# Patient Record
Sex: FEMALE
[De-identification: names, ages, dates, MRNs, and addresses within clinical notes are randomized per-mention and may not be internally consistent; named-entity substitution may affect disease eponyms.]

---

## 2017-02-22 ENCOUNTER — HOSPITAL (OUTPATIENT)
Dept: OTHER | Age: 82
End: 2017-02-22
Attending: INTERNAL MEDICINE

## 2017-02-22 LAB
ANION GAP SERPL CALC-SCNC: 16 MMOL/L (ref 10–20)
BUN SERPL-MCNC: 37 MG/DL (ref 6–20)
BUN/CREAT SERPL: 29 (ref 7–25)
CALCIUM SERPL-MCNC: 9 MG/DL (ref 8.4–10.2)
CHLORIDE: 104 MMOL/L (ref 98–107)
CO2 SERPL-SCNC: 26 MMOL/L (ref 21–32)
CREAT SERPL-MCNC: 1.27 MG/DL (ref 0.51–0.95)
GLUCOSE SERPL-MCNC: 86 MG/DL (ref 65–99)
POTASSIUM SERPL-SCNC: 4.6 MMOL/L (ref 3.4–5.1)
SODIUM SERPL-SCNC: 141 MMOL/L (ref 135–145)
VIT B12 SERPL-MCNC: 758 PG/ML (ref 211–911)

## 2017-03-16 ENCOUNTER — HOSPITAL (OUTPATIENT)
Dept: OTHER | Age: 82
End: 2017-03-16
Attending: INTERNAL MEDICINE

## 2017-03-16 LAB
25(OH)D3+25(OH)D2 SERPL-MCNC: 51.6 NG/ML (ref 30–100)
ANALYZER ANC (IANC): ABNORMAL
ANION GAP SERPL CALC-SCNC: 16 MMOL/L (ref 10–20)
BASOPHILS # BLD: 0 THOUSAND/MCL (ref 0–0.3)
BASOPHILS NFR BLD: 1 %
BUN SERPL-MCNC: 27 MG/DL (ref 6–20)
BUN/CREAT SERPL: 22 (ref 7–25)
CALCIUM SERPL-MCNC: 8.7 MG/DL (ref 8.4–10.2)
CHLORIDE: 107 MMOL/L (ref 98–107)
CO2 SERPL-SCNC: 26 MMOL/L (ref 21–32)
CREAT SERPL-MCNC: 1.25 MG/DL (ref 0.51–0.95)
DIFFERENTIAL METHOD BLD: ABNORMAL
EOSINOPHIL # BLD: 0.1 THOUSAND/MCL (ref 0.1–0.5)
EOSINOPHIL NFR BLD: 3 %
ERYTHROCYTE [DISTWIDTH] IN BLOOD: 13.3 % (ref 11–15)
FREE T3: 1.8 PG/ML (ref 2.2–4)
FREE T4: 1.2 NG/DL (ref 0.8–1.5)
GLUCOSE SERPL-MCNC: 93 MG/DL (ref 65–99)
HEMATOCRIT: 35.4 % (ref 36–46.5)
HGB BLD-MCNC: 11.9 GM/DL (ref 12–15.5)
LYMPHOCYTES # BLD: 1.5 THOUSAND/MCL (ref 1–4)
LYMPHOCYTES NFR BLD: 37 %
MCH RBC QN AUTO: 31.2 PG (ref 26–34)
MCHC RBC AUTO-ENTMCNC: 33.6 GM/DL (ref 32–36.5)
MCV RBC AUTO: 92.7 FL (ref 78–100)
MONOCYTES # BLD: 0.3 THOUSAND/MCL (ref 0.3–0.9)
MONOCYTES NFR BLD: 9 %
NEUTROPHILS # BLD: 2 THOUSAND/MCL (ref 1.8–7.7)
NEUTROPHILS NFR BLD: 50 %
NEUTS SEG NFR BLD: ABNORMAL %
PERCENT NRBC: ABNORMAL
PLATELET # BLD: 221 THOUSAND/MCL (ref 140–450)
POTASSIUM SERPL-SCNC: 4.6 MMOL/L (ref 3.4–5.1)
RBC # BLD: 3.82 MILLION/MCL (ref 4–5.2)
SODIUM SERPL-SCNC: 144 MMOL/L (ref 135–145)
TSH SERPL-ACNC: 1.79 MCUNIT/ML (ref 0.35–5)
WBC # BLD: 4 THOUSAND/MCL (ref 4.2–11)

## 2017-06-16 ENCOUNTER — HOSPITAL (OUTPATIENT)
Dept: OTHER | Age: 82
End: 2017-06-16
Attending: INTERNAL MEDICINE

## 2017-06-17 ENCOUNTER — DIAGNOSTIC TRANS (OUTPATIENT)
Dept: OTHER | Age: 82
End: 2017-06-17

## 2018-09-10 PROBLEM — I10 ESSENTIAL HYPERTENSION, BENIGN: Status: ACTIVE | Noted: 2018-09-10

## 2018-09-10 PROBLEM — E78.5 HYPERLIPIDEMIA, UNSPECIFIED HYPERLIPIDEMIA TYPE: Status: ACTIVE | Noted: 2018-09-10

## 2018-09-10 PROBLEM — H25.89 OTHER AGE-RELATED CATARACT: Status: ACTIVE | Noted: 2018-09-10

## 2018-09-10 PROBLEM — H90.2: Status: ACTIVE | Noted: 2018-09-10

## 2018-09-10 PROBLEM — E03.9 HYPOTHYROIDISM, UNSPECIFIED TYPE: Status: ACTIVE | Noted: 2018-09-10

## 2019-06-20 PROBLEM — H90.2: Status: RESOLVED | Noted: 2018-09-10 | Resolved: 2019-06-20

## 2019-06-20 PROBLEM — H25.89 OTHER AGE-RELATED CATARACT: Status: RESOLVED | Noted: 2018-09-10 | Resolved: 2019-06-20

## 2020-11-05 PROBLEM — N18.32 STAGE 3B CHRONIC KIDNEY DISEASE (HCC): Status: ACTIVE | Noted: 2020-11-05

## 2020-11-05 PROBLEM — E78.5 HYPERLIPIDEMIA, UNSPECIFIED HYPERLIPIDEMIA TYPE: Status: RESOLVED | Noted: 2018-09-10 | Resolved: 2020-11-05

## 2020-11-05 PROBLEM — E78.2 MIXED HYPERLIPIDEMIA: Status: ACTIVE | Noted: 2018-09-10

## 2021-05-20 ENCOUNTER — APPOINTMENT (OUTPATIENT)
Dept: GENERAL RADIOLOGY | Facility: HOSPITAL | Age: 86
End: 2021-05-20
Attending: EMERGENCY MEDICINE
Payer: MEDICARE

## 2021-05-20 ENCOUNTER — HOSPITAL ENCOUNTER (OUTPATIENT)
Facility: HOSPITAL | Age: 86
Setting detail: OBSERVATION
Discharge: HOME | End: 2021-05-21
Attending: EMERGENCY MEDICINE | Admitting: HOSPITALIST
Payer: MEDICARE

## 2021-05-20 DIAGNOSIS — R53.81 PHYSICAL DECONDITIONING: ICD-10-CM

## 2021-05-20 DIAGNOSIS — I48.91 ATRIAL FIBRILLATION WITH RAPID VENTRICULAR RESPONSE (HCC): Primary | ICD-10-CM

## 2021-05-20 DIAGNOSIS — R04.0 EPISTAXIS: ICD-10-CM

## 2021-05-20 PROCEDURE — 93010 ELECTROCARDIOGRAM REPORT: CPT | Performed by: EMERGENCY MEDICINE

## 2021-05-20 PROCEDURE — 84484 ASSAY OF TROPONIN QUANT: CPT | Performed by: EMERGENCY MEDICINE

## 2021-05-20 PROCEDURE — 85025 COMPLETE CBC W/AUTO DIFF WBC: CPT | Performed by: EMERGENCY MEDICINE

## 2021-05-20 PROCEDURE — 80048 BASIC METABOLIC PNL TOTAL CA: CPT | Performed by: EMERGENCY MEDICINE

## 2021-05-20 PROCEDURE — 93005 ELECTROCARDIOGRAM TRACING: CPT

## 2021-05-20 PROCEDURE — 71045 X-RAY EXAM CHEST 1 VIEW: CPT | Performed by: EMERGENCY MEDICINE

## 2021-05-20 PROCEDURE — 96365 THER/PROPH/DIAG IV INF INIT: CPT

## 2021-05-20 PROCEDURE — 96372 THER/PROPH/DIAG INJ SC/IM: CPT

## 2021-05-20 PROCEDURE — 99285 EMERGENCY DEPT VISIT HI MDM: CPT

## 2021-05-20 PROCEDURE — 96366 THER/PROPH/DIAG IV INF ADDON: CPT

## 2021-05-20 RX ORDER — POLYETHYLENE GLYCOL 3350 17 G/17G
17 POWDER, FOR SOLUTION ORAL DAILY PRN
Status: DISCONTINUED | OUTPATIENT
Start: 2021-05-20 | End: 2021-05-21

## 2021-05-20 RX ORDER — DILTIAZEM HYDROCHLORIDE 5 MG/ML
10 INJECTION INTRAVENOUS ONCE
Status: COMPLETED | OUTPATIENT
Start: 2021-05-20 | End: 2021-05-20

## 2021-05-20 RX ORDER — METOCLOPRAMIDE HYDROCHLORIDE 5 MG/ML
5 INJECTION INTRAMUSCULAR; INTRAVENOUS EVERY 8 HOURS PRN
Status: DISCONTINUED | OUTPATIENT
Start: 2021-05-20 | End: 2021-05-21

## 2021-05-20 RX ORDER — ATORVASTATIN CALCIUM 10 MG/1
10 TABLET, FILM COATED ORAL DAILY
Status: DISCONTINUED | OUTPATIENT
Start: 2021-05-21 | End: 2021-05-21

## 2021-05-20 RX ORDER — LEVOTHYROXINE SODIUM 0.07 MG/1
75 TABLET ORAL
Status: DISCONTINUED | OUTPATIENT
Start: 2021-05-21 | End: 2021-05-21

## 2021-05-20 RX ORDER — HEPARIN SODIUM 5000 [USP'U]/ML
5000 INJECTION, SOLUTION INTRAVENOUS; SUBCUTANEOUS EVERY 8 HOURS SCHEDULED
Status: DISCONTINUED | OUTPATIENT
Start: 2021-05-20 | End: 2021-05-21

## 2021-05-20 RX ORDER — ONDANSETRON 2 MG/ML
4 INJECTION INTRAMUSCULAR; INTRAVENOUS EVERY 6 HOURS PRN
Status: DISCONTINUED | OUTPATIENT
Start: 2021-05-20 | End: 2021-05-21

## 2021-05-20 RX ORDER — ACETAMINOPHEN 325 MG/1
650 TABLET ORAL EVERY 6 HOURS PRN
Status: DISCONTINUED | OUTPATIENT
Start: 2021-05-20 | End: 2021-05-21

## 2021-05-20 RX ORDER — SODIUM BICARBONATE 650 MG/1
325 TABLET ORAL 3 TIMES DAILY
Status: DISCONTINUED | OUTPATIENT
Start: 2021-05-20 | End: 2021-05-21

## 2021-05-20 RX ORDER — BISACODYL 10 MG
10 SUPPOSITORY, RECTAL RECTAL
Status: DISCONTINUED | OUTPATIENT
Start: 2021-05-20 | End: 2021-05-21

## 2021-05-20 NOTE — H&P
[facility] Hospitalist H&P     CC: Patient presents with:  Patricia Drummond       PCP: Kali Maradiaga MD      Assessment and Plan     Hayley Dacosta is a 80year old female with PMH sig for CKD stage 3 with baseline around 1.5 and atrophic right kidney, HTN, hy Laterality Date   • PARTIAL EXCISION THYROID,UNILAT          ALL:    Red Dye                 SWELLING     Home Medications:  No outpatient medications have been marked as taking for the 5/20/21 encounter [facility] Encounter).       Soc Hx  Social History

## 2021-05-20 NOTE — ED QUICK NOTES
Orders for admission, patient is aware of plan and ready to go upstairs. Any questions, please call ED LUZMARIA Venegas  at extension [phone].    Type of COVID test sent:Rapid  COVID Suspicion level: Low    Titratable drug(s) infusing: Cardizem  Rate: 5mg/hr    LOC a

## 2021-05-20 NOTE — ED INITIAL ASSESSMENT (HPI)
Pt BIBEMS from [facility] with c/o nosebleed x1.5 hours. Pt denies blood thinner use, LOC/dizziness.

## 2021-05-20 NOTE — ED QUICK NOTES
Pt states she was washing her face and cleaning her nose, when the left nare suddenly began to bleed. States started approx 1430.  had a nasal clamp at home, that she placed on her nose.  does have history of hypertension.  Denies history of AF

## 2021-05-21 ENCOUNTER — APPOINTMENT (OUTPATIENT)
Dept: CV DIAGNOSTICS | Facility: HOSPITAL | Age: 86
End: 2021-05-21
Attending: INTERNAL MEDICINE
Payer: MEDICARE

## 2021-05-21 VITALS
BODY MASS INDEX: 23.54 KG/M2 | WEIGHT: 137.88 LBS | DIASTOLIC BLOOD PRESSURE: 91 MMHG | SYSTOLIC BLOOD PRESSURE: 158 MMHG | HEIGHT: 64 IN | OXYGEN SATURATION: 98 % | HEART RATE: 84 BPM | RESPIRATION RATE: 18 BRPM | TEMPERATURE: 98 F

## 2021-05-21 PROCEDURE — 84443 ASSAY THYROID STIM HORMONE: CPT | Performed by: HOSPITALIST

## 2021-05-21 PROCEDURE — 80061 LIPID PANEL: CPT | Performed by: HOSPITALIST

## 2021-05-21 PROCEDURE — 85025 COMPLETE CBC W/AUTO DIFF WBC: CPT | Performed by: HOSPITALIST

## 2021-05-21 PROCEDURE — 93306 TTE W/DOPPLER COMPLETE: CPT | Performed by: INTERNAL MEDICINE

## 2021-05-21 PROCEDURE — 96372 THER/PROPH/DIAG INJ SC/IM: CPT

## 2021-05-21 PROCEDURE — 97165 OT EVAL LOW COMPLEX 30 MIN: CPT

## 2021-05-21 PROCEDURE — B246ZZZ ULTRASONOGRAPHY OF RIGHT AND LEFT HEART: ICD-10-PCS | Performed by: INTERNAL MEDICINE

## 2021-05-21 PROCEDURE — 80053 COMPREHEN METABOLIC PANEL: CPT | Performed by: HOSPITALIST

## 2021-05-21 PROCEDURE — 97162 PT EVAL MOD COMPLEX 30 MIN: CPT

## 2021-05-21 PROCEDURE — 97530 THERAPEUTIC ACTIVITIES: CPT

## 2021-05-21 RX ORDER — LISINOPRIL 10 MG/1
10 TABLET ORAL DAILY
Status: DISCONTINUED | OUTPATIENT
Start: 2021-05-22 | End: 2021-05-21

## 2021-05-21 RX ORDER — LISINOPRIL 10 MG/1
10 TABLET ORAL DAILY
Qty: 30 TABLET | Refills: 0 | Status: SHIPPED | OUTPATIENT
Start: 2021-05-22 | End: 2021-06-03

## 2021-05-21 NOTE — PHYSICAL THERAPY NOTE
PHYSICAL THERAPY EVALUATION - INPATIENT     Room Number: 302/302-A  Evaluation Date: 5/21/2021  Type of Evaluation: Initial   Physician Order: PT Eval and Treat    Presenting Problem: AF with epistaxis--cardiology following this admission .    [facility] sign for mild instability related to being in bed this admission.        Patient's current functional deficits include chronic back and right LE pain ,  decrease balance , increased fall risk , need  Initially for  CGA with ambulation progressing to SBA level by end based on documentation in the [facility] '6 clicks' Inpatient Basic Mobility Short Form. Research supports that patients with this level of impairment may benefit from home with no services.    Therapy discussed recommendation for initial recommendation for 24h meds  -check TSH     FEN  -IVF prn, lytes, cardiac diet     PPX; SCD, HSQ     Dispo pending course, full code     Outpatient records reviewed confirming patient's medical history and medications.      Further recommendations pending patient's clinical cour limits  · Safety Judgement:  decreased awareness of need for safety  · Awareness of Deficits:  decreased awareness of deficits    RANGE OF MOTION AND STRENGTH ASSESSMENT    ROM and strength not formally assessed , fxn mobility assessment of ROM and strengt Score: 20   Approx Degree of Impairment: 35.83%   Standardized Score (AM-PAC Scale): 47.67   CMS Modifier (G-Code): CJ    FUNCTIONAL ABILITY STATUS  Gait Assessment   Gait Assistance:  (initially CGA progressed to SBA /supervision )  Distance (ft): 240 ft

## 2021-05-21 NOTE — CONSULTS
[facility]    Report of Consultation    Hazel aEston Patient Status:  Observation    1931 MRN [id number]   Location [facility] 3W/SW Attending Bang Harris MD   Hosp Day # 0 PCP Diane Chavez MD     Date of Adm suppository 10 mg, 10 mg, Rectal, Daily PRN  atorvastatin (LIPITOR) tab 10 mg, 10 mg, Oral, Daily  Levothyroxine Sodium tab 75 mcg, 75 mcg, Oral, Before breakfast  sodium bicarbonate tab 325 mg, 325 mg, Oral, TID  dilTIAZem BOLUS FROM BAG 10 mg infusion, 1 ALT 15 05/21/2021    T4F 1.52 09/10/2018    TSH 1.650 05/21/2021    PHOS 4.10 03/03/2021    TROP <0.045 05/20/2021    B12 893 11/01/2019         Imaging:  XR CHEST AP PORTABLE  (CPT=71045)    Result Date: 5/20/2021  CONCLUSION:  Negative for radiographi

## 2021-05-21 NOTE — PLAN OF CARE
Crystal Jarquin is alert and orientated. Admission complete. Orientated to unit. Cardizem drip stopped per protocol. Dr. Neto Atkins notified -- per Dr. Neto Atkins, do not administer oral Cardizem at this time. Call light within reach.     Problem: Patient Centered Care  Goal: CARDIOVASCULAR - ADULT  Goal: Maintains optimal cardiac output and hemodynamic stability  Description: INTERVENTIONS:  - Monitor vital signs, rhythm, and trends  - Monitor for bleeding, hypotension and signs of decreased cardiac output  - Evaluate effectiv

## 2021-05-21 NOTE — DIETARY NOTE
Brief Nutrition Note:     RN asked RD to see patient for diet education prior to discharge. Pt with questions regarding high K+ rich foods to avoid and general low sodium diet questions. RD answered questions and provided handout.   This very pleasant pat

## 2021-05-21 NOTE — DISCHARGE SUMMARY
[facility] Internal Medicine Discharge Summary   Patient ID:  Chad Sharma  [id number]  36 year old  11/29/1931    Admit date: 5/20/2021    Discharge date and time: 5/21/21    Attending Physician: Micheline Cunningham MD     Primary Care Physician: Julia Ovalle found to be in afib with RVR.     Nose bleed cauterized in ED and now resolved. HR improved on dilt gtt     She denies palpitations or CP. No N/V/D. No fevers. Couhg. She states she is still distressed by her sisters death 1 year ago.     Hospital Cours ventricle: The cavity size was normal. Wall thickness was    increased in a pattern of mild LVH. Systolic function was    normal. The estimated ejection fraction was 60-65%, by biplane    method of disks.  Wall motion was normal; there were no regional    w size. Mitral valve:   Normal thickened leaflets. Mobility was not restricted. Doppler: There was no evidence for stenosis. Mild regurgitation. Left atrium:  The atrium was moderately dilated. Right ventricle: The cavity size was dilated.  Systolic fu LV E/e', lateral                            10.9         ---------  LV e', medial                               4.79  cm/sec ---------  LV E/e', medial                             9.4          ---------  LV e', average                              4.5   cm Value        Reference  Estimated CVP                               3     mm Hg  ---------   Right ventricle                             Value        Reference  RV ID, ED, PLAX                             3.9   cm     ---------  RV pressure, S, DP questions and state understand and agree with therapeutic plan as outlined

## 2021-05-21 NOTE — DISCHARGE PLANNING
I called the pharmacy below to inquire about the cost of Eliquis before patient discharges.      Jacki Pulido 10    The patient's copay is $481.40 for 30 day supply; pharmacist estimates that the patient's copay nakita

## 2021-05-21 NOTE — CONSULTS
Emily Pérez is a 80year old female who I assessed as follows:  Patient presents with:  Nose Bleed         REASON FOR CONSULTATION:    Atrial fib            ASSESSMENT/PLAN:     IMPRESSIONS:  1. PAF, new diagnosis, as Intravenous, Q6H PRN  Metoclopramide HCl (REGLAN) injection 5 mg, 5 mg, Intravenous, Q8H PRN  PEG 3350 (MIRALAX) powder packet 17 g, 17 g, Oral, Daily PRN  bisacodyl (DULCOLAX) rectal suppository 10 mg, 10 mg, Rectal, Daily PRN  atorvastatin (LIPITOR) tab edema  NEUROLOGY: alert  PSYCH: cooperative          LABORATORY DATA:               ECG: AF        ECHO:          Labs:  Recent Labs   Lab 05/20/21  1622 05/21/21  0526   * 95   BUN 40* 42*   CREATSERUM 1.60* 1.56*   GFRAA 33* 34*   GFRNAA 28* 29*

## 2021-05-21 NOTE — CM/SW NOTE
KRISTOPHER met with the pt. At bedside. The pt. Lives alone in an independent apartment at [facility]. The pt. Reports being independent prior to admission with adls, ambulation and driving. The pt. Does not have any children or family in the area. The pt.  Is

## 2021-05-21 NOTE — PLAN OF CARE
Patient converted from atrial fibrillation to sinus rhythm at 1927; heart rate range is currently 70's-80's.

## 2021-05-21 NOTE — ED PROVIDER NOTES
Patient Seen in: [facility] CL[facility] 3w/sw      History   Patient presents with:  Nose Bleed    Stated Complaint: nosebleed    HPI/Subjective:   HPI    The patient is an 80-year-old female with history of hypertension who presents to the emergency departme Pharynx: Oropharynx is clear. Eyes:      Conjunctiva/sclera: Conjunctivae normal.      Pupils: Pupils are equal, round, and reactive to light. Neck:      Vascular: No JVD. Cardiovascular:      Rate and Rhythm: Tachycardia present. Rhythm irregular. WITH DIFFERENTIAL WITH PLATELET.   Procedure                               Abnormality         Status                     ---------                               -----------         ------                     CBC W/ DIFFERENTIAL[[id number]]          Abnormal response ([facility].)  (primary encounter diagnosis)  Epistaxis     Disposition:  Admit  5/20/2021  5:35 pm    Follow-up:  No follow-up provider specified.   We recommend that you schedule follow up care with a primary care provider within the next three months to

## 2021-05-21 NOTE — OCCUPATIONAL THERAPY NOTE
OCCUPATIONAL THERAPY EVALUATION - INPATIENT     Room Number: 302/302-A  Evaluation Date: 5/21/2021  Type of Evaluation: Quick Eval  Presenting Problem: Afib with RVR, epistaxis    Physician Order: IP Consult to Occupational Therapy  Reason for Therapy: A Impairment: 32.79% has been calculated based on documentation in the [facility] '6 clicks' Inpatient Daily Activity Short Form. Research supports that patients with this level of impairment may benefit from returning home without services.  Pt would benefit fro OBJECTIVE  Precautions: Cardiac;Hard of hearing  Fall Risk: Standard fall risk    PAIN ASSESSMENT  Ratin    ACTIVITY TOLERANCE  BP: 156/86  BP Location: Right arm  BP Method: Automatic  Patient Position: Sitting    O2 SATURATIONS  Oxygen Therapy  S achieve the following . ..    Patient able to toilet transfer  CGA (adequate for dc)    Patient able to dress lower extremities  CGA (adequate for dc)    Patient/Caregiver able to demonstrate safety with ADLS  CGA (adequate for dc)   DERIAN Lawson

## 2021-06-07 PROBLEM — Z79.01 LONG TERM CURRENT USE OF ANTICOAGULANT THERAPY: Status: ACTIVE | Noted: 2021-06-07

## 2021-06-22 ENCOUNTER — OFFICE VISIT (OUTPATIENT)
Dept: OTOLARYNGOLOGY | Facility: CLINIC | Age: 86
End: 2021-06-22
Payer: MEDICARE

## 2021-06-22 VITALS
WEIGHT: 143 LBS | HEIGHT: 60 IN | DIASTOLIC BLOOD PRESSURE: 91 MMHG | SYSTOLIC BLOOD PRESSURE: 149 MMHG | HEART RATE: 85 BPM | BODY MASS INDEX: 28.07 KG/M2

## 2021-06-22 DIAGNOSIS — R04.0 EPISTAXIS: Primary | ICD-10-CM

## 2021-06-22 PROCEDURE — 99203 OFFICE O/P NEW LOW 30 MIN: CPT | Performed by: OTOLARYNGOLOGY

## 2021-06-22 PROCEDURE — 30901 CONTROL OF NOSEBLEED: CPT | Performed by: OTOLARYNGOLOGY

## 2021-06-22 NOTE — PROGRESS NOTES
Hayley Dacosta is a 80year old female.   Patient presents with:  Nose Bleed: recurrent nose bleeds, pt seen at [address] [place] [address] ER 5/20 , pt addmitted for irregular HR,, pt had nose cauterized by anaother ENT on 06/04 right side , pt currently on Eliquis       HISTOR     Days of Exercise per Week:       Minutes of Exercise per Session:   Stress:       Feeling of Stress :   Social Connections:       Frequency of Communication with Friends and Family:       Frequency of Social Gatherings with Friends and Family:       Normal.  . EOMI   Neurological Normal Memory - Normal. Cranial nerves - Cranial nerves II through XII grossly intact.  Gait is normal   Head/Face Normal Facial features - Normal. Eyebrows - Normal. Skull - Normal.             Ears Normal Inspection - Right: - CTRL NOSEBLEED,ANTER,SIMPLE    I discussed the pathophysiology of epistaxis at length with the patient. Potential causes include :high blood pressure, inflammatory medications, blood thinners, nose picking, and dry conditions.  I emphasized the need for

## 2021-07-01 ENCOUNTER — TELEPHONE (OUTPATIENT)
Dept: AUDIOLOGY | Facility: CLINIC | Age: 86
End: 2021-07-01

## 2021-07-01 NOTE — TELEPHONE ENCOUNTER
Per pt requesting to speak to audiologist prior to making HT, states she has hearing aid questions. Please call thank you.

## 2021-07-01 NOTE — TELEPHONE ENCOUNTER
Spoke with patient and answered all of her questions regarding hearing aids. She will call back and make an appt if she wishes to proceed.     Alejandro Palencia AUD

## 2021-07-12 ENCOUNTER — OFFICE VISIT (OUTPATIENT)
Dept: OTOLARYNGOLOGY | Facility: CLINIC | Age: 86
End: 2021-07-12
Payer: MEDICARE

## 2021-07-12 VITALS
HEIGHT: 60 IN | BODY MASS INDEX: 27.48 KG/M2 | TEMPERATURE: 98 F | SYSTOLIC BLOOD PRESSURE: 152 MMHG | WEIGHT: 140 LBS | DIASTOLIC BLOOD PRESSURE: 88 MMHG

## 2021-07-12 DIAGNOSIS — R04.0 EPISTAXIS: Primary | ICD-10-CM

## 2021-07-12 PROCEDURE — 30901 CONTROL OF NOSEBLEED: CPT | Performed by: OTOLARYNGOLOGY

## 2021-07-12 PROCEDURE — 99213 OFFICE O/P EST LOW 20 MIN: CPT | Performed by: OTOLARYNGOLOGY

## 2021-07-12 NOTE — PROGRESS NOTES
Kylee Whiting is a 80year old female.   Patient presents with:  Nose Bleed: Patient has left nostril nosebleed last episode was Saturday taking blood thinner      HISTORY OF PRESENT ILLNESS  6/22/2021  Patient prevents for evaluation of nosebleeds  Rece Out of Food in the Last Year:   Transportation Needs:       Lack of Transportation (Medical):       Lack of Transportation (Non-Medical):   Physical Activity:       Days of Exercise per Week:       Minutes of Exercise per Session:   Stress:       Feeling o - Normal. Palpation - Normal without lymphadenopathy. Parotid gland - Normal. Thyroid gland - Normal.   Eyes Normal Conjunctiva - Right: Normal, Left: Normal. Pupil - Right: Normal, Left: Normal.  .  EOMI   Neurological Normal Memory - Normal. Cranial nerve taking: Reported on 6/22/2021 ), Disp: , Rfl:   •  Neomycin-Polymyxin-HC 3.5-45799-0 Otic Suspension, 2 drops either nostril BID PRN (Patient not taking: Reported on 7/12/2021 ), Disp: 1 each, Rfl: 2  ASSESSMENT AND PLAN    1. Epistaxis     - CTRL Twila Gallegos

## 2021-07-13 ENCOUNTER — TELEPHONE (OUTPATIENT)
Dept: OTOLARYNGOLOGY | Facility: CLINIC | Age: 86
End: 2021-07-13

## 2021-07-14 ENCOUNTER — OFFICE VISIT (OUTPATIENT)
Dept: OTOLARYNGOLOGY | Facility: CLINIC | Age: 86
End: 2021-07-14
Payer: MEDICARE

## 2021-07-14 VITALS
DIASTOLIC BLOOD PRESSURE: 87 MMHG | BODY MASS INDEX: 27.48 KG/M2 | HEIGHT: 60 IN | WEIGHT: 140 LBS | TEMPERATURE: 97 F | SYSTOLIC BLOOD PRESSURE: 164 MMHG

## 2021-07-14 DIAGNOSIS — R04.0 EPISTAXIS: Primary | ICD-10-CM

## 2021-07-14 PROCEDURE — 30901 CONTROL OF NOSEBLEED: CPT | Performed by: OTOLARYNGOLOGY

## 2021-07-14 PROCEDURE — 99213 OFFICE O/P EST LOW 20 MIN: CPT | Performed by: OTOLARYNGOLOGY

## 2021-07-14 NOTE — PROGRESS NOTES
Regan Mueller is a 80year old female. No chief complaint on file.       HISTORY OF PRESENT ILLNESS  6/22/2021  Patient prevents for evaluation of nosebleeds  Recent bleeding last  Started  2 days ago Patient is currenly treated with pinching her nostri Year:       Ran Out of Food in the Last Year:   Transportation Needs:       Lack of Transportation (Medical):       Lack of Transportation (Non-Medical):   Physical Activity:       Days of Exercise per Week:       Minutes of Exercise per Session:   Stress: without lymphadenopathy. Parotid gland - Normal. Thyroid gland - Normal.   Eyes Normal Conjunctiva - Right: Normal, Left: Normal. Pupil - Right: Normal, Left: Normal.  .  EOMI   Neurological Normal Memory - Normal. Cranial nerves - Cranial nerves II through mouth nightly.  ), Disp: 90 tablet, Rfl: 3  •  Cholecalciferol (VITAMIN D) 50 MCG (2000 UT) Oral Cap, Take by mouth., Disp: , Rfl:   ASSESSMENT AND PLAN    1. Epistaxis     - CTRL NOSEBLEED,ANTER,SIMPLE    I discussed the pathophysiology of epistaxis at le

## 2021-07-19 ENCOUNTER — OFFICE VISIT (OUTPATIENT)
Dept: OTOLARYNGOLOGY | Facility: CLINIC | Age: 86
End: 2021-07-19
Payer: MEDICARE

## 2021-07-19 VITALS
HEIGHT: 60 IN | SYSTOLIC BLOOD PRESSURE: 147 MMHG | TEMPERATURE: 97 F | WEIGHT: 140 LBS | BODY MASS INDEX: 27.48 KG/M2 | DIASTOLIC BLOOD PRESSURE: 80 MMHG

## 2021-07-19 DIAGNOSIS — R04.0 EPISTAXIS: Primary | ICD-10-CM

## 2021-07-19 PROCEDURE — 99213 OFFICE O/P EST LOW 20 MIN: CPT | Performed by: OTOLARYNGOLOGY

## 2021-07-19 NOTE — PROGRESS NOTES
Radha Maloney is a 80year old female. Patient presents with: Follow - Up: pt presents today for a f/u on nosebleeds from 07/14. removal of packing today.       HISTORY OF PRESENT ILLNESS  6/22/2021  Patient prevents for evaluation of nosebleeds  Recen of Paying Living Expenses:   Food Insecurity:       Worried About [facility] in the Last Year:       Ran Out of Food in the Last Year:   Transportation Needs:       Lack of Transportation (Medical):       Lack of Transportation (Non-Medical):    Ph Orientation - Oriented to time, place, person & situation. Appropriate mood and affect. Neck Exam Normal Inspection - Normal. Palpation - Normal without lymphadenopathy.  Parotid gland - Normal. Thyroid gland - Normal.   Eyes Normal Conjunctiva - Right: N Rfl: 2  ASSESSMENT AND PLAN    1. Epistaxis  Pack was removed and I think she is can be fine mucosa is very well-healed with no evidence of any crusting dryness or prominent blood vessels she is on Eliquis which puts her at a higher risk I did discuss this

## 2021-08-27 ENCOUNTER — APPOINTMENT (OUTPATIENT)
Dept: CT IMAGING | Facility: HOSPITAL | Age: 86
DRG: 375 | End: 2021-08-27
Attending: EMERGENCY MEDICINE
Payer: MEDICARE

## 2021-08-27 ENCOUNTER — HOSPITAL ENCOUNTER (INPATIENT)
Facility: HOSPITAL | Age: 86
LOS: 7 days | Discharge: HOME | DRG: 375 | End: 2021-09-03
Attending: EMERGENCY MEDICINE | Admitting: HOSPITALIST
Payer: MEDICARE

## 2021-08-27 ENCOUNTER — APPOINTMENT (OUTPATIENT)
Dept: GENERAL RADIOLOGY | Facility: HOSPITAL | Age: 86
DRG: 375 | End: 2021-08-27
Attending: EMERGENCY MEDICINE
Payer: MEDICARE

## 2021-08-27 DIAGNOSIS — C78.6 CARCINOMATOSIS PERITONEI (HCC): ICD-10-CM

## 2021-08-27 DIAGNOSIS — N17.9 ACUTE KIDNEY INJURY (HCC): Primary | ICD-10-CM

## 2021-08-27 LAB
ALBUMIN SERPL-MCNC: 2.9 G/DL (ref 3.4–5)
ALP LIVER SERPL-CCNC: 60 U/L
ALT SERPL-CCNC: 14 U/L
ANION GAP SERPL CALC-SCNC: 9 MMOL/L (ref 0–18)
AST SERPL-CCNC: 29 U/L (ref 15–37)
BASOPHILS # BLD AUTO: 0.03 X10(3) UL (ref 0–0.2)
BASOPHILS NFR BLD AUTO: 0.6 %
BILIRUB DIRECT SERPL-MCNC: 0.1 MG/DL (ref 0–0.2)
BILIRUB SERPL-MCNC: 0.3 MG/DL (ref 0.1–2)
BUN BLD-MCNC: 63 MG/DL (ref 7–18)
BUN/CREAT SERPL: 23.9 (ref 10–20)
CALCIUM BLD-MCNC: 8.8 MG/DL (ref 8.5–10.1)
CEA SERPL-MCNC: 1 NG/ML (ref ?–5)
CHLORIDE SERPL-SCNC: 103 MMOL/L (ref 98–112)
CO2 SERPL-SCNC: 20 MMOL/L (ref 21–32)
CREAT BLD-MCNC: 2.64 MG/DL
DEPRECATED RDW RBC AUTO: 43.3 FL (ref 35.1–46.3)
EOSINOPHIL # BLD AUTO: 0.08 X10(3) UL (ref 0–0.7)
EOSINOPHIL NFR BLD AUTO: 1.6 %
ERYTHROCYTE [DISTWIDTH] IN BLOOD BY AUTOMATED COUNT: 13.2 % (ref 11–15)
GLUCOSE BLD-MCNC: 99 MG/DL (ref 70–99)
HCT VFR BLD AUTO: 31.6 %
HGB BLD-MCNC: 10.6 G/DL
IMM GRANULOCYTES # BLD AUTO: 0.03 X10(3) UL (ref 0–1)
IMM GRANULOCYTES NFR BLD: 0.6 %
LYMPHOCYTES # BLD AUTO: 0.58 X10(3) UL (ref 1–4)
LYMPHOCYTES NFR BLD AUTO: 11.8 %
M PROTEIN MFR SERPL ELPH: 8 G/DL (ref 6.4–8.2)
MCH RBC QN AUTO: 29.9 PG (ref 26–34)
MCHC RBC AUTO-ENTMCNC: 33.5 G/DL (ref 31–37)
MCV RBC AUTO: 89.3 FL
MONOCYTES # BLD AUTO: 0.58 X10(3) UL (ref 0.1–1)
MONOCYTES NFR BLD AUTO: 11.8 %
NEUTROPHILS # BLD AUTO: 3.62 X10 (3) UL (ref 1.5–7.7)
NEUTROPHILS # BLD AUTO: 3.62 X10(3) UL (ref 1.5–7.7)
NEUTROPHILS NFR BLD AUTO: 73.6 %
NT-PROBNP SERPL-MCNC: 677 PG/ML (ref ?–450)
OSMOLALITY SERPL CALC.SUM OF ELEC: 292 MOSM/KG (ref 275–295)
PLATELET # BLD AUTO: 414 10(3)UL (ref 150–450)
POTASSIUM SERPL-SCNC: 3.9 MMOL/L (ref 3.5–5.1)
RBC # BLD AUTO: 3.54 X10(6)UL
SARS-COV-2 RNA RESP QL NAA+PROBE: NOT DETECTED
SODIUM SERPL-SCNC: 132 MMOL/L (ref 136–145)
WBC # BLD AUTO: 4.9 X10(3) UL (ref 4–11)

## 2021-08-27 PROCEDURE — 99285 EMERGENCY DEPT VISIT HI MDM: CPT

## 2021-08-27 PROCEDURE — 80048 BASIC METABOLIC PNL TOTAL CA: CPT | Performed by: EMERGENCY MEDICINE

## 2021-08-27 PROCEDURE — 80076 HEPATIC FUNCTION PANEL: CPT | Performed by: EMERGENCY MEDICINE

## 2021-08-27 PROCEDURE — 71045 X-RAY EXAM CHEST 1 VIEW: CPT | Performed by: EMERGENCY MEDICINE

## 2021-08-27 PROCEDURE — 93010 ELECTROCARDIOGRAM REPORT: CPT | Performed by: EMERGENCY MEDICINE

## 2021-08-27 PROCEDURE — 74176 CT ABD & PELVIS W/O CONTRAST: CPT | Performed by: EMERGENCY MEDICINE

## 2021-08-27 PROCEDURE — 82378 CARCINOEMBRYONIC ANTIGEN: CPT | Performed by: INTERNAL MEDICINE

## 2021-08-27 PROCEDURE — 85025 COMPLETE CBC W/AUTO DIFF WBC: CPT | Performed by: EMERGENCY MEDICINE

## 2021-08-27 PROCEDURE — 36415 COLL VENOUS BLD VENIPUNCTURE: CPT

## 2021-08-27 PROCEDURE — 82105 ALPHA-FETOPROTEIN SERUM: CPT | Performed by: INTERNAL MEDICINE

## 2021-08-27 PROCEDURE — 0W9G3ZZ DRAINAGE OF PERITONEAL CAVITY, PERCUTANEOUS APPROACH: ICD-10-PCS | Performed by: RADIOLOGY

## 2021-08-27 PROCEDURE — 83880 ASSAY OF NATRIURETIC PEPTIDE: CPT | Performed by: EMERGENCY MEDICINE

## 2021-08-27 PROCEDURE — 93005 ELECTROCARDIOGRAM TRACING: CPT

## 2021-08-27 RX ORDER — ATORVASTATIN CALCIUM 10 MG/1
10 TABLET, FILM COATED ORAL DAILY
Status: DISCONTINUED | OUTPATIENT
Start: 2021-08-27 | End: 2021-09-03

## 2021-08-27 RX ORDER — LEVOTHYROXINE SODIUM 0.05 MG/1
75 TABLET ORAL
Status: DISCONTINUED | OUTPATIENT
Start: 2021-08-28 | End: 2021-09-03

## 2021-08-27 RX ORDER — BISACODYL 10 MG
10 SUPPOSITORY, RECTAL RECTAL
Status: DISCONTINUED | OUTPATIENT
Start: 2021-08-27 | End: 2021-09-03

## 2021-08-27 RX ORDER — ONDANSETRON 2 MG/ML
4 INJECTION INTRAMUSCULAR; INTRAVENOUS EVERY 6 HOURS PRN
Status: DISCONTINUED | OUTPATIENT
Start: 2021-08-27 | End: 2021-09-03

## 2021-08-27 RX ORDER — POLYETHYLENE GLYCOL 3350 17 G/17G
17 POWDER, FOR SOLUTION ORAL DAILY PRN
Status: DISCONTINUED | OUTPATIENT
Start: 2021-08-27 | End: 2021-09-03

## 2021-08-27 RX ORDER — TRAMADOL HYDROCHLORIDE 50 MG/1
50 TABLET ORAL EVERY 12 HOURS PRN
Status: DISCONTINUED | OUTPATIENT
Start: 2021-08-27 | End: 2021-09-03

## 2021-08-27 RX ORDER — METOCLOPRAMIDE HYDROCHLORIDE 5 MG/ML
5 INJECTION INTRAMUSCULAR; INTRAVENOUS EVERY 8 HOURS PRN
Status: DISCONTINUED | OUTPATIENT
Start: 2021-08-27 | End: 2021-09-03

## 2021-08-27 RX ORDER — ACETAMINOPHEN 325 MG/1
650 TABLET ORAL EVERY 6 HOURS PRN
Status: DISCONTINUED | OUTPATIENT
Start: 2021-08-27 | End: 2021-09-03

## 2021-08-27 RX ORDER — NEOMYCIN SULFATE, POLYMYXIN B SULFATE AND HYDROCORTISONE 10; 3.5; 1 MG/ML; MG/ML; [USP'U]/ML
2 SUSPENSION/ DROPS AURICULAR (OTIC) 2 TIMES DAILY PRN
Status: DISCONTINUED | OUTPATIENT
Start: 2021-08-27 | End: 2021-08-27

## 2021-08-27 NOTE — ED QUICK NOTES
Orders for admission, patient is aware of plan and ready to go upstairs. Any questions, please call ED RN Shruti Angry  at extension [phone].      Type of COVID test sent: rapid - pending  COVID Suspicion level: low, vaccinated  Drug(s) infusing: n/a  LOC at time

## 2021-08-27 NOTE — ED INITIAL ASSESSMENT (HPI)
Patient to ed via private vehicle patient sent to ed for further eval. Patient reported has been sob with abd pain/bloating x may 20,2020. Patient stated was due to her heart and kidney. Hx of nephrectomy.

## 2021-08-27 NOTE — H&P
[facility] Hospitalist H&P       CC: Patient presents with:  Difficulty Breathing       PCP: Chasity Doherty MD    History of Present Illness: Patient is a 80year old female with PMH sig for A-fib, CKD stage 3 with baseline Cr around 1.5 and atrophic right k Regular rate and rhythm, S1, S2 normal   Abdomen:   Soft, tender at the midline area. Bowel sounds normal. mild distended   Extremities: Extremities normal, atraumatic, no cyanosis or edema. Skin: Skin color, texture, turgor normal. No rashes or lesions. / PLAN:   Patient is a 80year old female with PMH sig for A-fib, CKD stage 3 with baseline Cr around 1.5 and atrophic right kidney, HTN, Hypothyroidism who presented with SOB and abdominal distention.     SOB and abdominal distension   - CT abd with perito

## 2021-08-27 NOTE — ED PROVIDER NOTES
No bloating  Patient Seen in: [facility] AND [facility] Emergency Department      History   Patient presents with:  Difficulty Breathing    Stated Complaint: DUARTE    HPI/Subjective:   HPI    Patient is an 28-year-old female who presents from home with 1 month of pr with no palpable masses  Extremities: FROM of all extremities, no cyanosis/clubbing/edema  Neuro: CN intact, normal speech, normal gait, 5/5 motor strength in all extremities, no focal deficits  SKIN: warm, dry, no rashes        ED Course     Labs Reviewed findings: CT ABDOMEN+PELVIS(CPT=74176)    Result Date: 8/27/2021  CONCLUSION:  1. Findings highly suspicious for peritoneal carcinomatosis including moderate ascites and omental caking.  2. The liver is lobulated in contour, which is suspicious for cirrhoti paracentesis and nephrology consultations. D/w Luis F Gaytan, will see in consultation. D/w dr King Dove, will see in consultation.      Admission disposition: 8/27/2021  4:44 PM                                  Disposition and Plan     Clinical Impression:  Acu

## 2021-08-27 NOTE — CONSULTS
NEPHROLOGY CONSULT NOTE     DATE: 8/27/2021    Requesting Physician: Dr. Wilian Lenz      Reason for Consult: ED consult      HISTORY OF PRESENT ILLNESS: Vilma Paniagua is an 80year old female with history of  HTN, HL, hypothyroidism and CKD stage 3 in th section. Never . Yazidi. Originally from [address] .    Social Determinants of Health  Financial Resource Strain:       Difficulty of Paying Living Expenses:   Food Insecurity:       Worried About Running Out of Food in the [place] INTAKE/OUTPUT:  No intake or output data in the 24 hours ending 08/27/21 170        IMAGING:  CT ABDOMEN+PELVIS(CPT=74176)    Result Date: 8/27/2021  CONCLUSION:  1.  Findings highly suspicious for peritoneal carcinomatosis including moderate ascit Abd distension / ascites   - CT abd concerning for peritoneal carcinomatosis with moderate ascites and omental caking.  - paracentesis planned   - per primary      Anemia  - Hb at goal, monitor      Dw Dr. Jesus Allen      Thank you for the consult and allo

## 2021-08-28 LAB
AFP-TM SERPL-MCNC: 11.1 NG/ML (ref ?–8)
ALBUMIN SERPL-MCNC: 2.8 G/DL (ref 3.4–5)
ALBUMIN/GLOB SERPL: 0.6 {RATIO} (ref 1–2)
ALP LIVER SERPL-CCNC: 55 U/L
ALT SERPL-CCNC: 12 U/L
ANION GAP SERPL CALC-SCNC: 11 MMOL/L (ref 0–18)
AST SERPL-CCNC: 24 U/L (ref 15–37)
BASOPHILS # BLD AUTO: 0.03 X10(3) UL (ref 0–0.2)
BASOPHILS NFR BLD AUTO: 0.7 %
BILIRUB SERPL-MCNC: 0.3 MG/DL (ref 0.1–2)
BILIRUB UR QL: NEGATIVE
BUN BLD-MCNC: 61 MG/DL (ref 7–18)
BUN/CREAT SERPL: 24.5 (ref 10–20)
CALCIUM BLD-MCNC: 8.5 MG/DL (ref 8.5–10.1)
CHLORIDE SERPL-SCNC: 106 MMOL/L (ref 98–112)
CO2 SERPL-SCNC: 18 MMOL/L (ref 21–32)
COLOR UR: YELLOW
CREAT BLD-MCNC: 2.49 MG/DL
CREAT UR-SCNC: 68.6 MG/DL
DEPRECATED RDW RBC AUTO: 43.4 FL (ref 35.1–46.3)
EOSINOPHIL # BLD AUTO: 0.08 X10(3) UL (ref 0–0.7)
EOSINOPHIL NFR BLD AUTO: 1.8 %
ERYTHROCYTE [DISTWIDTH] IN BLOOD BY AUTOMATED COUNT: 13.2 % (ref 11–15)
GLOBULIN PLAS-MCNC: 4.5 G/DL (ref 2.8–4.4)
GLUCOSE BLD-MCNC: 84 MG/DL (ref 70–99)
GLUCOSE UR-MCNC: NEGATIVE MG/DL
HAV IGM SER QL: 1.7 MG/DL (ref 1.6–2.6)
HCT VFR BLD AUTO: 32.2 %
HGB BLD-MCNC: 10.6 G/DL
IMM GRANULOCYTES # BLD AUTO: 0.03 X10(3) UL (ref 0–1)
IMM GRANULOCYTES NFR BLD: 0.7 %
KETONES UR-MCNC: NEGATIVE MG/DL
LYMPHOCYTES # BLD AUTO: 0.52 X10(3) UL (ref 1–4)
LYMPHOCYTES NFR BLD AUTO: 11.7 %
M PROTEIN MFR SERPL ELPH: 7.3 G/DL (ref 6.4–8.2)
MCH RBC QN AUTO: 29.4 PG (ref 26–34)
MCHC RBC AUTO-ENTMCNC: 32.9 G/DL (ref 31–37)
MCV RBC AUTO: 89.2 FL
MONOCYTES # BLD AUTO: 0.45 X10(3) UL (ref 0.1–1)
MONOCYTES NFR BLD AUTO: 10.1 %
NEUTROPHILS # BLD AUTO: 3.33 X10 (3) UL (ref 1.5–7.7)
NEUTROPHILS # BLD AUTO: 3.33 X10(3) UL (ref 1.5–7.7)
NEUTROPHILS NFR BLD AUTO: 75 %
NITRITE UR QL STRIP.AUTO: NEGATIVE
OSMOLALITY SERPL CALC.SUM OF ELEC: 296 MOSM/KG (ref 275–295)
OSMOLALITY UR: 317 MOSM/KG (ref 300–1100)
PH UR: 5 [PH] (ref 5–8)
PHOSPHATE SERPL-MCNC: 5 MG/DL (ref 2.5–4.9)
PLATELET # BLD AUTO: 400 10(3)UL (ref 150–450)
POTASSIUM SERPL-SCNC: 4 MMOL/L (ref 3.5–5.1)
PROT UR-MCNC: 30 MG/DL
RBC # BLD AUTO: 3.61 X10(6)UL
SODIUM SERPL-SCNC: 135 MMOL/L (ref 136–145)
SODIUM SERPL-SCNC: 37 MMOL/L
SP GR UR STRIP: 1.01 (ref 1–1.03)
UROBILINOGEN UR STRIP-ACNC: <2
UUN UR-MCNC: 423 MG/DL
WBC # BLD AUTO: 4.4 X10(3) UL (ref 4–11)

## 2021-08-28 PROCEDURE — 83735 ASSAY OF MAGNESIUM: CPT | Performed by: INTERNAL MEDICINE

## 2021-08-28 PROCEDURE — 84100 ASSAY OF PHOSPHORUS: CPT | Performed by: INTERNAL MEDICINE

## 2021-08-28 PROCEDURE — C9113 INJ PANTOPRAZOLE SODIUM, VIA: HCPCS | Performed by: INTERNAL MEDICINE

## 2021-08-28 PROCEDURE — 82570 ASSAY OF URINE CREATININE: CPT | Performed by: INTERNAL MEDICINE

## 2021-08-28 PROCEDURE — 80053 COMPREHEN METABOLIC PANEL: CPT | Performed by: INTERNAL MEDICINE

## 2021-08-28 PROCEDURE — 87086 URINE CULTURE/COLONY COUNT: CPT | Performed by: EMERGENCY MEDICINE

## 2021-08-28 PROCEDURE — 84540 ASSAY OF URINE/UREA-N: CPT | Performed by: INTERNAL MEDICINE

## 2021-08-28 PROCEDURE — 85025 COMPLETE CBC W/AUTO DIFF WBC: CPT | Performed by: INTERNAL MEDICINE

## 2021-08-28 PROCEDURE — 83935 ASSAY OF URINE OSMOLALITY: CPT | Performed by: INTERNAL MEDICINE

## 2021-08-28 PROCEDURE — 81001 URINALYSIS AUTO W/SCOPE: CPT | Performed by: EMERGENCY MEDICINE

## 2021-08-28 PROCEDURE — 84300 ASSAY OF URINE SODIUM: CPT | Performed by: INTERNAL MEDICINE

## 2021-08-28 RX ORDER — SODIUM CHLORIDE, SODIUM LACTATE, POTASSIUM CHLORIDE, CALCIUM CHLORIDE 600; 310; 30; 20 MG/100ML; MG/100ML; MG/100ML; MG/100ML
INJECTION, SOLUTION INTRAVENOUS CONTINUOUS
Status: DISCONTINUED | OUTPATIENT
Start: 2021-08-28 | End: 2021-08-29

## 2021-08-28 RX ORDER — MAGNESIUM OXIDE 400 MG (241.3 MG MAGNESIUM) TABLET
400 TABLET ONCE
Status: COMPLETED | OUTPATIENT
Start: 2021-08-28 | End: 2021-08-28

## 2021-08-28 NOTE — PLAN OF CARE
Problem: Patient Centered Care  Goal: Patient preferences are identified and integrated in the patient's plan of care  Description: Interventions:  - What would you like us to know as we care for you?  I live at [facility] timely, complete, and suctioning and perform as needed  - Assess and instruct to report SOB or any respiratory difficulty  - Respiratory Therapy support as indicated  - Manage/alleviate anxiety  - Monitor for signs/symptoms of CO2 retention  Outcome: Progressing     Problem: GA pain and pain management  - Manage/alleviate anxiety  - Utilize distraction and/or relaxation techniques  - Monitor for opioid side effects  - Notify MD/LIP if interventions unsuccessful or patient reports new pain  - Anticipate increased pain with activit Tolerates diet. Vital signs taken and stable. Abdomen is round and distended. Parisi catheter in place and draining clear and yellow urine. Intake and output is monitored. Magnesium level this morning was replaced per protocol. Telemetry monitor in place.  Donato Garcia

## 2021-08-28 NOTE — PLAN OF CARE
Problem: Patient Centered Care  Goal: Patient preferences are identified and integrated in the patient's plan of care  Description: Interventions:  - What would you like us to know as we care for you?  I live at [facility] timely, complete, and suctioning and perform as needed  - Assess and instruct to report SOB or any respiratory difficulty  - Respiratory Therapy support as indicated  - Manage/alleviate anxiety  - Monitor for signs/symptoms of CO2 retention  Outcome: Progressing     Problem: GA pain and pain management  - Manage/alleviate anxiety  - Utilize distraction and/or relaxation techniques  - Monitor for opioid side effects  - Notify MD/LIP if interventions unsuccessful or patient reports new pain  - Anticipate increased pain with activit position, call light within reach. Frequent rounding by nursing staff.

## 2021-08-28 NOTE — PROGRESS NOTES
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 2. The liver is lobulated in contour, which is suspicious for cirrhotic or pseudocirrhotic hepatic morphology. No splenomegaly.  3. Right kidney is markedly atrophic wi

## 2021-08-28 NOTE — PROGRESS NOTES
NEPHROLOGY DAILY PROGRESS NOTE     Follow up Reason: JULIENNE     SUBJECTIVE:  - feels about the same as yesterday, has been trying to drink more water       OBJECTIVE:    Total Intake/Output:    Intake/Output Summary (Last 24 hours) at 8/28/2021 1215  Last kannan No splenomegaly. 3. Right kidney is markedly atrophic with mild pelviectasis, possibly representing sequela of a chronic ureteropelvic junction obstruction. 4. Uncomplicated colonic diverticulosis. 5. Small hiatal hernia. 6. Fibroid uterus.  7. Mild [facility] - Nephrology

## 2021-08-28 NOTE — CONSULTS
[facility]    Report of Consultation    Lucas Gregoryalonzo Patient Status:  Inpatient    1931 MRN [id number]   Location [facility] 5SW/SE Attending Renny Demarco MD   [facility] Day # 1 PCP Maykel Summers MD     Date of  Vaping Use      Vaping Use: Never used    Alcohol use: Not Currently      Alcohol/week: 1.0 standard drinks      Types: 1 Glasses of wine per week    Drug use: Never         Current Medications:  acetaminophen (TYLENOL) tab 650 mg, 650 mg, Oral, Q6H PRN  P Value Date    WBC 4.4 08/28/2021    HGB 10.6 (L) 08/28/2021    HCT 32.2 (L) 08/28/2021    .0 08/28/2021    CREATSERUM 2.49 (H) 08/28/2021    BUN 61 (H) 08/28/2021     (L) 08/28/2021    K 4.0 08/28/2021     08/28/2021    CO2 18.0 (L) 08/2 Carcinomatosis:  ?Cirrhosis:  JULIENNE on CKD:  - Patient presents with worsening shortness of breath and abdominal distention for the last month which did not improve with diuretic therapy.   CT A/P with peritoneal carcinomatosis, moderate ascites, omental caki

## 2021-08-28 NOTE — PHYSICAL THERAPY NOTE
Physical Therapy    Physical therapy orders received and chart reviewed. Patient declined PT at this time due to abdominal pain. Gentle activity to move her joints and utilize her muscles were encouraged.  She agreed and reports she dangled at the edge of

## 2021-08-29 LAB
ALBUMIN SERPL-MCNC: 2.3 G/DL (ref 3.4–5)
ALBUMIN/GLOB SERPL: 0.5 {RATIO} (ref 1–2)
ALP LIVER SERPL-CCNC: 51 U/L
ALT SERPL-CCNC: 11 U/L
ANION GAP SERPL CALC-SCNC: 5 MMOL/L (ref 0–18)
AST SERPL-CCNC: 22 U/L (ref 15–37)
BASOPHILS # BLD AUTO: 0.02 X10(3) UL (ref 0–0.2)
BASOPHILS NFR BLD AUTO: 0.5 %
BILIRUB SERPL-MCNC: 0.3 MG/DL (ref 0.1–2)
BUN BLD-MCNC: 56 MG/DL (ref 7–18)
BUN/CREAT SERPL: 25 (ref 10–20)
CALCIUM BLD-MCNC: 7.9 MG/DL (ref 8.5–10.1)
CHLORIDE SERPL-SCNC: 105 MMOL/L (ref 98–112)
CO2 SERPL-SCNC: 24 MMOL/L (ref 21–32)
CREAT BLD-MCNC: 2.24 MG/DL
DEPRECATED RDW RBC AUTO: 42.8 FL (ref 35.1–46.3)
EOSINOPHIL # BLD AUTO: 0.17 X10(3) UL (ref 0–0.7)
EOSINOPHIL NFR BLD AUTO: 4.5 %
ERYTHROCYTE [DISTWIDTH] IN BLOOD BY AUTOMATED COUNT: 13 % (ref 11–15)
GLOBULIN PLAS-MCNC: 4.2 G/DL (ref 2.8–4.4)
GLUCOSE BLD-MCNC: 85 MG/DL (ref 70–99)
HAV IGM SER QL: 1.6 MG/DL (ref 1.6–2.6)
HCT VFR BLD AUTO: 29.2 %
HGB BLD-MCNC: 9.4 G/DL
IMM GRANULOCYTES # BLD AUTO: 0.04 X10(3) UL (ref 0–1)
IMM GRANULOCYTES NFR BLD: 1.1 %
INR BLD: 1.28 (ref 0.9–1.2)
LYMPHOCYTES # BLD AUTO: 0.36 X10(3) UL (ref 1–4)
LYMPHOCYTES NFR BLD AUTO: 9.6 %
M PROTEIN MFR SERPL ELPH: 6.5 G/DL (ref 6.4–8.2)
MCH RBC QN AUTO: 28.9 PG (ref 26–34)
MCHC RBC AUTO-ENTMCNC: 32.2 G/DL (ref 31–37)
MCV RBC AUTO: 89.8 FL
MONOCYTES # BLD AUTO: 0.48 X10(3) UL (ref 0.1–1)
MONOCYTES NFR BLD AUTO: 12.8 %
NEUTROPHILS # BLD AUTO: 2.69 X10 (3) UL (ref 1.5–7.7)
NEUTROPHILS # BLD AUTO: 2.69 X10(3) UL (ref 1.5–7.7)
NEUTROPHILS NFR BLD AUTO: 71.5 %
OSMOLALITY SERPL CALC.SUM OF ELEC: 293 MOSM/KG (ref 275–295)
PLATELET # BLD AUTO: 364 10(3)UL (ref 150–450)
POTASSIUM SERPL-SCNC: 3.5 MMOL/L (ref 3.5–5.1)
PROTHROMBIN TIME: 15.8 SECONDS (ref 11.8–14.5)
RBC # BLD AUTO: 3.25 X10(6)UL
SODIUM SERPL-SCNC: 134 MMOL/L (ref 136–145)
WBC # BLD AUTO: 3.8 X10(3) UL (ref 4–11)

## 2021-08-29 PROCEDURE — 85610 PROTHROMBIN TIME: CPT | Performed by: INTERNAL MEDICINE

## 2021-08-29 PROCEDURE — 83735 ASSAY OF MAGNESIUM: CPT | Performed by: INTERNAL MEDICINE

## 2021-08-29 PROCEDURE — 97116 GAIT TRAINING THERAPY: CPT

## 2021-08-29 PROCEDURE — C9113 INJ PANTOPRAZOLE SODIUM, VIA: HCPCS | Performed by: INTERNAL MEDICINE

## 2021-08-29 PROCEDURE — 97161 PT EVAL LOW COMPLEX 20 MIN: CPT

## 2021-08-29 PROCEDURE — 85025 COMPLETE CBC W/AUTO DIFF WBC: CPT | Performed by: INTERNAL MEDICINE

## 2021-08-29 PROCEDURE — 94640 AIRWAY INHALATION TREATMENT: CPT

## 2021-08-29 PROCEDURE — 80053 COMPREHEN METABOLIC PANEL: CPT | Performed by: INTERNAL MEDICINE

## 2021-08-29 RX ORDER — POTASSIUM CHLORIDE 20 MEQ/1
40 TABLET, EXTENDED RELEASE ORAL ONCE
Status: COMPLETED | OUTPATIENT
Start: 2021-08-29 | End: 2021-08-29

## 2021-08-29 RX ORDER — MAGNESIUM OXIDE 400 MG (241.3 MG MAGNESIUM) TABLET
400 TABLET ONCE
Status: COMPLETED | OUTPATIENT
Start: 2021-08-29 | End: 2021-08-29

## 2021-08-29 RX ORDER — IPRATROPIUM BROMIDE AND ALBUTEROL SULFATE 2.5; .5 MG/3ML; MG/3ML
3 SOLUTION RESPIRATORY (INHALATION)
Status: DISCONTINUED | OUTPATIENT
Start: 2021-08-29 | End: 2021-08-30

## 2021-08-29 NOTE — PLAN OF CARE
Problem: Patient Centered Care  Goal: Patient preferences are identified and integrated in the patient's plan of care  Description: Interventions:  - What would you like us to know as we care for you?  I live at [facility] timely, complete, and instruct to report SOB or any respiratory difficulty  - Respiratory Therapy support as indicated  - Manage/alleviate anxiety  - Monitor for signs/symptoms of CO2 retention  Outcome: Progressing     Problem: GASTROINTESTINAL - ADULT  Goal: Maintains or retu anxiety  - Utilize distraction and/or relaxation techniques  - Monitor for opioid side effects  - Notify MD/LIP if interventions unsuccessful or patient reports new pain  - Anticipate increased pain with activity and pre-medicate as appropriate  Outcome: P diet well. Intravenous fluids infusing and tolerated. Incentive spirometer present at bedside. Patient receives scheduled duoneb treatment by respiratory therapist. Potassium level this am was 3.5, and Magnesium level this am was 1.6.  Patient received elec

## 2021-08-29 NOTE — PROGRESS NOTES
[facility] Hospitalist Progress Note     CC: Hospital Follow up    PCP: Galen Lyon MD       Assessment/Plan:     Principal Problem:    Acute kidney injury 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)  Patient is a 80year old female with PMH s weight 141 lb 6.4 oz (64.1 kg), SpO2 99 %.     Temp:  [97.1 °F (36.2 °C)-97.9 °F (36.6 °C)] 97.7 °F (36.5 °C)  Pulse:  [] 74  Resp:  [18] 18  BP: (117-139)/(70-89) 117/70      Intake/Output:    Intake/Output Summary (Last 24 hours) at 8/29/2021 1235 Imaging:  CT ABDOMEN+PELVIS(CPT=74176)    Result Date: 8/27/2021  CONCLUSION:  1. Findings highly suspicious for peritoneal carcinomatosis including moderate ascites and omental caking.  2. The liver is lobulated in contour, which is suspicious for

## 2021-08-29 NOTE — PLAN OF CARE
Problem: Patient Centered Care  Goal: Patient preferences are identified and integrated in the patient's plan of care  Description: Interventions:  - What would you like us to know as we care for you?  I live at [facility] timely, complete, and instruct to report SOB or any respiratory difficulty  - Respiratory Therapy support as indicated  - Manage/alleviate anxiety  - Monitor for signs/symptoms of CO2 retention  Outcome: Progressing     Problem: GASTROINTESTINAL - ADULT  Goal: Maintains or retu Manage/alleviate anxiety  - Utilize distraction and/or relaxation techniques  - Monitor for opioid side effects  - Notify MD/LIP if interventions unsuccessful or patient reports new pain  - Anticipate increased pain with activity and pre-medicate as approp intact, LR at 75ml/hr, no complaints.

## 2021-08-29 NOTE — PROGRESS NOTES
NEPHROLOGY DAILY PROGRESS NOTE     Follow up Reason: JULIENNE     SUBJECTIVE:  Doing ok, feels a little SOB today        OBJECTIVE:    Total Intake/Output:    Intake/Output Summary (Last 24 hours) at 8/29/2021 1336  Last data filed at 8/29/2021 1250  Gross per 08/29/2021       IMAGING:  CT ABDOMEN+PELVIS(CPT=74176)    Result Date: 8/27/2021  CONCLUSION:  1. Findings highly suspicious for peritoneal carcinomatosis including moderate ascites and omental caking.  2. The liver is lobulated in contour, which is suspic torsemide as above        Abd distension / ascites   - CT abd concerning for peritoneal carcinomatosis with moderate ascites and omental caking.  - paracentesis planned   - per primary      Anemia  - trend Hb, blood transfusions per primary      dw RN

## 2021-08-29 NOTE — PHYSICAL THERAPY NOTE
Order received and chart review completed. RN cleared pt to be seen for PT evaluation this afternoon. Pt in bed, refusing to participate at this time as she just returned to bed after being up all morning.  Educated pt on the importance of frequent mobility

## 2021-08-29 NOTE — PHYSICAL THERAPY NOTE
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 ..   -   Moving to and from a bed to a chair (including a wheelchair)?: A Little   -   Need to walk in hospital room?: A Little   -   Climbing 3-5 steps with a railing?: A Little     AM-PAC Score:  Raw Score:

## 2021-08-29 NOTE — PROGRESS NOTES
[facility] - [facility]    Progress Note    Olive Ac Patient Status:  Inpatient    1931 MRN [id number]   Location [facility] 5SW/SE Attending Curry Armstrong MD   [address] Day # 2 PCP Carissa Stephens MD     HPI/Subjective:   Contin Lesser incidental findings as above.     Dictated by (CST): Tiffany Tyler MD on 8/27/2021 at 4:03 PM     Finalized by (CST): Tiffany Tyler MD on 8/27/2021 at 4:12 PM          XR CHEST AP PORTABLE  (CPT=71045)    Result Date: 8/27/2021  CONCLUSION: perform with IV contrast when able. - Coags  - EGD +/- Colonoscopy this week  - Protonix 40 mg IV daily      Neto High M.D.   [address] [facility]  Gastroenterology

## 2021-08-30 ENCOUNTER — APPOINTMENT (OUTPATIENT)
Dept: ULTRASOUND IMAGING | Facility: HOSPITAL | Age: 86
DRG: 375 | End: 2021-08-30
Attending: EMERGENCY MEDICINE
Payer: MEDICARE

## 2021-08-30 LAB
ALBUMIN FLD-MCNC: 2.3 G/DL
ALBUMIN SERPL-MCNC: 2.7 G/DL (ref 3.4–5)
ALBUMIN/GLOB SERPL: 0.6 {RATIO} (ref 1–2)
ALP LIVER SERPL-CCNC: 57 U/L
ALT SERPL-CCNC: 12 U/L
ANION GAP SERPL CALC-SCNC: 7 MMOL/L (ref 0–18)
AST SERPL-CCNC: 28 U/L (ref 15–37)
BASOPHILS # BLD AUTO: 0.04 X10(3) UL (ref 0–0.2)
BASOPHILS NFR BLD AUTO: 0.9 %
BILIRUB SERPL-MCNC: 0.4 MG/DL (ref 0.1–2)
BUN BLD-MCNC: 53 MG/DL (ref 7–18)
BUN/CREAT SERPL: 24.5 (ref 10–20)
CALCIUM BLD-MCNC: 8.5 MG/DL (ref 8.5–10.1)
CHLORIDE SERPL-SCNC: 104 MMOL/L (ref 98–112)
CO2 SERPL-SCNC: 22 MMOL/L (ref 21–32)
COLOR FLD: YELLOW
CREAT BLD-MCNC: 2.16 MG/DL
DEPRECATED RDW RBC AUTO: 43 FL (ref 35.1–46.3)
EOSINOPHIL # BLD AUTO: 0.1 X10(3) UL (ref 0–0.7)
EOSINOPHIL NFR BLD AUTO: 2.1 %
ERYTHROCYTE [DISTWIDTH] IN BLOOD BY AUTOMATED COUNT: 13.1 % (ref 11–15)
GLOBULIN PLAS-MCNC: 4.8 G/DL (ref 2.8–4.4)
GLUCOSE BLD-MCNC: 92 MG/DL (ref 70–99)
HAV IGM SER QL: 1.7 MG/DL (ref 1.6–2.6)
HBV CORE AB SERPL QL IA: NONREACTIVE
HBV SURFACE AG SER-ACNC: <0.1 [IU]/L
HBV SURFACE AG SERPL QL IA: NONREACTIVE
HCT VFR BLD AUTO: 31.5 %
HCV AB SERPL QL IA: NONREACTIVE
HGB BLD-MCNC: 10.3 G/DL
IMM GRANULOCYTES # BLD AUTO: 0.04 X10(3) UL (ref 0–1)
IMM GRANULOCYTES NFR BLD: 0.9 %
LYMPHOCYTES # BLD AUTO: 0.38 X10(3) UL (ref 1–4)
LYMPHOCYTES NFR BLD AUTO: 8.1 %
M PROTEIN MFR SERPL ELPH: 7.5 G/DL (ref 6.4–8.2)
MCH RBC QN AUTO: 29.5 PG (ref 26–34)
MCHC RBC AUTO-ENTMCNC: 32.7 G/DL (ref 31–37)
MCV RBC AUTO: 90.3 FL
MONOCYTES # BLD AUTO: 0.51 X10(3) UL (ref 0.1–1)
MONOCYTES NFR BLD AUTO: 10.9 %
NEUTROPHILS # BLD AUTO: 3.6 X10 (3) UL (ref 1.5–7.7)
NEUTROPHILS # BLD AUTO: 3.6 X10(3) UL (ref 1.5–7.7)
NEUTROPHILS NFR BLD AUTO: 77.1 %
OSMOLALITY SERPL CALC.SUM OF ELEC: 290 MOSM/KG (ref 275–295)
PLATELET # BLD AUTO: 407 10(3)UL (ref 150–450)
POTASSIUM SERPL-SCNC: 4.2 MMOL/L (ref 3.5–5.1)
POTASSIUM SERPL-SCNC: 4.3 MMOL/L (ref 3.5–5.1)
PROT FLD-MCNC: 5.4 G/DL
RBC # BLD AUTO: 3.49 X10(6)UL
RBC # FLD: 1775 /CUMM (ref ?–1)
SODIUM SERPL-SCNC: 133 MMOL/L (ref 136–145)
WBC # BLD AUTO: 4.7 X10(3) UL (ref 4–11)
WBC # FLD: 1271 /CUMM (ref ?–1)

## 2021-08-30 PROCEDURE — 86704 HEP B CORE ANTIBODY TOTAL: CPT | Performed by: INTERNAL MEDICINE

## 2021-08-30 PROCEDURE — 88160 CYTOPATH SMEAR OTHER SOURCE: CPT | Performed by: INTERNAL MEDICINE

## 2021-08-30 PROCEDURE — 89051 BODY FLUID CELL COUNT: CPT | Performed by: INTERNAL MEDICINE

## 2021-08-30 PROCEDURE — 89050 BODY FLUID CELL COUNT: CPT | Performed by: INTERNAL MEDICINE

## 2021-08-30 PROCEDURE — 49083 ABD PARACENTESIS W/IMAGING: CPT | Performed by: EMERGENCY MEDICINE

## 2021-08-30 PROCEDURE — 87070 CULTURE OTHR SPECIMN AEROBIC: CPT | Performed by: INTERNAL MEDICINE

## 2021-08-30 PROCEDURE — C9113 INJ PANTOPRAZOLE SODIUM, VIA: HCPCS | Performed by: INTERNAL MEDICINE

## 2021-08-30 PROCEDURE — 85025 COMPLETE CBC W/AUTO DIFF WBC: CPT | Performed by: INTERNAL MEDICINE

## 2021-08-30 PROCEDURE — 86803 HEPATITIS C AB TEST: CPT | Performed by: INTERNAL MEDICINE

## 2021-08-30 PROCEDURE — 82042 OTHER SOURCE ALBUMIN QUAN EA: CPT | Performed by: INTERNAL MEDICINE

## 2021-08-30 PROCEDURE — 87205 SMEAR GRAM STAIN: CPT | Performed by: INTERNAL MEDICINE

## 2021-08-30 PROCEDURE — 84157 ASSAY OF PROTEIN OTHER: CPT | Performed by: INTERNAL MEDICINE

## 2021-08-30 PROCEDURE — 88112 CYTOPATH CELL ENHANCE TECH: CPT | Performed by: INTERNAL MEDICINE

## 2021-08-30 PROCEDURE — 94640 AIRWAY INHALATION TREATMENT: CPT

## 2021-08-30 PROCEDURE — 88341 IMHCHEM/IMCYTCHM EA ADD ANTB: CPT | Performed by: INTERNAL MEDICINE

## 2021-08-30 PROCEDURE — 84132 ASSAY OF SERUM POTASSIUM: CPT | Performed by: INTERNAL MEDICINE

## 2021-08-30 PROCEDURE — 83735 ASSAY OF MAGNESIUM: CPT | Performed by: INTERNAL MEDICINE

## 2021-08-30 PROCEDURE — 88305 TISSUE EXAM BY PATHOLOGIST: CPT | Performed by: INTERNAL MEDICINE

## 2021-08-30 PROCEDURE — 87340 HEPATITIS B SURFACE AG IA: CPT | Performed by: INTERNAL MEDICINE

## 2021-08-30 PROCEDURE — 80053 COMPREHEN METABOLIC PANEL: CPT | Performed by: INTERNAL MEDICINE

## 2021-08-30 PROCEDURE — 88342 IMHCHEM/IMCYTCHM 1ST ANTB: CPT | Performed by: INTERNAL MEDICINE

## 2021-08-30 RX ORDER — IPRATROPIUM BROMIDE AND ALBUTEROL SULFATE 2.5; .5 MG/3ML; MG/3ML
3 SOLUTION RESPIRATORY (INHALATION) EVERY 6 HOURS PRN
Status: DISCONTINUED | OUTPATIENT
Start: 2021-08-30 | End: 2021-09-03

## 2021-08-30 NOTE — PLAN OF CARE
Problem: Patient Centered Care  Goal: Patient preferences are identified and integrated in the patient's plan of care  Description: Interventions:  - What would you like us to know as we care for you?  I live at [facility] timely, complete, and instruct to report SOB or any respiratory difficulty  - Respiratory Therapy support as indicated  - Manage/alleviate anxiety  - Monitor for signs/symptoms of CO2 retention  Outcome: Progressing     Problem: GASTROINTESTINAL - ADULT  Goal: Maintains or retu Manage/alleviate anxiety  - Utilize distraction and/or relaxation techniques  - Monitor for opioid side effects  - Notify MD/LIP if interventions unsuccessful or patient reports new pain  - Anticipate increased pain with activity and pre-medicate as approp Teach and rehearse alternative coping skills  - Provide emotional support with 1:1 interaction with staff  Outcome: Not Progressing   Patient to have probable paracentesis today in IR-appears very anxious as to what it will find, at times tearful.   Ailin rangel

## 2021-08-30 NOTE — IMAGING NOTE
1034 Pt to ultrasound room #3    Hx taken: Pt admitted for SOB. Imaging confirmed Ascites. Procedure explained questions answered Patient consented on floor prior to arrival to [address].       Pt to get albumin 25% 25 grams: no     [address] Here scan

## 2021-08-30 NOTE — PLAN OF CARE
Paracentesis completed today 3485ml output per IR, patient states pain and sob improved, npo after midnight for upper endoscopy per Dr. Davon Varma.  Eliquis remains on hold    Problem: Patient/Family Goals  Goal: Patient/Family Short Term Goal  Description: Theron Shah severity of pain and evaluate response  - Implement non-pharmacological measures as appropriate and evaluate response  - Consider cultural and social influences on pain and pain management  - Manage/alleviate anxiety  - Utilize distraction and/or relaxatio

## 2021-08-30 NOTE — PROGRESS NOTES
[facility]           8/30/2021    Assessment and Plan:   1. Abdominal distention + SOB  2. Acute kidney injury on CKD; due to empiric diuretic use  3. Carcinomatosis peritonei   4.  Paroxysmal Atrial Fibrillation; rates controlled, currshahbaz No results found. CT abdomen:  CONCLUSION:   1. Findings highly suspicious for peritoneal carcinomatosis including moderate ascites and omental caking.    2. The liver is lobulated in contour, which is suspicious for cirrhotic or pseudocirrho

## 2021-08-30 NOTE — PROGRESS NOTES
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     Temp:  [97.4 °F (36.3 °C)-98.1 °F (36.7 °C)] 98.1 °F (36.7 °C)  Pulse:  [80-89] 89  Resp:  [18] 18  BP: (107-141)/(63-86) 107/68      Intake/Output:    Intake/Output Summary (Last 24 hours) 18.0*  --  24.0  --  22.0    < > = values in this interval not displayed.        Recent Labs   Lab 08/23/21  1214 08/27/21  1542 08/28/21  0606 08/29/21  0634 08/30/21  0844   ALT 6 14 12* 11* 12*   AST 21 29 24 22 28   ALB 3.8 2.9* 2.8* 2.3* 2.7*         I mcg Oral Before breakfast       acetaminophen, PEG 3350, bisacodyl, ondansetron, metoclopramide, traMADol

## 2021-08-30 NOTE — PROGRESS NOTES
[facility] - [facility]    Progress Note    Suhas Rife Patient Status:  Inpatient    1931 MRN [id number]   Location [facility] 5SW/SE Attending Matt Fernando MD   [address] Day # 3 PCP Tracy Jewell MD     HPI/Subjective:    Mila murphy abdominal distention for the last month which did not improve with diuretic therapy. CT A/P with peritoneal carcinomatosis, moderate ascites, omental caking, lobular liver morphology and fibroid uterus. CT A/P 12/2020 without evidence of these findings.

## 2021-08-30 NOTE — HOME CARE LIAISON
Received Aidin referral from [address] Spoke with patient regarding home health care services. Patient declining home health services at this time. Patient states she has RN, PT, OT services available to her through her residence at [facility].  CM Stephania Hamman

## 2021-08-30 NOTE — PROGRESS NOTES
NEPHROLOGY DAILY PROGRESS NOTE     Follow up Reason: JULIENNE     SUBJECTIVE:    Sitting comfortably in chair not in acute distress.     OBJECTIVE:    Total Intake/Output:    Intake/Output Summary (Last 24 hours) at 8/30/2021 5188  Last data filed at 8/30/2021 0 ASSESSMENT AND PLAN:   This is an 80year old female with history of  HTN, HL, hypothyroidism and CKD stage 3 in the setting of atrophic R kidney. Presents with abdominal distension and SOB.   Nephrology is consulted for JULIENNE     JULIENNE on CKD stage 3 - imp

## 2021-08-30 NOTE — CM/SW NOTE
08/30/21 1600   CM/SW Referral Data   Referral Source    Reason for Referral Discharge planning   Informant Patient   Pertinent Medical Hx   Does patient have an established PCP?  Yes  (Dr. Reuben Abarca)   Patient Info   Patient's Current Men

## 2021-08-31 ENCOUNTER — ANESTHESIA EVENT (OUTPATIENT)
Dept: ENDOSCOPY | Facility: HOSPITAL | Age: 86
DRG: 375 | End: 2021-08-31
Payer: MEDICARE

## 2021-08-31 ENCOUNTER — APPOINTMENT (OUTPATIENT)
Dept: CT IMAGING | Facility: HOSPITAL | Age: 86
DRG: 375 | End: 2021-08-31
Attending: INTERNAL MEDICINE
Payer: MEDICARE

## 2021-08-31 ENCOUNTER — ANESTHESIA (OUTPATIENT)
Dept: ENDOSCOPY | Facility: HOSPITAL | Age: 86
DRG: 375 | End: 2021-08-31
Payer: MEDICARE

## 2021-08-31 LAB
ANION GAP SERPL CALC-SCNC: 11 MMOL/L (ref 0–18)
BASOPHILS # BLD AUTO: 0.02 X10(3) UL (ref 0–0.2)
BASOPHILS NFR BLD AUTO: 0.4 %
BASOPHILS NFR PRT: 0 %
BUN BLD-MCNC: 47 MG/DL (ref 7–18)
BUN/CREAT SERPL: 22.6 (ref 10–20)
CALCIUM BLD-MCNC: 7.9 MG/DL (ref 8.5–10.1)
CANCER AG125 SERPL-ACNC: 2429.9 U/ML (ref ?–35)
CANCER AG19-9 SERPL-ACNC: 7.3 U/ML (ref ?–37)
CHLORIDE SERPL-SCNC: 104 MMOL/L (ref 98–112)
CO2 SERPL-SCNC: 21 MMOL/L (ref 21–32)
CREAT BLD-MCNC: 2.08 MG/DL
DEPRECATED HBV CORE AB SER IA-ACNC: 210.7 NG/ML
DEPRECATED RDW RBC AUTO: 42.3 FL (ref 35.1–46.3)
EOSINOPHIL # BLD AUTO: 0.01 X10(3) UL (ref 0–0.7)
EOSINOPHIL NFR BLD AUTO: 0.2 %
EOSINOPHIL NFR PRT: 0 %
ERYTHROCYTE [DISTWIDTH] IN BLOOD BY AUTOMATED COUNT: 13.1 % (ref 11–15)
FOLATE SERPL-MCNC: 17.2 NG/ML (ref 8.7–?)
GLUCOSE BLD-MCNC: 115 MG/DL (ref 70–99)
HAV IGM SER QL: 1.6 MG/DL (ref 1.6–2.6)
HCT VFR BLD AUTO: 27.8 %
HGB BLD-MCNC: 9.2 G/DL
IMM GRANULOCYTES # BLD AUTO: 0.06 X10(3) UL (ref 0–1)
IMM GRANULOCYTES NFR BLD: 1.2 %
IRON SATURATION: 12 %
IRON SERPL-MCNC: 24 UG/DL
LYMPHOCYTES # BLD AUTO: 0.33 X10(3) UL (ref 1–4)
LYMPHOCYTES NFR BLD AUTO: 6.7 %
LYMPHOCYTES NFR PRT: 44 %
MCH RBC QN AUTO: 29.4 PG (ref 26–34)
MCHC RBC AUTO-ENTMCNC: 33.1 G/DL (ref 31–37)
MCV RBC AUTO: 88.8 FL
MONOCYTES # BLD AUTO: 0.38 X10(3) UL (ref 0.1–1)
MONOCYTES NFR BLD AUTO: 7.7 %
MONOS+MACROS NFR PRT: 39 %
NEUTROPHILS # BLD AUTO: 4.11 X10 (3) UL (ref 1.5–7.7)
NEUTROPHILS # BLD AUTO: 4.11 X10(3) UL (ref 1.5–7.7)
NEUTROPHILS NFR BLD AUTO: 83.8 %
NEUTROPHILS NFR FLD: 16 %
OSMOLALITY SERPL CALC.SUM OF ELEC: 295 MOSM/KG (ref 275–295)
PLATELET # BLD AUTO: 334 10(3)UL (ref 150–450)
POTASSIUM SERPL-SCNC: 4.1 MMOL/L (ref 3.5–5.1)
RBC # BLD AUTO: 3.13 X10(6)UL
SARS-COV-2 RNA RESP QL NAA+PROBE: NOT DETECTED
SODIUM SERPL-SCNC: 136 MMOL/L (ref 136–145)
TOTAL CELLS COUNTED: 100
TOTAL IRON BINDING CAPACITY: 201 UG/DL (ref 240–450)
TRANSFERRIN SERPL-MCNC: 135 MG/DL (ref 200–360)
VIT B12 SERPL-MCNC: 955 PG/ML (ref 193–986)
WBC # BLD AUTO: 4.9 X10(3) UL (ref 4–11)
WBC OTHER NFR PRT: 1 %

## 2021-08-31 PROCEDURE — 82607 VITAMIN B-12: CPT | Performed by: INTERNAL MEDICINE

## 2021-08-31 PROCEDURE — 86301 IMMUNOASSAY TUMOR CA 19-9: CPT | Performed by: INTERNAL MEDICINE

## 2021-08-31 PROCEDURE — C9113 INJ PANTOPRAZOLE SODIUM, VIA: HCPCS | Performed by: INTERNAL MEDICINE

## 2021-08-31 PROCEDURE — 0DJ08ZZ INSPECTION OF UPPER INTESTINAL TRACT, VIA NATURAL OR ARTIFICIAL OPENING ENDOSCOPIC: ICD-10-PCS | Performed by: INTERNAL MEDICINE

## 2021-08-31 PROCEDURE — 83735 ASSAY OF MAGNESIUM: CPT | Performed by: INTERNAL MEDICINE

## 2021-08-31 PROCEDURE — 0DJD8ZZ INSPECTION OF LOWER INTESTINAL TRACT, VIA NATURAL OR ARTIFICIAL OPENING ENDOSCOPIC: ICD-10-PCS | Performed by: INTERNAL MEDICINE

## 2021-08-31 PROCEDURE — 85025 COMPLETE CBC W/AUTO DIFF WBC: CPT | Performed by: INTERNAL MEDICINE

## 2021-08-31 PROCEDURE — 86304 IMMUNOASSAY TUMOR CA 125: CPT | Performed by: INTERNAL MEDICINE

## 2021-08-31 PROCEDURE — 71250 CT THORAX DX C-: CPT | Performed by: INTERNAL MEDICINE

## 2021-08-31 PROCEDURE — 83921 ORGANIC ACID SINGLE QUANT: CPT | Performed by: INTERNAL MEDICINE

## 2021-08-31 PROCEDURE — 83540 ASSAY OF IRON: CPT | Performed by: INTERNAL MEDICINE

## 2021-08-31 PROCEDURE — 80048 BASIC METABOLIC PNL TOTAL CA: CPT | Performed by: INTERNAL MEDICINE

## 2021-08-31 PROCEDURE — 82728 ASSAY OF FERRITIN: CPT | Performed by: INTERNAL MEDICINE

## 2021-08-31 PROCEDURE — 82746 ASSAY OF FOLIC ACID SERUM: CPT | Performed by: INTERNAL MEDICINE

## 2021-08-31 PROCEDURE — 84466 ASSAY OF TRANSFERRIN: CPT | Performed by: INTERNAL MEDICINE

## 2021-08-31 RX ORDER — SODIUM CHLORIDE, SODIUM LACTATE, POTASSIUM CHLORIDE, CALCIUM CHLORIDE 600; 310; 30; 20 MG/100ML; MG/100ML; MG/100ML; MG/100ML
INJECTION, SOLUTION INTRAVENOUS CONTINUOUS
Status: DISCONTINUED | OUTPATIENT
Start: 2021-08-31 | End: 2021-08-31

## 2021-08-31 RX ORDER — MAGNESIUM SULFATE HEPTAHYDRATE 40 MG/ML
2 INJECTION, SOLUTION INTRAVENOUS ONCE
Status: COMPLETED | OUTPATIENT
Start: 2021-08-31 | End: 2021-08-31

## 2021-08-31 RX ORDER — NALOXONE HYDROCHLORIDE 0.4 MG/ML
80 INJECTION, SOLUTION INTRAMUSCULAR; INTRAVENOUS; SUBCUTANEOUS AS NEEDED
Status: DISCONTINUED | OUTPATIENT
Start: 2021-08-31 | End: 2021-08-31 | Stop reason: HOSPADM

## 2021-08-31 RX ORDER — EPHEDRINE SULFATE 50 MG/ML
INJECTION INTRAVENOUS AS NEEDED
Status: DISCONTINUED | OUTPATIENT
Start: 2021-08-31 | End: 2021-08-31 | Stop reason: SURG

## 2021-08-31 RX ORDER — LIDOCAINE HYDROCHLORIDE 10 MG/ML
INJECTION, SOLUTION EPIDURAL; INFILTRATION; INTRACAUDAL; PERINEURAL AS NEEDED
Status: DISCONTINUED | OUTPATIENT
Start: 2021-08-31 | End: 2021-08-31 | Stop reason: SURG

## 2021-08-31 RX ADMIN — SODIUM CHLORIDE, SODIUM LACTATE, POTASSIUM CHLORIDE, CALCIUM CHLORIDE: 600; 310; 30; 20 INJECTION, SOLUTION INTRAVENOUS at 15:22:00

## 2021-08-31 RX ADMIN — LIDOCAINE HYDROCHLORIDE 25 MG: 10 INJECTION, SOLUTION EPIDURAL; INFILTRATION; INTRACAUDAL; PERINEURAL at 15:25:00

## 2021-08-31 RX ADMIN — EPHEDRINE SULFATE 10 MG: 50 INJECTION INTRAVENOUS at 15:53:00

## 2021-08-31 RX ADMIN — SODIUM CHLORIDE, SODIUM LACTATE, POTASSIUM CHLORIDE, CALCIUM CHLORIDE: 600; 310; 30; 20 INJECTION, SOLUTION INTRAVENOUS at 15:59:00

## 2021-08-31 NOTE — PHYSICAL THERAPY NOTE
patient in bed drinking solution for colonoscopy. She refused therapy activity d/t going to bathroom often. RN aware. Will try to see tomorrow.

## 2021-08-31 NOTE — ANESTHESIA POSTPROCEDURE EVALUATION
Patient: Kamla Ospina    Procedure Summary     Date: 08/31/21 Room / Location: [address] [place] [address] ENDOSCOPY 01 / [address] [place] [address] ENDOSCOPY    Anesthesia Start: 0136 Anesthesia Stop: 8543    Procedures:       ESOPHAGOGASTRODUODENOSCOPY (EGD) (N/A )      COLONOSCOPY (N/A ) Diagnos

## 2021-08-31 NOTE — PROGRESS NOTES
[facility]    Cardiology Progress Note    Richar Rios Patient Status:  Inpatient    1931 MRN [id number]   Location [facility] 5SW/SE Attending Ariane Buckley [address] [facility] Day # 4 PCP Tatianna Lezama MD       [facility] rhonchi or dullness. Normal excursions and effort. Abdomen: Soft, non-tender. No organosplenomegally, mass or rebound, BS-present. Extremities: Without clubbing, cyanosis or edema. Peripheral pulses are 2+.   Neurologic: Alert and oriented, normal affec 10 mg, Rectal, Daily PRN  ondansetron (ZOFRAN) injection 4 mg, 4 mg, Intravenous, Q6H PRN  metoclopramide (REGLAN) injection 5 mg, 5 mg, Intravenous, Q8H PRN  atorvastatin (LIPITOR) tab 10 mg, 10 mg, Oral, Daily  levothyroxine (SYNTHROID) tab 75 mcg, 75 mc

## 2021-08-31 NOTE — PLAN OF CARE
Patient is alert and oriented. RA. Patient refusing to wear SCDs, educated patient on importance of wearing. Tele in place. Voiding via roberson. Up x1 with walker. Pt finished golytely, frequent liquid stools.  Patient complains of pain to abdomen, declined p for changes in respiratory status  - Assess for changes in mentation and behavior  - Position to facilitate oxygenation and minimize respiratory effort  - Oxygen supplementation based on oxygen saturation or ABGs  - Encourage broncho-pulmonary hygiene incl goal  Description: INTERVENTIONS:  - Encourage pt to monitor pain and request assistance  - Assess pain using appropriate pain scale  - Administer analgesics based on type and severity of pain and evaluate response  - Implement non-pharmacological measures Department for coordinating discharge planning if the patient needs post-hospital services based on physician/LIP order or complex needs related to functional status, cognitive ability or social support system  Outcome: Progressing     Problem: ANXIETY  Go

## 2021-08-31 NOTE — SPIRITUAL CARE NOTE
Pt was sitting in the chair, expressed her concern that she is waiting for some test result, and she hopes it is not cancer. She requested a prayer for good health which  did.  Pt shared that she lives in [facility] in independent living, very happy

## 2021-08-31 NOTE — PROGRESS NOTES
NEPHROLOGY DAILY PROGRESS NOTE     Follow up Reason: JULIENNE     SUBJECTIVE:    Sitting comfortably in bed not in acute distress.     OBJECTIVE:    Total Intake/Output:    Intake/Output Summary (Last 24 hours) at 8/31/2021 1230  Last data filed at 8/31/2021 100 (YFG=61839)    Result Date: 2021  CONCLUSION: Ultrasound guided paracentesis, as detailed above.    Supervising Physician: Jose Juan Sanchez MD.    Dictated by (CST): Varinder Sierra on 2021 at 12:02 PM     Finalized by (CST): Varinder Sierra on

## 2021-08-31 NOTE — PRE-SEDATION ASSESSMENT
Acute illness visit note    Chief Complaint   Patient presents with   • Ear Problem     Follow Up Rocephin   .    HISTORY OF PRESENT ILLNESS: Melchor Vail is a 11 month old male who presents to follow up on a persistent ear infection. He is here with his mom.     Melchor was seen 2 days ago for a persistent ear infection (course is below). He received ceftriaxone on 8/12/2021. His ears had improved but there was still purulent fluid behind the enter TM on each side. He received ceftriaxone #2 on 8/14/2021.     Mom states he seems to be feeling better. He slept through the night. He is playing during the day, fussy at times but the fussiness is not worse. He is eating and drinking. He has not had tylenol or ibuprofen. He has not had a fever, runny nose, congestion, cough, vomiting, diarrhea or rash since seen 2 days ago.     To summarize his course:  · Seen 7/7/2021 in urgent care for cold symptoms and a new fever. He had bilateral ear infections and was treated with amoxicillin.   · Seen in clinic on 7/21/2021 because he was still fussy and not sleeping well after being treated for the ear infection. He had persistent bilateral ear infections and purulent rhinitis. He was treated with Augmentin.   · Seen in clinic 8/3/2021. He seemed to improve with Augmentin but then irritability and poor sleep returned. He had some runny nose and cough that started again as well. He had bilateral ear infections and was treated with cefdinir. Covid test was done and was negative.   · Seen 8/12/2021 for fussiness and night waking, had bilateral ear infections, treated with ceftriaxone. Follow up 08/14/2021, improved as TMs not bulging, still with purulent fluid in the middle ear space behind the entire TM. Received ceftriaxone #2.      He is in . No bottle in his crib. No smokers at home. No sneezing, nasal rubbing / itching, watery eyes or eye rubbing.      His sister had ear tubes placed at one year of age.     The  Physician Pre-Sedation Assessment    Pre-Sedation Assessment:    Sedation History: Previous Sedation with No Complications and Airway Assessed    Cardiac: normal S1, S2  Respiratory: breath sounds clear bilaterally   Abdomen: soft, BS (+), non-tender    AS problem list was reviewed and updated as follows:  Patient Active Problem List    Diagnosis Date Noted   • Otitis media      Priority: Low       Allergies as of 08/16/2021   • (No Known Allergies)       Medications:  Current Outpatient Medications   Medication Sig   • ibuprofen (MOTRIN) 200 MG tablet Take 200 mg by mouth every 6 hours as needed for Pain.   • ACETAMINOPHEN CHILDRENS PO      No current facility-administered medications for this visit.       Physical Exam  Vitals:  Visit Vitals  Pulse 124   Temp 97.6 °F (36.4 °C) (Other (comment))   Resp 34   Wt 11 kg   ·   · General:  Alert, no distress.  Interactive. Smiles, plays.   · Head: Normocephalic.   · Eyes:  Conjunctivae clear bilaterally, no drainage. PERRL, EOMI. Normal eyelids.   · Ears:  Bilateral TMs are pink, landmarks seen, mucoid fluid inferior aspect no bulging. Canals normal.   · Nose:  Nasal discharge:  no.  Turbinates are normal.    · Oropharynx:  Normal - mucous membranes moist. No erythema, no exudate. Tonsils 2+.   · Neck:  No lymphadenopathy or masses. + full range of motion.   · Lungs:  Clear to auscultation.  Respiratory effort normal  · Heart:  Regular rate and rhythm without murmur  · Neuro: Face symmetric. Normal gait.     Assessment:   Persistent bilateral otitis media. He has had a course of amoxicillin, Augmentin and then cefdinir since 7/7/2021. He has had cold symptoms with previous infections, they have resolved. He does not have allergy symptoms. His sister did have tubes placed when she was one year old.      He received ceftriaxone on 8/12/2021 and 8/14/2021. His ears have improved. The TMs are now pink, there is mucoid fluid inferior aspect on both sides. There is no bulging.         Plan:   · Supportive care - tylenol or ibuprofen as needed for pain (has not needed this since sen 2 days ago).   · Ceftriaxone 600 mg IM (50 mg/kg) given in clinic based as he has mucoid fluid on exam, this is the 3rd and last dose of ceftriaxone.  Patient tolerated well, no complications.   · Referral to ENT was done on 8/12/2021 visit given his course and sister's history of needing tubes at one year of age. Patient has seen Dr. Mora before (for tongue tie).   · Will follow up prior to ENT appointment or well check (in one week) if concerns.     Latasha Martinez MD

## 2021-08-31 NOTE — OPERATIVE REPORT
EGD/Colonoscopy Operative Report    Mayra Woodall Patient Status:  Inpatient    1931 MRN [id number] monitored anesthesia provided by an anesthesiologist    Moderate Sedation Time: None. Deep sedation provided by anesthesia.     Cecum Withdrawal Time:  N/A    Date of previous colonoscopy: None    Procedure Description: The patient was placed in the left l replaced with EGD scope. The scope was advanced to about the mid sigmoid colon, however could not be advanced further due to continued adhesive disease and angulation along with severe diverticulosis.   There was significant resistance to passage of the sc

## 2021-08-31 NOTE — ANESTHESIA PREPROCEDURE EVALUATION
Anesthesia PreOp Note    HPI:     Mukesh Ferrer is a 80year old female who presents for preoperative consultation requested by: Zhanna Simon MD    Date of Surgery: 8/27/2021 - 8/31/2021    Procedure(s):  ESOPHAGOGASTRODUODENOSCOPY (EGD)  Alex Kennedy 135 tablet, Rfl: 3, 8/27/2021  atorvastatin 10 MG Oral Tab, Take 1 tablet (10 mg total) by mouth daily. , Disp: 90 tablet, Rfl: 3, 8/26/2021  Cholecalciferol (VITAMIN D) 50 MCG (2000 UT) Oral Cap, Take by mouth., Disp: , Rfl: , 8/27/2021  Neomycin-Polymyxin children: Not on file      Years of education: Not on file      Highest education level: Not on file    Occupational History      Not on file    Tobacco Use      Smoking status: Never Smoker      Smokeless tobacco: Never Used    Vaping Use      Vaping Use: 08/23/2021    MCHC 33.1 08/31/2021    MCHC 31.6 08/23/2021    RDW 13.1 08/31/2021    RDW 13.2 08/23/2021    .0 08/31/2021     08/23/2021     Lab Results   Component Value Date     08/31/2021     08/23/2021    K 4.1 08/31/2021    K Paracentesis yesterday for 3.5L with good symptomatic relief. Newly discovered carcinamotosis and peritoneal ascites. Cirrhosis.       Endo/Other    (+) hypothyroidism (s/p surgery many years ago),   Abdominal                Anesthesia Plan:   ASA:  3

## 2021-09-01 ENCOUNTER — APPOINTMENT (OUTPATIENT)
Dept: ULTRASOUND IMAGING | Facility: HOSPITAL | Age: 86
DRG: 375 | End: 2021-09-01
Attending: HOSPITALIST
Payer: MEDICARE

## 2021-09-01 PROBLEM — C78.6 PERITONEAL CARCINOMATOSIS (HCC): Status: ACTIVE | Noted: 2021-09-01

## 2021-09-01 LAB
ALBUMIN SERPL-MCNC: 2.2 G/DL (ref 3.4–5)
ANION GAP SERPL CALC-SCNC: 9 MMOL/L (ref 0–18)
BUN BLD-MCNC: 44 MG/DL (ref 7–18)
BUN/CREAT SERPL: 21.2 (ref 10–20)
CALCIUM BLD-MCNC: 8 MG/DL (ref 8.5–10.1)
CHLORIDE SERPL-SCNC: 102 MMOL/L (ref 98–112)
CO2 SERPL-SCNC: 24 MMOL/L (ref 21–32)
CREAT BLD-MCNC: 2.08 MG/DL
GLUCOSE BLD-MCNC: 98 MG/DL (ref 70–99)
HAV IGM SER QL: 2.2 MG/DL (ref 1.6–2.6)
OSMOLALITY SERPL CALC.SUM OF ELEC: 291 MOSM/KG (ref 275–295)
PHOSPHATE SERPL-MCNC: 3.5 MG/DL (ref 2.5–4.9)
POTASSIUM SERPL-SCNC: 3.6 MMOL/L (ref 3.5–5.1)
SODIUM SERPL-SCNC: 135 MMOL/L (ref 136–145)

## 2021-09-01 PROCEDURE — 97110 THERAPEUTIC EXERCISES: CPT

## 2021-09-01 PROCEDURE — C9113 INJ PANTOPRAZOLE SODIUM, VIA: HCPCS | Performed by: HOSPITALIST

## 2021-09-01 PROCEDURE — 76705 ECHO EXAM OF ABDOMEN: CPT | Performed by: HOSPITALIST

## 2021-09-01 PROCEDURE — 49083 ABD PARACENTESIS W/IMAGING: CPT | Performed by: HOSPITALIST

## 2021-09-01 PROCEDURE — 80069 RENAL FUNCTION PANEL: CPT | Performed by: HOSPITALIST

## 2021-09-01 PROCEDURE — 83735 ASSAY OF MAGNESIUM: CPT | Performed by: HOSPITALIST

## 2021-09-01 PROCEDURE — 97116 GAIT TRAINING THERAPY: CPT

## 2021-09-01 RX ORDER — POTASSIUM CHLORIDE 20 MEQ/1
40 TABLET, EXTENDED RELEASE ORAL ONCE
Status: COMPLETED | OUTPATIENT
Start: 2021-09-01 | End: 2021-09-01

## 2021-09-01 NOTE — IMAGING NOTE
1430 Pt to ultrasound room scouts taken by ARJUN BUNN RT    HR 87 O2 SAT 97%, /71    Hx taken procedure explained questions answered     Patient consented @ 1414 on unit.     Pt to get albumin 25% 25 grams yes or no     MAHENDRA AYON  Here to access- s

## 2021-09-01 NOTE — PROGRESS NOTES
[facility] - [facility]    Cardiology Progress Note    Mercy Gibbs Patient Status:  Inpatient    1931 MRN [id number]   Location [facility] 5SW/SE Attending Candy Lamar MD   Hosp Day # 5 PCP Luis Alberto Gerard MD       Impress wheezes, rales, rhonchi or dullness. Normal excursions and effort. Abdomen: Soft, non-tender. No organosplenomegally, mass or rebound, BS-present. Extremities: Without clubbing, cyanosis or edema. Peripheral pulses are 2+.   Neurologic: Alert and orient 650 mg, 650 mg, Oral, Q6H PRN  PEG 3350 (MIRALAX) powder packet 17 g, 17 g, Oral, Daily PRN  bisacodyl (DULCOLAX) rectal suppository 10 mg, 10 mg, Rectal, Daily PRN  ondansetron (ZOFRAN) injection 4 mg, 4 mg, Intravenous, Q6H PRN  metoclopramide (REGLAN) i

## 2021-09-01 NOTE — PLAN OF CARE
Problem: Patient/Family Goals  Goal: Patient/Family Long Term Goal  Description: Patient's Long Term Goal: Go back home    Interventions:  - Monitor vital signs  - Monitor appropriate labs  - Pain management  - Administer medications per order  - Follow collaborating with pharmacy to review patient's medication profile  Outcome: Progressing    Patient returned from EGD/colonoscopy,  no nausea, abdomen distended no c/o pain at this time.   Parisi intact and in place

## 2021-09-01 NOTE — PROGRESS NOTES
[facility]  Gastroenterology Progress Note    Heidi Whitley Patient Status:  Inpatient    1931 MRN [id number]   Location [facility] 5SW/SE Attending Carissa Lara MD   Hosp Day # 5 PCP Celia Jarquin MD     Subjective:  D (CPT=71250)    Result Date: 8/31/2021  CONCLUSION:   0.4 cm left upper lobe pulmonary nodule. Recommend follow-up chest CT as clinically warranted. No evidence of adenopathy in the chest.  Trace right pleural effusion.   Upper abdominal ascites which may periodic paracentesis. This is per heme-onc recommendations. 3.  No further GI work-up at this time. We will sign off for now.   please call with questions      Jesus Moore MD    9/1/2021  3:52 PM

## 2021-09-01 NOTE — PLAN OF CARE
Problem: Patient Centered Care  Goal: Patient preferences are identified and integrated in the patient's plan of care  Description: Interventions:  - What would you like us to know as we care for you?  I live at [facility] timely, complete, and instruct to report SOB or any respiratory difficulty  - Respiratory Therapy support as indicated  - Manage/alleviate anxiety  - Monitor for signs/symptoms of CO2 retention  Outcome: Progressing     Problem: GASTROINTESTINAL - ADULT  Goal: Maintains or retu anxiety  - Utilize distraction and/or relaxation techniques  - Monitor for opioid side effects  - Notify MD/LIP if interventions unsuccessful or patient reports new pain  - Anticipate increased pain with activity and pre-medicate as appropriate  Outcome: P alternative coping skills  - Provide emotional support with 1:1 interaction with staff  Outcome: Progressing

## 2021-09-01 NOTE — PHYSICAL THERAPY NOTE
PHYSICAL THERAPY TREATMENT NOTE - INPATIENT     Room Number: 560/560-A       Presenting Problem:  JULIENNE    Problem List  Principal Problem:    Acute kidney injury [facility])  Active Problems:    Carcinomatosis peritonei ([facility] [address]      PHYSICAL THERAPY ASSESSMENT     P '6-Clicks' INPATIENT SHORT FORM - BASIC MOBILITY  How much difficulty does the patient currently have. ..  -   Turning over in bed (including adjusting bedclothes, sheets and blankets)?: None   -   Sitting down on and standing up from a chair with arms (e.g Patient to demonstrate independence with home activity/exercise instructions provided to patient in preparation for discharge.

## 2021-09-01 NOTE — PROGRESS NOTES
[facility] Hospitalist Progress Note     CC: Hospital Follow up    PCP: Mainor Armenta MD       Assessment/Plan:     Principal Problem:    Acute kidney injury [facility])  Active Problems:    Carcinomatosis peritonei ([facility] [address]    Ms. Keerthi Johnson is an 80year old female wit recommendations pending patient's clinical course.   [facility] hospitalist to continue to follow patient while in house     Patient and/or patient's family given opportunity to ask questions and note understanding and agreeing with therapeutic plan as outlined 8.0*   * 136 135*   K 4.2 4.1 3.6    104 102   CO2 22.0 21.0 24.0       Recent Labs   Lab 08/27/21  1542 08/28/21  0606 08/29/21  0634 08/30/21  0844 09/01/21  0517   ALT 14 12* 11* 12*  --    AST 29 24 22 28  --    ALB 2.9* 2.8* 2.3* 2.7* 2.2*

## 2021-09-01 NOTE — PROGRESS NOTES
NEPHROLOGY DAILY PROGRESS NOTE     Follow up Reason: JULIENNE     SUBJECTIVE:    Sitting comfortably in bed not in acute distress.     OBJECTIVE:    Total Intake/Output:    Intake/Output Summary (Last 24 hours) at 9/1/2021 1318  Last data filed at 9/1/2021 3125 09/01/2021    PTH 55.1 05/27/2021    WBC 4.9 08/31/2021    HCT 27.8 (L) 08/31/2021    .0 08/31/2021       IMAGING:  CT CHEST (CPT=71250)    Result Date: 8/31/2021  CONCLUSION:   0.4 cm left upper lobe pulmonary nodule.   Recommend follow-up chest CT

## 2021-09-01 NOTE — CONSULTS
Hematology/Oncology Consult Note        NAME: Grayland Shone - [facility] - MRN: [id number] - Age: 80year old - : 1931    Reason for Consult:  peritoneal carcinomatosis    Patient is a 80 y.o female who is currently admitted for peritoneal kg/m²   Physical Exam:   General: alert, cooperative, no respiratory distress. Head: Normocephalic, without obvious abnormality, atraumatic. Lungs: decreased bs bilaterally    Heart: Regular rate and rhythm, normal S1S2, no murmur.    Abdomen: soft, non findings as above.      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

## 2021-09-01 NOTE — PROGRESS NOTES
Cytology with malignant cells as expected. Primary malignancy evaluation consistent with primary peritoneal carcinoma.

## 2021-09-02 LAB
ALBUMIN SERPL-MCNC: 2 G/DL (ref 3.4–5)
ANION GAP SERPL CALC-SCNC: 6 MMOL/L (ref 0–18)
APTT PPP: 37.2 SECONDS (ref 23.2–35.3)
BUN BLD-MCNC: 40 MG/DL (ref 7–18)
BUN/CREAT SERPL: 20.6 (ref 10–20)
CALCIUM BLD-MCNC: 7.8 MG/DL (ref 8.5–10.1)
CHLORIDE SERPL-SCNC: 104 MMOL/L (ref 98–112)
CO2 SERPL-SCNC: 25 MMOL/L (ref 21–32)
CREAT BLD-MCNC: 1.94 MG/DL
GLUCOSE BLD-MCNC: 92 MG/DL (ref 70–99)
HAV IGM SER QL: 1.9 MG/DL (ref 1.6–2.6)
INR BLD: 1.15 (ref 0.9–1.2)
MMA: 0.23 UMOL/L
OSMOLALITY SERPL CALC.SUM OF ELEC: 289 MOSM/KG (ref 275–295)
PHOSPHATE SERPL-MCNC: 2.8 MG/DL (ref 2.5–4.9)
POTASSIUM SERPL-SCNC: 4.2 MMOL/L (ref 3.5–5.1)
PROTHROMBIN TIME: 14.5 SECONDS (ref 11.8–14.5)
SODIUM SERPL-SCNC: 135 MMOL/L (ref 136–145)

## 2021-09-02 PROCEDURE — 85610 PROTHROMBIN TIME: CPT | Performed by: SURGERY

## 2021-09-02 PROCEDURE — 80069 RENAL FUNCTION PANEL: CPT | Performed by: INTERNAL MEDICINE

## 2021-09-02 PROCEDURE — 83735 ASSAY OF MAGNESIUM: CPT | Performed by: INTERNAL MEDICINE

## 2021-09-02 PROCEDURE — 85730 THROMBOPLASTIN TIME PARTIAL: CPT | Performed by: SURGERY

## 2021-09-02 PROCEDURE — C9113 INJ PANTOPRAZOLE SODIUM, VIA: HCPCS | Performed by: HOSPITALIST

## 2021-09-02 RX ORDER — CEFAZOLIN SODIUM/WATER 2 G/20 ML
2 SYRINGE (ML) INTRAVENOUS
Status: COMPLETED | OUTPATIENT
Start: 2021-09-03 | End: 2021-09-03

## 2021-09-02 NOTE — PROGRESS NOTES
RN called saying that the pt would like to speak to a . Upon arrival in room, pt was in bedside chair and the  from [facility] (her residence) was at her side. No need for visit now but may follow up later.      09/02/21 1123   Clinical En

## 2021-09-02 NOTE — PROGRESS NOTES
[facility]    Cardiology Progress Note    Kamla Ospina Patient Status:  Inpatient    1931 MRN [id number]   Location [facility] 5SW/SE Attending Dorothea Merida MD   Hosp Day # 6 PCP Curt Escalera MD       Impress without wheezes, rales, rhonchi or dullness. Normal excursions and effort. Abdomen: Soft, non-tender  Extremities: Without clubbing, cyanosis or edema. Peripheral pulses are 2+. Neurologic: Alert and oriented, normal affect.  No motor or coordinational pelviectasis, possibly representing sequela of a chronic ureteropelvic junction obstruction. 4. Uncomplicated colonic diverticulosis. 5. Small hiatal hernia. 6. Fibroid uterus.    7. Mild superior endplate compression fracture in the T8 vertebral body

## 2021-09-02 NOTE — PROGRESS NOTES
NEPHROLOGY DAILY PROGRESS NOTE     Follow up Reason: JULIENNE     SUBJECTIVE:    Sitting comfortably in chair not in acute distress.     OBJECTIVE:    Total Intake/Output:    Intake/Output Summary (Last 24 hours) at 9/2/2021 1328  Last data filed at 9/2/2021 051 40 (H) 09/02/2021    CREATSERUM 1.94 (H) 09/02/2021    PTH 55.1 05/27/2021    WBC 4.9 08/31/2021    HCT 27.8 (L) 08/31/2021    .0 08/31/2021       IMAGING:  No results found.      ASSESSMENT AND PLAN:   This is an 80year old female with history of

## 2021-09-02 NOTE — PLAN OF CARE
Obtained care of patient from [facility]; patient stable and able to make needs known. Plan is for chemo port to be placed tomorrow and then discharge home with outpatient chemotherapy. POC, medications, and safety discussed with patient.  Call light within

## 2021-09-02 NOTE — PLAN OF CARE
Patient resting in bed. No acute changes. Safety measures maintained. Care passed to [facility].    Problem: Patient Centered Care  Goal: Patient preferences are identified and integrated in the patient's plan of care  Description: Interventions:  - What wou Incentive Spirometry  - Assess the need for suctioning and perform as needed  - Assess and instruct to report SOB or any respiratory difficulty  - Respiratory Therapy support as indicated  - Manage/alleviate anxiety  - Monitor for signs/symptoms of CO2 ret response  - Consider cultural and social influences on pain and pain management  - Manage/alleviate anxiety  - Utilize distraction and/or relaxation techniques  - Monitor for opioid side effects  - Notify MD/LIP if interventions unsuccessful or patient rep manageable levels  Description: INTERVENTIONS:  - Administer medication as ordered  - Teach and rehearse alternative coping skills  - Provide emotional support with 1:1 interaction with staff  Outcome: Progressing

## 2021-09-02 NOTE — PLAN OF CARE
Patient sitting in chair. No acute changes. Safety measures maintained.    Problem: Patient Centered Care  Goal: Patient preferences are identified and integrated in the patient's plan of care  Description: Interventions:  - What would you like us to know a Assess the need for suctioning and perform as needed  - Assess and instruct to report SOB or any respiratory difficulty  - Respiratory Therapy support as indicated  - Manage/alleviate anxiety  - Monitor for signs/symptoms of CO2 retention  Outcome: Suly social influences on pain and pain management  - Manage/alleviate anxiety  - Utilize distraction and/or relaxation techniques  - Monitor for opioid side effects  - Notify MD/LIP if interventions unsuccessful or patient reports new pain  - Anticipate increa INTERVENTIONS:  - Administer medication as ordered  - Teach and rehearse alternative coping skills  - Provide emotional support with 1:1 interaction with staff  Outcome: Progressing

## 2021-09-02 NOTE — CM/SW NOTE
Per Dr Dale Zaidi is for port placement tomorrow am and possible dc after. Pt has concerns about getting her car home as she drove herself to the hospital.    CM called liaison [facility] re car, PP will transport car back to [facility].     CM met with pt to disc

## 2021-09-02 NOTE — PROGRESS NOTES
Hematology/Oncology follow up note        NAME: Mayra Woodall - ROOM: 560/560-A - MRN: [id number] - Age: 80year old - : 1931    Subjective :   Coping well with the diagnosis and plan  Getting port tomorrow morning     Past Medical History:   D positive BS. Extremity: no edema   Skin: No rashes or lesions.     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

## 2021-09-02 NOTE — CONSULTS
[facility]    Report of Consultation    Vilma Ear Patient Status:  Inpatient    1931 MRN [id number]   Location [facility] 5SW/SE Attending Josh Connolly MD   [facility] Day # 6 PCP Uriel Zuniga MD     Date of A Intravenous, Once  •  ipratropium-albuterol (DUONEB) nebulizer solution 3 mL, 3 mL, Nebulization, Q6H PRN  •  metoprolol tartrate (LOPRESSOR) tab 25 mg, 25 mg, Oral, 2x Daily(Beta Blocker)  •  Pantoprazole Sodium (PROTONIX) 40 mg in Sodium Chloride (PF) 0. deficit.     Laboratory Data:  Lab Results   Component Value Date    WBC 4.9 08/31/2021    HGB 9.2 (L) 08/31/2021    HCT 27.8 (L) 08/31/2021    .0 08/31/2021    CREATSERUM 1.94 (H) 09/02/2021    BUN 40 (H) 09/02/2021     (L) 09/02/2021    K 4.2

## 2021-09-02 NOTE — PROGRESS NOTES
[facility] Hospitalist Progress Note     CC: Hospital Follow up    PCP: Toya Aschoff, MD       Assessment/Plan:     Principal Problem:    Acute kidney injury [facility])  Active Problems:    Carcinomatosis peritonei ([facility] [address]    Ms. Katie Rodas is an 80year old female wit Atrophy: PT  Lines: Piv     Dispo: possible discharge tomorrow after port placement     Outpatient records or previous hospital records reviewed.      Further recommendations pending patient's clinical course.   [facility] hospitalist to continue to follow patie 334.0         Recent Labs   Lab 08/31/21  0532 09/01/21  0517 09/02/21  0538   * 98 92   BUN 47* 44* 40*   CREATSERUM 2.08* 2.08* 1.94*   GFRAA 24* 24* 26*   GFRNAA 21* 21* 22*   CA 7.9* 8.0* 7.8*    135* 135*   K 4.1 3.6 4.2    102 104

## 2021-09-03 ENCOUNTER — ANESTHESIA EVENT (OUTPATIENT)
Dept: SURGERY | Facility: HOSPITAL | Age: 86
DRG: 375 | End: 2021-09-03
Payer: MEDICARE

## 2021-09-03 ENCOUNTER — APPOINTMENT (OUTPATIENT)
Dept: GENERAL RADIOLOGY | Facility: HOSPITAL | Age: 86
DRG: 375 | End: 2021-09-03
Attending: SURGERY
Payer: MEDICARE

## 2021-09-03 ENCOUNTER — ANESTHESIA (OUTPATIENT)
Dept: SURGERY | Facility: HOSPITAL | Age: 86
DRG: 375 | End: 2021-09-03
Payer: MEDICARE

## 2021-09-03 VITALS
DIASTOLIC BLOOD PRESSURE: 72 MMHG | HEIGHT: 60 IN | RESPIRATION RATE: 20 BRPM | WEIGHT: 138.31 LBS | OXYGEN SATURATION: 98 % | BODY MASS INDEX: 27.16 KG/M2 | HEART RATE: 68 BPM | SYSTOLIC BLOOD PRESSURE: 138 MMHG | TEMPERATURE: 97 F

## 2021-09-03 LAB
ALBUMIN SERPL-MCNC: 2 G/DL (ref 3.4–5)
ANION GAP SERPL CALC-SCNC: 5 MMOL/L (ref 0–18)
BUN BLD-MCNC: 36 MG/DL (ref 7–18)
BUN/CREAT SERPL: 20.3 (ref 10–20)
CALCIUM BLD-MCNC: 8 MG/DL (ref 8.5–10.1)
CHLORIDE SERPL-SCNC: 104 MMOL/L (ref 98–112)
CO2 SERPL-SCNC: 27 MMOL/L (ref 21–32)
CREAT BLD-MCNC: 1.77 MG/DL
GLUCOSE BLD-MCNC: 85 MG/DL (ref 70–99)
HAV IGM SER QL: 1.8 MG/DL (ref 1.6–2.6)
OSMOLALITY SERPL CALC.SUM OF ELEC: 290 MOSM/KG (ref 275–295)
PHOSPHATE SERPL-MCNC: 3 MG/DL (ref 2.5–4.9)
POTASSIUM SERPL-SCNC: 4.6 MMOL/L (ref 3.5–5.1)
SODIUM SERPL-SCNC: 136 MMOL/L (ref 136–145)

## 2021-09-03 PROCEDURE — 0JH60XZ INSERTION OF TUNNELED VASCULAR ACCESS DEVICE INTO CHEST SUBCUTANEOUS TISSUE AND FASCIA, OPEN APPROACH: ICD-10-PCS | Performed by: SURGERY

## 2021-09-03 PROCEDURE — 71045 X-RAY EXAM CHEST 1 VIEW: CPT | Performed by: SURGERY

## 2021-09-03 PROCEDURE — 83735 ASSAY OF MAGNESIUM: CPT | Performed by: INTERNAL MEDICINE

## 2021-09-03 PROCEDURE — 80069 RENAL FUNCTION PANEL: CPT | Performed by: INTERNAL MEDICINE

## 2021-09-03 PROCEDURE — 77001 FLUOROGUIDE FOR VEIN DEVICE: CPT | Performed by: SURGERY

## 2021-09-03 PROCEDURE — 06H033Z INSERTION OF INFUSION DEVICE INTO INFERIOR VENA CAVA, PERCUTANEOUS APPROACH: ICD-10-PCS | Performed by: SURGERY

## 2021-09-03 DEVICE — POWERPORT ISP M.R.I. IMPLANTABLE PORT WITH ATTACHABLE 8F CHRONOFLEX OPEN-ENDED SINGLE-LUMEN VENOUS CATHETER INTERMEDIATE KIT (WITH SUTURE-PLUGS)
Type: IMPLANTABLE DEVICE | Site: CHEST | Status: FUNCTIONAL
Brand: POWERPORT M.R.I., CHRONOFLEX

## 2021-09-03 RX ORDER — HYDROMORPHONE HYDROCHLORIDE 1 MG/ML
0.6 INJECTION, SOLUTION INTRAMUSCULAR; INTRAVENOUS; SUBCUTANEOUS EVERY 5 MIN PRN
Status: DISCONTINUED | OUTPATIENT
Start: 2021-09-03 | End: 2021-09-03 | Stop reason: HOSPADM

## 2021-09-03 RX ORDER — LIDOCAINE HYDROCHLORIDE 10 MG/ML
INJECTION, SOLUTION EPIDURAL; INFILTRATION; INTRACAUDAL; PERINEURAL AS NEEDED
Status: DISCONTINUED | OUTPATIENT
Start: 2021-09-03 | End: 2021-09-03 | Stop reason: SURG

## 2021-09-03 RX ORDER — BUPIVACAINE HYDROCHLORIDE 2.5 MG/ML
INJECTION, SOLUTION EPIDURAL; INFILTRATION; INTRACAUDAL AS NEEDED
Status: DISCONTINUED | OUTPATIENT
Start: 2021-09-03 | End: 2021-09-03 | Stop reason: HOSPADM

## 2021-09-03 RX ORDER — MAGNESIUM OXIDE 400 MG (241.3 MG MAGNESIUM) TABLET
400 TABLET ONCE
Status: COMPLETED | OUTPATIENT
Start: 2021-09-03 | End: 2021-09-03

## 2021-09-03 RX ORDER — NALOXONE HYDROCHLORIDE 0.4 MG/ML
80 INJECTION, SOLUTION INTRAMUSCULAR; INTRAVENOUS; SUBCUTANEOUS AS NEEDED
Status: DISCONTINUED | OUTPATIENT
Start: 2021-09-03 | End: 2021-09-03 | Stop reason: HOSPADM

## 2021-09-03 RX ORDER — MORPHINE SULFATE 4 MG/ML
4 INJECTION, SOLUTION INTRAMUSCULAR; INTRAVENOUS EVERY 10 MIN PRN
Status: DISCONTINUED | OUTPATIENT
Start: 2021-09-03 | End: 2021-09-03 | Stop reason: HOSPADM

## 2021-09-03 RX ORDER — HYDROMORPHONE HYDROCHLORIDE 1 MG/ML
0.2 INJECTION, SOLUTION INTRAMUSCULAR; INTRAVENOUS; SUBCUTANEOUS EVERY 5 MIN PRN
Status: DISCONTINUED | OUTPATIENT
Start: 2021-09-03 | End: 2021-09-03 | Stop reason: HOSPADM

## 2021-09-03 RX ORDER — EPHEDRINE SULFATE 50 MG/ML
INJECTION INTRAVENOUS AS NEEDED
Status: DISCONTINUED | OUTPATIENT
Start: 2021-09-03 | End: 2021-09-03 | Stop reason: SURG

## 2021-09-03 RX ORDER — SODIUM CHLORIDE, SODIUM LACTATE, POTASSIUM CHLORIDE, CALCIUM CHLORIDE 600; 310; 30; 20 MG/100ML; MG/100ML; MG/100ML; MG/100ML
INJECTION, SOLUTION INTRAVENOUS CONTINUOUS
Status: DISCONTINUED | OUTPATIENT
Start: 2021-09-03 | End: 2021-09-03 | Stop reason: HOSPADM

## 2021-09-03 RX ORDER — ONDANSETRON 2 MG/ML
4 INJECTION INTRAMUSCULAR; INTRAVENOUS ONCE AS NEEDED
Status: DISCONTINUED | OUTPATIENT
Start: 2021-09-03 | End: 2021-09-03 | Stop reason: HOSPADM

## 2021-09-03 RX ORDER — MORPHINE SULFATE 4 MG/ML
2 INJECTION, SOLUTION INTRAMUSCULAR; INTRAVENOUS EVERY 10 MIN PRN
Status: DISCONTINUED | OUTPATIENT
Start: 2021-09-03 | End: 2021-09-03 | Stop reason: HOSPADM

## 2021-09-03 RX ORDER — MORPHINE SULFATE 10 MG/ML
6 INJECTION, SOLUTION INTRAMUSCULAR; INTRAVENOUS EVERY 10 MIN PRN
Status: DISCONTINUED | OUTPATIENT
Start: 2021-09-03 | End: 2021-09-03 | Stop reason: HOSPADM

## 2021-09-03 RX ORDER — SODIUM CHLORIDE, SODIUM LACTATE, POTASSIUM CHLORIDE, CALCIUM CHLORIDE 600; 310; 30; 20 MG/100ML; MG/100ML; MG/100ML; MG/100ML
INJECTION, SOLUTION INTRAVENOUS CONTINUOUS PRN
Status: DISCONTINUED | OUTPATIENT
Start: 2021-09-03 | End: 2021-09-03 | Stop reason: SURG

## 2021-09-03 RX ORDER — PHENYLEPHRINE HCL 10 MG/ML
VIAL (ML) INJECTION AS NEEDED
Status: DISCONTINUED | OUTPATIENT
Start: 2021-09-03 | End: 2021-09-03 | Stop reason: SURG

## 2021-09-03 RX ORDER — DEXAMETHASONE SODIUM PHOSPHATE 4 MG/ML
VIAL (ML) INJECTION AS NEEDED
Status: DISCONTINUED | OUTPATIENT
Start: 2021-09-03 | End: 2021-09-03 | Stop reason: SURG

## 2021-09-03 RX ORDER — ONDANSETRON 2 MG/ML
INJECTION INTRAMUSCULAR; INTRAVENOUS AS NEEDED
Status: DISCONTINUED | OUTPATIENT
Start: 2021-09-03 | End: 2021-09-03 | Stop reason: SURG

## 2021-09-03 RX ORDER — HYDROMORPHONE HYDROCHLORIDE 1 MG/ML
0.4 INJECTION, SOLUTION INTRAMUSCULAR; INTRAVENOUS; SUBCUTANEOUS EVERY 5 MIN PRN
Status: DISCONTINUED | OUTPATIENT
Start: 2021-09-03 | End: 2021-09-03 | Stop reason: HOSPADM

## 2021-09-03 RX ADMIN — CEFAZOLIN SODIUM/WATER 2 G: 2 G/20 ML SYRINGE (ML) INTRAVENOUS at 08:01:00

## 2021-09-03 RX ADMIN — SODIUM CHLORIDE, SODIUM LACTATE, POTASSIUM CHLORIDE, CALCIUM CHLORIDE: 600; 310; 30; 20 INJECTION, SOLUTION INTRAVENOUS at 08:27:00

## 2021-09-03 RX ADMIN — PHENYLEPHRINE HCL 40 MCG: 10 MG/ML VIAL (ML) INJECTION at 08:19:00

## 2021-09-03 RX ADMIN — EPHEDRINE SULFATE 5 MG: 50 INJECTION INTRAVENOUS at 08:14:00

## 2021-09-03 RX ADMIN — ONDANSETRON 4 MG: 2 INJECTION INTRAMUSCULAR; INTRAVENOUS at 08:27:00

## 2021-09-03 RX ADMIN — LIDOCAINE HYDROCHLORIDE 50 MG: 10 INJECTION, SOLUTION EPIDURAL; INFILTRATION; INTRACAUDAL; PERINEURAL at 07:49:00

## 2021-09-03 RX ADMIN — EPHEDRINE SULFATE 5 MG: 50 INJECTION INTRAVENOUS at 08:03:00

## 2021-09-03 RX ADMIN — DEXAMETHASONE SODIUM PHOSPHATE 4 MG: 4 MG/ML VIAL (ML) INJECTION at 08:26:00

## 2021-09-03 RX ADMIN — SODIUM CHLORIDE, SODIUM LACTATE, POTASSIUM CHLORIDE, CALCIUM CHLORIDE: 600; 310; 30; 20 INJECTION, SOLUTION INTRAVENOUS at 07:47:00

## 2021-09-03 NOTE — PLAN OF CARE
Problem: Patient Centered Care  Goal: Patient preferences are identified and integrated in the patient's plan of care  Description: Interventions:  - What would you like us to know as we care for you?  I live at [facility] timely, complete, and any respiratory difficulty  - Respiratory Therapy support as indicated  - Manage/alleviate anxiety  - Monitor for signs/symptoms of CO2 retention  Outcome: Progressing     Problem: GASTROINTESTINAL - ADULT  Goal: Maintains or returns to baseline bowel func distraction and/or relaxation techniques  - Monitor for opioid side effects  - Notify MD/LIP if interventions unsuccessful or patient reports new pain  - Anticipate increased pain with activity and pre-medicate as appropriate  Outcome: Progressing     Prob skills  - Provide emotional support with 1:1 interaction with staff  Outcome: Progressing   Monitoring vital signs- stable at this time. No acute changes noted throughout shift. Patient does not complain of pain.   Patient is NPO past midnight for portacath

## 2021-09-03 NOTE — PROGRESS NOTES
[facility] HOSP - [facility]    Cardiology Progress Note    Olive Ac Patient Status:  Inpatient    1931 MRN [id number]   Location [facility] 5SW/SE Attending Amanda Savage MD   Hosp Day # 7 PCP Ba Toro MD       Impress or extra cardiac sounds. Lungs: Clear without wheezes, rales, rhonchi or dullness. Normal excursions and effort. Abdomen: Soft, non-tender  Extremities: Without clubbing, cyanosis or edema. Peripheral pulses are 2+.   Neurologic: Alert and oriented, nor splenomegaly. 3. Right kidney is markedly atrophic with mild pelviectasis, possibly representing sequela of a chronic ureteropelvic junction obstruction. 4. Uncomplicated colonic diverticulosis. 5. Small hiatal hernia. 6. Fibroid uterus.    7. Mild mg, 650 mg, Oral, Q6H PRN  [MAR Hold] PEG 3350 (MIRALAX) powder packet 17 g, 17 g, Oral, Daily PRN  [MAR Hold] bisacodyl (DULCOLAX) rectal suppository 10 mg, 10 mg, Rectal, Daily PRN  [MAR Hold] ondansetron (ZOFRAN) injection 4 mg, 4 mg, Intravenous, Q6H P

## 2021-09-03 NOTE — ANESTHESIA PREPROCEDURE EVALUATION
Anesthesia PreOp Note    HPI:     Mayra Woodall is a 80year old female who presents for preoperative consultation requested by: Lorelei Lance MD    Date of Surgery: 8/27/2021 - 9/3/2021    Procedure(s):  port a catheter insertion  Indication: abdomina daily., Disp: 180 tablet, Rfl: 3, 8/27/2021  Levothyroxine Sodium 50 MCG Oral Tab, Take 1.5 tablets (75 mcg total) by mouth before breakfast., Disp: 135 tablet, Rfl: 3, 8/27/2021  atorvastatin 10 MG Oral Tab, Take 1 tablet (10 mg total) by mouth daily. Rene Wall Before breakfast, Dorothea Merida MD, 75 mcg at 09/03/21 0544  [MAR Hold] traMADol (ULTRAM) tab 50 mg, 50 mg, Oral, Q12H PRN, Dorothea Merida MD    No current [facility]-ordered outpatient medications on file.         Red Dye                 SWELLING    Fam     Fear of Current or Ex-Partner:       Emotionally Abused:       Physically Abused:       Sexually Abused:     Available pre-op labs reviewed.   Lab Results   Component Value Date    WBC 4.9 08/31/2021    WBC 3.85 (L) 08/23/2021    RBC 3.13 (L) 08/31/20 GI/Hepatic/Renal      Endo/Other    (+) hypothyroidism,   Abdominal                Anesthesia Plan:   ASA:  3  Plan:   General  Post-op Pain Management: IV analgesics  Plan Comments: I have discussed the anesthetic plan, major risks and alternatives with t

## 2021-09-03 NOTE — ANESTHESIA PROCEDURE NOTES
Airway  Date/Time: 9/3/2021 8:00 AM  Urgency: elective    Airway not difficult    General Information and Staff    Patient location during procedure: OR  Anesthesiologist: Harry Abdi DO  Resident/CRNA: Nuvia Adamson CRNA  Performed: anesthesiolog

## 2021-09-03 NOTE — ANESTHESIA POSTPROCEDURE EVALUATION
Patient: Katina Castañeda    Procedure Summary     Date: 09/03/21 Room / Location: [address] [place] [address] MAIN OR 04 / [facility] MAIN OR    Anesthesia Start: 1915 Anesthesia Stop:     Procedure: port a catheter insertion (N/A Chest) Diagnosis: (abdominal tumor)    Surgeons: Margi Avila,

## 2021-09-03 NOTE — DISCHARGE SUMMARY
General Medicine Discharge Summary     Patient ID:  Kamla Ospina  80year old  11/29/1931    Admit date: 8/27/2021    Discharge date and time: 09/03/21    Attending Physician: Dorothea Merida MD     Primary Care Physician: Curt Escalera MD able to advance to mid sigmoid colon due to adhesions  - oncology consulted, CT chest completed, will plan for outpatient chemotherapy, will need port placement  - repeat US paracentesis without drainable amount, high chance of recurrent ascites due to mal of discharge. These medication changes have been made as below         Medication List      START taking these medications    metoprolol tartrate 25 MG Tabs  Commonly known as: LOPRESSOR  Take 1 tablet (25 mg total) by mouth 2x Daily(Beta Blocker). Milton Allen MD  [place] Hospitalist

## 2021-09-03 NOTE — PROGRESS NOTES
NEPHROLOGY DAILY PROGRESS NOTE     Follow up Reason: JULIENNE     SUBJECTIVE:    S/p port placement.     OBJECTIVE:    Total Intake/Output:    Intake/Output Summary (Last 24 hours) at 9/3/2021 1256  Last data filed at 9/3/2021 0827  Gross per 24 hour   Intake 16 08/31/2021       IMAGING:  XR CHEST AP PORTABLE  (CPT=71045)    Result Date: 9/3/2021  CONCLUSION:  1. Status post catheter insertion without complication into the superior vena cava. 2. The rest of the findings are stable from August 27, 2021.     Dictated

## 2021-09-03 NOTE — BRIEF OP NOTE
Pre-Operative Diagnosis: abdominal tumor     Post-Operative Diagnosis: abdominal tumor      Procedure Performed:   port a catheter insertion    Surgeon(s) and Role:     * Carrie Ramirez MD - Primary        Specimen: none     Estimated Blood Loss:  5 ml

## 2021-09-04 NOTE — PROGRESS NOTES
Patient is a/ox4. Sy Andino is at bedside. All belongings at her side. Patient taken down by wheelchair. Appeared happy to go home, after being upset that Sy Andino was taking a while to come.

## 2021-09-06 NOTE — OPERATIVE REPORT
[facility]    PATIENT'S NAME: Deon Anthony   ATTENDING PHYSICIAN: Dara Allen MD   OPERATING PHYSICIAN: Aba Cintron.  Sonny Willingham MD   PATIENT ACCOUNT#:   [id number]    LOCATION:  [facility] [address] PACU 4 [facility] 10  MEDICAL RECORD #:   [id number]       DA skin of the neck was closed 4-0 Vicryl and Dermabond. The subcutaneous tissue around the port was closed and then the skin closed with 4-0 Vicryl and Dermabond. Patient tolerated the procedure well, went to recovery room in good condition.      Dictated

## 2021-09-20 PROBLEM — D50.9 IRON DEFICIENCY ANEMIA: Status: ACTIVE | Noted: 2021-09-20

## 2021-09-29 ENCOUNTER — APPOINTMENT (OUTPATIENT)
Dept: GENERAL RADIOLOGY | Facility: HOSPITAL | Age: 86
DRG: 291 | End: 2021-09-29
Attending: EMERGENCY MEDICINE
Payer: MEDICARE

## 2021-09-29 ENCOUNTER — HOSPITAL ENCOUNTER (INPATIENT)
Facility: HOSPITAL | Age: 86
LOS: 2 days | Discharge: NURSING, REHAB OR LONG-TERM CARE | DRG: 291 | End: 2021-10-01
Attending: EMERGENCY MEDICINE | Admitting: STUDENT IN AN ORGANIZED HEALTH CARE EDUCATION/TRAINING PROGRAM
Payer: MEDICARE

## 2021-09-29 DIAGNOSIS — I50.9 ACUTE ON CHRONIC CONGESTIVE HEART FAILURE, UNSPECIFIED HEART FAILURE TYPE (HCC): ICD-10-CM

## 2021-09-29 DIAGNOSIS — D64.9 ANEMIA, UNSPECIFIED TYPE: Primary | ICD-10-CM

## 2021-09-29 DIAGNOSIS — C56.9 MALIGNANT NEOPLASM OF OVARY, UNSPECIFIED LATERALITY (HCC): ICD-10-CM

## 2021-09-29 PROBLEM — E87.6 HYPOKALEMIA: Status: ACTIVE | Noted: 2021-09-29

## 2021-09-29 PROBLEM — E87.1 HYPONATREMIA: Status: ACTIVE | Noted: 2021-09-29

## 2021-09-29 PROBLEM — R79.89 AZOTEMIA: Status: ACTIVE | Noted: 2021-09-29

## 2021-09-29 PROBLEM — D69.6 THROMBOCYTOPENIA (HCC): Status: ACTIVE | Noted: 2021-09-29

## 2021-09-29 PROBLEM — D72.829 LEUKOCYTOSIS: Status: ACTIVE | Noted: 2021-09-29

## 2021-09-29 PROCEDURE — 80069 RENAL FUNCTION PANEL: CPT | Performed by: EMERGENCY MEDICINE

## 2021-09-29 PROCEDURE — 93005 ELECTROCARDIOGRAM TRACING: CPT

## 2021-09-29 PROCEDURE — 99285 EMERGENCY DEPT VISIT HI MDM: CPT

## 2021-09-29 PROCEDURE — 30233N1 TRANSFUSION OF NONAUTOLOGOUS RED BLOOD CELLS INTO PERIPHERAL VEIN, PERCUTANEOUS APPROACH: ICD-10-PCS | Performed by: STUDENT IN AN ORGANIZED HEALTH CARE EDUCATION/TRAINING PROGRAM

## 2021-09-29 PROCEDURE — 85018 HEMOGLOBIN: CPT | Performed by: STUDENT IN AN ORGANIZED HEALTH CARE EDUCATION/TRAINING PROGRAM

## 2021-09-29 PROCEDURE — 83880 ASSAY OF NATRIURETIC PEPTIDE: CPT | Performed by: EMERGENCY MEDICINE

## 2021-09-29 PROCEDURE — 84484 ASSAY OF TROPONIN QUANT: CPT | Performed by: EMERGENCY MEDICINE

## 2021-09-29 PROCEDURE — 86900 BLOOD TYPING SEROLOGIC ABO: CPT | Performed by: EMERGENCY MEDICINE

## 2021-09-29 PROCEDURE — 81001 URINALYSIS AUTO W/SCOPE: CPT | Performed by: EMERGENCY MEDICINE

## 2021-09-29 PROCEDURE — 96376 TX/PRO/DX INJ SAME DRUG ADON: CPT

## 2021-09-29 PROCEDURE — 86920 COMPATIBILITY TEST SPIN: CPT

## 2021-09-29 PROCEDURE — 85060 BLOOD SMEAR INTERPRETATION: CPT | Performed by: EMERGENCY MEDICINE

## 2021-09-29 PROCEDURE — 71045 X-RAY EXAM CHEST 1 VIEW: CPT | Performed by: EMERGENCY MEDICINE

## 2021-09-29 PROCEDURE — 87086 URINE CULTURE/COLONY COUNT: CPT | Performed by: EMERGENCY MEDICINE

## 2021-09-29 PROCEDURE — 85027 COMPLETE CBC AUTOMATED: CPT | Performed by: EMERGENCY MEDICINE

## 2021-09-29 PROCEDURE — 85014 HEMATOCRIT: CPT | Performed by: STUDENT IN AN ORGANIZED HEALTH CARE EDUCATION/TRAINING PROGRAM

## 2021-09-29 PROCEDURE — 80048 BASIC METABOLIC PNL TOTAL CA: CPT | Performed by: EMERGENCY MEDICINE

## 2021-09-29 PROCEDURE — 93010 ELECTROCARDIOGRAM REPORT: CPT | Performed by: EMERGENCY MEDICINE

## 2021-09-29 PROCEDURE — 86901 BLOOD TYPING SEROLOGIC RH(D): CPT | Performed by: EMERGENCY MEDICINE

## 2021-09-29 PROCEDURE — 85025 COMPLETE CBC W/AUTO DIFF WBC: CPT | Performed by: EMERGENCY MEDICINE

## 2021-09-29 PROCEDURE — 36430 TRANSFUSION BLD/BLD COMPNT: CPT

## 2021-09-29 PROCEDURE — 96374 THER/PROPH/DIAG INJ IV PUSH: CPT

## 2021-09-29 PROCEDURE — 86850 RBC ANTIBODY SCREEN: CPT | Performed by: EMERGENCY MEDICINE

## 2021-09-29 PROCEDURE — 85007 BL SMEAR W/DIFF WBC COUNT: CPT | Performed by: EMERGENCY MEDICINE

## 2021-09-29 RX ORDER — BISACODYL 10 MG
10 SUPPOSITORY, RECTAL RECTAL
Status: DISCONTINUED | OUTPATIENT
Start: 2021-09-29 | End: 2021-10-01

## 2021-09-29 RX ORDER — ACETAMINOPHEN 325 MG/1
650 TABLET ORAL EVERY 6 HOURS PRN
Status: DISCONTINUED | OUTPATIENT
Start: 2021-09-29 | End: 2021-10-01

## 2021-09-29 RX ORDER — HYDROCODONE BITARTRATE AND ACETAMINOPHEN 5; 325 MG/1; MG/1
2 TABLET ORAL EVERY 4 HOURS PRN
Status: DISCONTINUED | OUTPATIENT
Start: 2021-09-29 | End: 2021-10-01

## 2021-09-29 RX ORDER — ACETAMINOPHEN 325 MG/1
650 TABLET ORAL EVERY 4 HOURS PRN
Status: DISCONTINUED | OUTPATIENT
Start: 2021-09-29 | End: 2021-10-01

## 2021-09-29 RX ORDER — LEVOTHYROXINE SODIUM 0.07 MG/1
75 TABLET ORAL
Status: DISCONTINUED | OUTPATIENT
Start: 2021-09-30 | End: 2021-10-01

## 2021-09-29 RX ORDER — ATORVASTATIN CALCIUM 10 MG/1
10 TABLET, FILM COATED ORAL DAILY
Status: DISCONTINUED | OUTPATIENT
Start: 2021-09-29 | End: 2021-09-30

## 2021-09-29 RX ORDER — FUROSEMIDE 10 MG/ML
40 INJECTION INTRAMUSCULAR; INTRAVENOUS DAILY
Status: DISCONTINUED | OUTPATIENT
Start: 2021-09-29 | End: 2021-10-01

## 2021-09-29 RX ORDER — METOCLOPRAMIDE HYDROCHLORIDE 5 MG/ML
5 INJECTION INTRAMUSCULAR; INTRAVENOUS EVERY 8 HOURS PRN
Status: DISCONTINUED | OUTPATIENT
Start: 2021-09-29 | End: 2021-10-01

## 2021-09-29 RX ORDER — HEPARIN SODIUM 5000 [USP'U]/ML
5000 INJECTION, SOLUTION INTRAVENOUS; SUBCUTANEOUS EVERY 8 HOURS SCHEDULED
Status: DISCONTINUED | OUTPATIENT
Start: 2021-09-29 | End: 2021-10-01

## 2021-09-29 RX ORDER — ONDANSETRON 2 MG/ML
4 INJECTION INTRAMUSCULAR; INTRAVENOUS EVERY 6 HOURS PRN
Status: DISCONTINUED | OUTPATIENT
Start: 2021-09-29 | End: 2021-10-01

## 2021-09-29 RX ORDER — HYDROCODONE BITARTRATE AND ACETAMINOPHEN 5; 325 MG/1; MG/1
1 TABLET ORAL EVERY 4 HOURS PRN
Status: DISCONTINUED | OUTPATIENT
Start: 2021-09-29 | End: 2021-10-01

## 2021-09-29 RX ORDER — FUROSEMIDE 10 MG/ML
40 INJECTION INTRAMUSCULAR; INTRAVENOUS ONCE
Status: COMPLETED | OUTPATIENT
Start: 2021-09-29 | End: 2021-09-29

## 2021-09-29 RX ORDER — POLYETHYLENE GLYCOL 3350 17 G/17G
17 POWDER, FOR SOLUTION ORAL DAILY PRN
Status: DISCONTINUED | OUTPATIENT
Start: 2021-09-29 | End: 2021-10-01

## 2021-09-29 RX ORDER — POTASSIUM CHLORIDE 20 MEQ/1
40 TABLET, EXTENDED RELEASE ORAL EVERY 4 HOURS
Status: COMPLETED | OUTPATIENT
Start: 2021-09-29 | End: 2021-09-30

## 2021-09-29 NOTE — ED QUICK NOTES
Orders for admission, patient is aware of plan and ready to go upstairs. Any questions, please call ED RN Deepti Carney  at [address].    Type of COVID test sent:  COVID Suspicion level: Low    Titratable drug(s) infusing: none  Rate: n/a    LOC at time of t

## 2021-09-29 NOTE — H&P
[facility] Hospitalist H&P       CC: Patient presents with:  Swelling Edema       PCP: Shiloh Mary MD    History of Present Illness:     Patient is a 80 year old female with PMH sig for A-fib, CKD stage 3 with baseline Cr around 1.5 and atrophic right kidney Smoking status: Never Smoker      Smokeless tobacco: Never Used    Alcohol use: Not Currently      Alcohol/week: 1.0 standard drink      Types: 1 Glasses of wine per week       Fam Hx  Family History   Problem Relation Age of Onset   • Other (CHF) Sister Pulmonary venous distention with perihilar and lower lobe pulmonary congestion has developed. 3. Superimposed right basilar atelectasis/scarring.     Dictated by (CST): Mary Sylvester MD on 9/29/2021 at 1:10 PM     Finalized by (CST): Elías Ricci consult    Leukocytosis  Thrombocytopenia  –Possibly chemo related? No systemic symptoms at this time  –Will hold antibiotics at this time.       FN:  - IVF: None  - Diet: Cardiac    DVT Prophy: Heparin subcu  Atrophy: PT/OT  Lines: PIV– will need KITTY    D

## 2021-09-29 NOTE — ED INITIAL ASSESSMENT (HPI)
Patient presents to ER with complaints of bilateral leg swelling and difficulty breathing for over 2 weeks. Denies chest pain, denies Hx of CHF.

## 2021-09-29 NOTE — CONSULTS
[facility] HOSP - [facility]    Report of Consultation    Myah Smith Patient Status:  Emergency    1931 MRN [id number]   Location [address] Attending Gunjan Zhao MD   Hosp Day # 0 PCP MD ZAN Graham Glasses of wine per week    Drug use: Never          Current Medications:  furosemide (LASIX) injection 40 mg, 40 mg, Intravenous, Daily      (Not in a hospital admission)      Allergies    Red Dye                 SWELLING    Review of Systems:   Pertinent Hypothyroidism, peritoneal carcinomatosis, presents with significant progressive SOB and worsened LE edema:    CKD stage 3:  - Creatinine below baseline at this time likely from volume overload. - Avoid nephrotoxins   - renally adjust medications.     Hypo

## 2021-09-29 NOTE — CONSULTS
Cardiology Consultation   [facility] Patient Status:  Emergency    1931 MRN [id number]   Location [address] Attending Donnelly Saint, MD   Hosp Day # 0 PCP Lima Munson MD     Reason f tartrate 25 MG Oral Tab, Take 1 tablet (25 mg total) by mouth 2x Daily(Beta Blocker). , Disp: 60 tablet, Rfl: 0  apixaban 2.5 MG Oral Tab, Take 1 tablet (2.5 mg total) by mouth 2 (two) times daily. , Disp: 180 tablet, Rfl: 3  atorvastatin 10 MG Oral Tab, Araceli Cancer kg)      General: awake, alert, oriented x 3, no acute distress  HEENT: at/nc, perrl, eomi  Neck: No JVD, carotids 2+ no bruits.   Cardiac: Irregularly irregular  Lungs: Diminished bilat bases  Abdomen: Soft, non-tender, non-distended, normal bowel sounds (H) 05/21/2021    HDL 72 11/01/2019    HDL 70 09/10/2018     Lab Results   Component Value Date    TRIG 84 05/21/2021    TRIG 117.00 11/01/2019    TRIG 85 09/10/2018     Lab Results   Component Value Date    LDL 62 05/21/2021    LDL 76 11/01/2019    LDL 82

## 2021-09-29 NOTE — ED PROVIDER NOTES
Patient Seen in: [facility] AND [facility] Emergency Department      History   Patient presents with:  Swelling Edema    Stated Complaint: sob    Subjective:   HPI    Patient presents to the emergency department complaining of lower extremity edema and shortness distress. Appearance: She is well-developed. HENT:      Head: Normocephalic.       Nose: Nose normal.      Mouth/Throat:      Mouth: Mucous membranes are moist.   Eyes:      Conjunctiva/sclera: Conjunctivae normal.      Pupils: Pupils are equal, round other components within normal limits   CBC W/ DIFFERENTIAL - Abnormal; Notable for the following components:    WBC 15.4 (*)     RBC 2.36 (*)     HGB 6.8 (*)     HCT 21.2 (*)     .0 (*)     Neutrophil Absolute Prelim 12.74 (*)     All other compone lobe pulmonary congestion has developed. 3. Superimposed right basilar atelectasis/scarring.     Dictated by (CST): Romina Guaman MD on 9/29/2021 at 1:10 PM     Finalized by (CST): Romina Guaman MD on 9/29/2021 at 1:12 PM            Radiology ex

## 2021-09-30 ENCOUNTER — APPOINTMENT (OUTPATIENT)
Dept: CV DIAGNOSTICS | Facility: HOSPITAL | Age: 86
DRG: 291 | End: 2021-09-30
Attending: INTERNAL MEDICINE
Payer: MEDICARE

## 2021-09-30 ENCOUNTER — APPOINTMENT (OUTPATIENT)
Dept: ULTRASOUND IMAGING | Facility: HOSPITAL | Age: 86
DRG: 291 | End: 2021-09-30
Attending: STUDENT IN AN ORGANIZED HEALTH CARE EDUCATION/TRAINING PROGRAM
Payer: MEDICARE

## 2021-09-30 PROCEDURE — 93306 TTE W/DOPPLER COMPLETE: CPT | Performed by: INTERNAL MEDICINE

## 2021-09-30 PROCEDURE — 97116 GAIT TRAINING THERAPY: CPT

## 2021-09-30 PROCEDURE — 82607 VITAMIN B-12: CPT | Performed by: INTERNAL MEDICINE

## 2021-09-30 PROCEDURE — 83540 ASSAY OF IRON: CPT | Performed by: INTERNAL MEDICINE

## 2021-09-30 PROCEDURE — 97162 PT EVAL MOD COMPLEX 30 MIN: CPT

## 2021-09-30 PROCEDURE — 83735 ASSAY OF MAGNESIUM: CPT | Performed by: INTERNAL MEDICINE

## 2021-09-30 PROCEDURE — 82728 ASSAY OF FERRITIN: CPT | Performed by: INTERNAL MEDICINE

## 2021-09-30 PROCEDURE — 85025 COMPLETE CBC W/AUTO DIFF WBC: CPT | Performed by: STUDENT IN AN ORGANIZED HEALTH CARE EDUCATION/TRAINING PROGRAM

## 2021-09-30 PROCEDURE — 80069 RENAL FUNCTION PANEL: CPT | Performed by: INTERNAL MEDICINE

## 2021-09-30 PROCEDURE — 84466 ASSAY OF TRANSFERRIN: CPT | Performed by: INTERNAL MEDICINE

## 2021-09-30 PROCEDURE — 87493 C DIFF AMPLIFIED PROBE: CPT | Performed by: INTERNAL MEDICINE

## 2021-09-30 PROCEDURE — 76705 ECHO EXAM OF ABDOMEN: CPT | Performed by: STUDENT IN AN ORGANIZED HEALTH CARE EDUCATION/TRAINING PROGRAM

## 2021-09-30 PROCEDURE — 84132 ASSAY OF SERUM POTASSIUM: CPT | Performed by: STUDENT IN AN ORGANIZED HEALTH CARE EDUCATION/TRAINING PROGRAM

## 2021-09-30 RX ORDER — MAGNESIUM OXIDE 400 MG (241.3 MG MAGNESIUM) TABLET
800 TABLET ONCE
Status: COMPLETED | OUTPATIENT
Start: 2021-09-30 | End: 2021-09-30

## 2021-09-30 RX ORDER — ATORVASTATIN CALCIUM 10 MG/1
10 TABLET, FILM COATED ORAL NIGHTLY
Status: DISCONTINUED | OUTPATIENT
Start: 2021-09-30 | End: 2021-10-01

## 2021-09-30 NOTE — PROGRESS NOTES
[facility]    Progress Note    Katina Castañeda Patient Status:  Inpatient    1931 MRN [id number]   Location [facility] 4W/SW/SE Attending Edgardo Santacruz, [address] [facility] Day # 1 PCP Carola Walton MD       Subjective:     Mario Alexis -Right bundle branch block. -Inferior infarct -probably not recent.  ABNORMAL When compared with ECG of 08/27/2021 15:11:39 No significant changes have occurred Electronically signed on 09/29/2021 at 15:51 by MD Clay Lazcano,

## 2021-09-30 NOTE — PROGRESS NOTES
09/30/21 0700   Clinical Encounter Type   Visited With Patient   Routine Visit Introduction   Referral From Nurse   Referral To    Christian Encounters   Spiritual Requests During Visit / Hospitalization Communion; Sacrament of the [address] [place] [address]

## 2021-09-30 NOTE — CM/SW NOTE
09/30/21 1100   CM/SW Referral Data   Referral Source Social Work (self-referral)   Reason for Referral Discharge planning; Readmission   Informant Patient   Readmission Assessment   Factors that patient feels contributed to this readmission Acute/Chron Post-Acute Provider   Patient declines recommended services Yes  (The pt. is declining HHC and KITTY )     The pt. Is planning to return home to her independent living at [facility]. The pt. Is refusing HHC and KITTY at this time.   She stated she will make ar

## 2021-09-30 NOTE — OCCUPATIONAL THERAPY NOTE
3 attempts to made to see pt for OT evaluation. Pt off floor for first attempt floor, then pt refused in am and again in pm. Nurse aware. Plan to re-attempt tomorrow.

## 2021-09-30 NOTE — CM/SW NOTE
10/1 319pm: The pt. Is going to discharge to [facility] today. The pt. Does not want a medicare and would prefer her friend to pick her up. The pt's friend will  today 10/1 at 615p. The pt's nurse is aware and agreeable.       Report [phone]

## 2021-09-30 NOTE — PROGRESS NOTES
[facility] Hospitalist Progress Note     CC: Hospital Follow up    PCP: Parviz Lovett MD       Assessment/Plan:     Principal Problem:    Anemia, unspecified type  Active Problems:    Hyponatremia    Hypokalemia    Thrombocytopenia (HCC)    Leukocytosis    Azote Eliquis  –Cardiology on consult     Leukocytosis  Thrombocytopenia  –Likely chemo related, No systemic symptoms at this time  –Will hold antibiotics at this time.     –Continue to monitor  –Discussed with hematology/oncology     FN:  - IVF: None  - Diet: Ca and affect      Data Review:       Labs:     Recent Labs   Lab 09/29/21  1227 09/29/21  1953 09/30/21  0608   RBC 2.36*  --  3.00*   HGB 6.8* 8.7* 8.5*   HCT 21.2* 25.7* 26.3*   MCV 89.8  --  87.7   MCH 28.8  --  28.3   MCHC 32.1  --  32.3   RDW 14.2  --

## 2021-09-30 NOTE — PROGRESS NOTES
[facility]  Progress Note    Suhas Gandhi Patient Status:  Inpatient    1931 MRN [id number]   Location [facility] 4W/SW/SE Attending Robert Tate, [address] [facility] Day # 1 PCP Dre Dumont MD     Impression:  Radha Montez Subcutaneous, Q8H [address]  acetaminophen (TYLENOL) tab 650 mg, 650 mg, Oral, Q6H PRN  acetaminophen (TYLENOL) tab 650 mg, 650 mg, Oral, Q4H PRN   Or  HYDROcodone-acetaminophen (NORCO) 5-325 MG per tab 1 tablet, 1 tablet, Oral, Q4H PRN   Or  HYDROcodone-acetami PORTABLE  (CPT=71045)    Result Date: 9/29/2021  CONCLUSION:  1. Cardiomegaly with prominent central vasculature. 2. Pulmonary venous distention with perihilar and lower lobe pulmonary congestion has developed.  3. Superimposed right basilar atelectasis/sca

## 2021-09-30 NOTE — PLAN OF CARE
Patient admitted from ED. Received one unit of blood. Denies shortness of breath. Purewick in place voiding freely. Call light in reach. Frequent rounding performed.      Problem: Patient Centered Care  Goal: Patient preferences are identified and integrate INTERVENTIONS:  - Monitor vital signs, rhythm, and trends  - Monitor for bleeding, hypotension and signs of decreased cardiac output  - Evaluate effectiveness of vasoactive medications to optimize hemodynamic stability  - Monitor arterial and/or venous pun

## 2021-09-30 NOTE — PLAN OF CARE
Osei Tyler stated that her breathing has improved. IV lasix given, voiding freely via [address]. Up with walker and standby assist, refusing rehab.    Problem: Patient Centered Care  Goal: Patient preferences are identified and integrated in the patient's plan ADULT  Goal: Maintains optimal cardiac output and hemodynamic stability  Description: INTERVENTIONS:  - Monitor vital signs, rhythm, and trends  - Monitor for bleeding, hypotension and signs of decreased cardiac output  - Evaluate effectiveness of vasoacti symptoms of electrolyte imbalances  - Administer electrolyte replacement as ordered  - Monitor response to electrolyte replacements, including rhythm and repeat lab results as appropriate  - Fluid restriction as ordered  - Instruct patient on fluid and nut

## 2021-09-30 NOTE — CONSULTS
Hematology/Oncology Consult Note        NAME: Heidi Whitley - ROOM: [address] [place] [address] - MRN: [id number] - Age: 80year old - : 1931    Reason for Consult:  Ovarian Cancer, shortness of breath    Patient is a 80 y.o female well known to me for the recen conducted and otherwise negative than HPI      Physical Exam:  /84 (BP Location: Right arm)   Pulse 78   Temp 98.4 °F (36.9 °C) (Oral)   Resp 18   Ht 1.549 m (5' 1\")   Wt 61.5 kg (135 lb 9.6 oz)   SpO2 97%   BMI 25.62 kg/m²   Physical Exam:   Koby Nicole

## 2021-09-30 NOTE — PLAN OF CARE
No acute changes overnight. Hgb 8.7 s/p 1U PRBCs. Patient stating she feels like her breathing has improved. No complaints of SOB. Vital signs stable. Remote tele, no calls overnight. Purewick in place, voiding freely. Kept NPO after midnight for abd US. devices as appropriate  - Consider OT/PT consult to assist with strengthening/mobility  - Encourage toileting schedule  Outcome: Progressing     Problem: CARDIOVASCULAR - ADULT  Goal: Maintains optimal cardiac output and hemodynamic stability  Description: METABOLIC/FLUID AND ELECTROLYTES - ADULT  Goal: Electrolytes maintained within normal limits  Description: INTERVENTIONS:  - Monitor labs and rhythm and assess patient for signs and symptoms of electrolyte imbalances  - Administer electrolyte replacement a and limitations with patient/family  Outcome: Progressing

## 2021-09-30 NOTE — PLAN OF CARE
Patient is alert and oriented. Denies pain. Voids freely using purewick. Tele, no calls. Hep for DVT prophylaxis. Up with one assist and walker. Plan is to go to rehab. Safety precautions in place.     Problem: Patient Centered Care  Goal: Patient preferenc schedule  Outcome: Progressing     Problem: CARDIOVASCULAR - ADULT  Goal: Maintains optimal cardiac output and hemodynamic stability  Description: INTERVENTIONS:  - Monitor vital signs, rhythm, and trends  - Monitor for bleeding, hypotension and signs of d Monitor labs and rhythm and assess patient for signs and symptoms of electrolyte imbalances  - Administer electrolyte replacement as ordered  - Monitor response to electrolyte replacements, including rhythm and repeat lab results as appropriate  - Fluid re

## 2021-09-30 NOTE — PHYSICAL THERAPY NOTE
PHYSICAL THERAPY EVALUATION - INPATIENT     Room Number: 439/439-A  Evaluation Date: 9/30/2021  Type of Evaluation: Initial   Physician Order: PT Eval and Treat    Presenting Problem: anemia, LE swelling, increased SOB, CHF, CKD, Peritoneal carcinomatosis functional deficits include decreased strength, gait, activity tolerance, transfers, which are below the patient's pre-admission status.      The patient's Approx Degree of Impairment: 50.57% has been calculated based on documentation in the [facility] '6 clicks family around. In the ER, patient was noted to have stable vitals however labs concerning for anemia with imaging consistent with fluid overload.   Patient admitted for further work-up and management.   \"     Problem List  Principal Problem:    Anemia, un need for assistance  · Awareness of Deficits:  decreased awareness of deficits      BALANCE  Static Sitting: Fair -  Dynamic Sitting: Fair -  Static Standing: Fair -  Dynamic Standing: Poor +      ACTIVITY TOLERANCE  Pulse: (!) 142 (with activity)  Heart R Current Status    Goal #2 Patient is able to demonstrate transfers EOB to/from Chair/Wheelchair at assistance level: modified independent with walker - rolling     Goal #2  Current Status    Goal #3 Patient is able to ambulate 300 feet with assist device

## 2021-10-01 VITALS
HEIGHT: 61 IN | OXYGEN SATURATION: 99 % | BODY MASS INDEX: 26.13 KG/M2 | WEIGHT: 138.38 LBS | DIASTOLIC BLOOD PRESSURE: 91 MMHG | RESPIRATION RATE: 18 BRPM | TEMPERATURE: 98 F | SYSTOLIC BLOOD PRESSURE: 131 MMHG | HEART RATE: 121 BPM

## 2021-10-01 PROCEDURE — 83735 ASSAY OF MAGNESIUM: CPT | Performed by: STUDENT IN AN ORGANIZED HEALTH CARE EDUCATION/TRAINING PROGRAM

## 2021-10-01 PROCEDURE — 85025 COMPLETE CBC W/AUTO DIFF WBC: CPT | Performed by: STUDENT IN AN ORGANIZED HEALTH CARE EDUCATION/TRAINING PROGRAM

## 2021-10-01 PROCEDURE — 80069 RENAL FUNCTION PANEL: CPT | Performed by: INTERNAL MEDICINE

## 2021-10-01 PROCEDURE — 97116 GAIT TRAINING THERAPY: CPT

## 2021-10-01 PROCEDURE — 97110 THERAPEUTIC EXERCISES: CPT

## 2021-10-01 RX ORDER — FUROSEMIDE 10 MG/ML
40 INJECTION INTRAMUSCULAR; INTRAVENOUS
Status: DISCONTINUED | OUTPATIENT
Start: 2021-10-01 | End: 2021-10-01

## 2021-10-01 RX ORDER — MAGNESIUM OXIDE 400 MG (241.3 MG MAGNESIUM) TABLET
800 TABLET ONCE
Status: COMPLETED | OUTPATIENT
Start: 2021-10-01 | End: 2021-10-01

## 2021-10-01 RX ORDER — FUROSEMIDE 10 MG/ML
40 INJECTION INTRAMUSCULAR; INTRAVENOUS DAILY
Status: DISCONTINUED | OUTPATIENT
Start: 2021-10-02 | End: 2021-10-01

## 2021-10-01 RX ORDER — FUROSEMIDE 20 MG/1
20 TABLET ORAL DAILY
Refills: 0 | Status: ON HOLD | COMMUNITY
Start: 2021-10-01 | End: 2021-11-29

## 2021-10-01 NOTE — OCCUPATIONAL THERAPY NOTE
Attempt made for occupational therapy evaluation --RN reported that patient had just gotten back to bed. Upon arrival, pt was supine in bed. Pt reported she had just gotten back to bed after being up in the chair since 9 AM and was deferring activity.  Will

## 2021-10-01 NOTE — CM/SW NOTE
BPCI-Advanced Medicare Program Note:  Plan of care reviewed for care coordination and discharge planning. Noted patient falls under  BPCI/Medicare program, with  for CHF. PASTORA tool was used to help determine next care setting.  Thus, [address] recommend

## 2021-10-01 NOTE — PHYSICAL THERAPY NOTE
PHYSICAL THERAPY TREATMENT NOTE - INPATIENT     Room Number: 439/439-A       Presenting Problem: anemia, LE swelling, increased SOB, CHF, CKD, Peritoneal carcinomatosis    Problem List  Principal Problem:    Anemia, unspecified type  Active Problems:    Hy rehabilitation     PLAN  PT Treatment Plan: Bed mobility; Body mechanics; Endurance; Energy conservation; Patient education;  Family education; Gait training; Neuromuscular re-educate; Range of motion; Strengthening; Transfer training; Balance training    S Extremity Alternating marching  Ankle pumps  Knee extension     Position Sitting       Patient End of Session: Up in chair; Needs met; Call light within reach; RN aware of session/findings; All patient questions and concerns addressed;  Alarm set    CURRENT

## 2021-10-01 NOTE — PROGRESS NOTES
[facility]  Progress Note    Myah Smith Patient Status:  Inpatient    1931 MRN [id number]   Location [facility] 4W/SW/SE Attending Rudolph Burch, [address] [facility] Day # 2 PCP Camila Siddiqi MD     Impression:  Lolis Ceja Subcutaneous, Q8H [address]  acetaminophen (TYLENOL) tab 650 mg, 650 mg, Oral, Q6H PRN  acetaminophen (TYLENOL) tab 650 mg, 650 mg, Oral, Q4H PRN   Or  HYDROcodone-acetaminophen (NORCO) 5-325 MG per tab 1 tablet, 1 tablet, Oral, Q4H PRN   Or  HYDROcodone-acetami venous pressure (est): 8mm Hg. 6. Tricuspid valve: Mild-moderate regurgitation. 7. Since echocardiogram 5/21/21, tricuspid regurgitation and mitral      regurgitation are slightly worse.          Labs:  Recent Labs   Lab 09/29/21  2102 09/30/21  7445 10 Lab Results   Component Value Date    ALT 7 09/17/2021    ALT 6 09/14/2021    ALT 12 (L) 08/30/2021              Reno Rodriguez MD

## 2021-10-01 NOTE — PROGRESS NOTES
[facility]    Progress Note    Kylee Whiting Patient Status:  Inpatient    1931 MRN [id number]   Location [facility] 4W/SW/SE Attending Suzie Cornejo, [address] [facility] Day # 2 PCP Greg Patel MD       Subjective:     Karol Schilling Dictated by (CST): Natalie Bennett MD on 9/30/2021 at 12:42 PM     Finalized by (CST): Natalie Bennett MD on 9/30/2021 at 12:43 PM                        Alyx Last MD  9/30/2021    Assessment and Plan:   80year old female with PMH of Afib, HTN, Hypoth

## 2021-10-02 NOTE — DISCHARGE SUMMARY
General Medicine Discharge Summary     Patient ID:  Richar Rios  80year old  11/29/1931    Admit date: 9/29/2021    Discharge date and time: 10/1/2021  6:39 PM     Attending Physician: Yara Dhaliwal DO    Consults: IP CONSULT TO [address] [facility] around. In the ER, patient was noted to have stable vitals however labs concerning for anemia with imaging consistent with fluid overload. Patient admitted for further work-up and management.     Hospital Course:   Patient is a 80 year old female with PMH 8/30/21 (cell count, albumin, protein, culture and cytology), cytology with +malignant cells,   - EGD/colonoscopy 8/31 without GI malignancy, colonoscopy only able to advance to mid sigmoid colon due to [facility]  -Oncology on consult, appreciate recommen metoprolol tartrate 25 MG Tabs  Commonly known as: LOPRESSOR  Take 1 tablet (25 mg total) by mouth 2x Daily(Beta Blocker).      ondansetron 8 MG tablet  Commonly known as: ZOFRAN  Take 1 tablet (8 mg total) by mouth every 8 (eight) hours as needed for [employer]

## 2021-10-04 ENCOUNTER — SNF VISIT (OUTPATIENT)
Dept: INTERNAL MEDICINE CLINIC | Facility: SKILLED NURSING FACILITY | Age: 86
End: 2021-10-04

## 2021-10-04 DIAGNOSIS — I50.9 ACUTE ON CHRONIC CONGESTIVE HEART FAILURE, UNSPECIFIED HEART FAILURE TYPE (HCC): ICD-10-CM

## 2021-10-04 DIAGNOSIS — C78.6 PERITONEAL CARCINOMATOSIS (HCC): ICD-10-CM

## 2021-10-04 DIAGNOSIS — D64.9 ANEMIA, UNSPECIFIED TYPE: ICD-10-CM

## 2021-10-04 PROCEDURE — 1123F ACP DISCUSS/DSCN MKR DOCD: CPT | Performed by: NURSE PRACTITIONER

## 2021-10-04 PROCEDURE — 99310 SBSQ NF CARE HIGH MDM 45: CPT | Performed by: NURSE PRACTITIONER

## 2021-10-04 NOTE — PROGRESS NOTES
HPI: Trevor Rocio  80year old female who lives at [facility] independent living with [address] significant for A-fib, CKD stage 3 with baseline Cr around 1.5 and atrophic right kidney, HTN, Hypothyroidism, peritoneal carcinomatosis s/p 1st round of chemo on hematuria/dysuria/frequency. No n/v/d. Afebrile.      PHYSICAL EXAM:  GENERAL HEALTH: NAD  HEENT: atraumatic/normocephalic, mucous membranes pink and moist, PERRLA, EOMI, sclera anicteric, conjunctiva normal.    RESPIRATORY: CTAB  CARDIOVASCULAR: [id number]  A 10/2 wbc 12.6 plt 121          JAVON Negrete    10/04/21   4:51 PM

## 2021-11-10 ENCOUNTER — HOSPITAL ENCOUNTER (EMERGENCY)
Facility: HOSPITAL | Age: 86
Discharge: HOME | End: 2021-11-10
Attending: EMERGENCY MEDICINE
Payer: MEDICARE

## 2021-11-10 VITALS
RESPIRATION RATE: 18 BRPM | OXYGEN SATURATION: 96 % | TEMPERATURE: 98 F | SYSTOLIC BLOOD PRESSURE: 138 MMHG | DIASTOLIC BLOOD PRESSURE: 86 MMHG | BODY MASS INDEX: 25.91 KG/M2 | WEIGHT: 132 LBS | HEART RATE: 86 BPM | HEIGHT: 60 IN

## 2021-11-10 DIAGNOSIS — L03.115 CELLULITIS OF RIGHT FOOT: Primary | ICD-10-CM

## 2021-11-10 PROBLEM — L03.119 CELLULITIS OF FOOT: Status: ACTIVE | Noted: 2021-11-10

## 2021-11-10 PROCEDURE — 99283 EMERGENCY DEPT VISIT LOW MDM: CPT

## 2021-11-10 RX ORDER — CEPHALEXIN 500 MG/1
500 CAPSULE ORAL 3 TIMES DAILY
Qty: 30 CAPSULE | Refills: 0 | Status: SHIPPED | OUTPATIENT
Start: 2021-11-10 | End: 2021-11-20

## 2021-11-10 RX ORDER — CEPHALEXIN 500 MG/1
1500 CAPSULE ORAL ONCE
Status: COMPLETED | OUTPATIENT
Start: 2021-11-10 | End: 2021-11-10

## 2021-11-11 NOTE — ED QUICK NOTES
Pt dcd to home aox3, per wheelchair, discharge instruction given and voices understanding, prescription sent to preferred pharmacy, denies any concern

## 2021-11-11 NOTE — ED PROVIDER NOTES
Patient Seen in: [facility] AND [facility] Emergency Department    History   Patient presents with: Foot Pain  Cellulitis      HPI    The patient presents to the ED complaining of a blister to the top of her right foot after wearing tight shoes.   She states [employer])       All measures to prevent infection transmission during my interaction with the patient were taken. The patient was already wearing a droplet mask on my arrival to the room.  Personal protective equipment including droplet mask, eye protection, and [facility]) cap 1,500 mg (1,500 mg Oral Given 11/10/21 1829)         MDM      11/10/21  1533 11/10/21  1845   BP: 141/82 138/86   Pulse: 105 86   Resp: 20 18   Temp: 97.8 °F (36.6 °C)    TempSrc: Oral    SpO2: 98% 96%   Weight: 59.9 kg    Height: 152.4 cm (5'

## 2021-11-19 ENCOUNTER — TELEPHONE (OUTPATIENT)
Dept: HEMATOLOGY/ONCOLOGY | Facility: HOSPITAL | Age: 86
End: 2021-11-19

## 2021-11-19 NOTE — TELEPHONE ENCOUNTER
Called and Spoke with Charley Eller RN at from Dr Heath Aly office. I told her that we were expecting patient today at [facility] stated that they thought the patient was coming Monday for TS.   Charley Eller states that the patient is still getting her Chemother

## 2021-11-20 ENCOUNTER — LAB ENCOUNTER (OUTPATIENT)
Dept: LAB | Facility: HOSPITAL | Age: 86
End: 2021-11-20
Attending: NURSE PRACTITIONER
Payer: MEDICARE

## 2021-11-20 DIAGNOSIS — C78.6 CARCINOMATOSIS PERITONEI (HCC): ICD-10-CM

## 2021-11-20 DIAGNOSIS — C48.2 MALIGNANT NEOPLASM OF PERITONEUM (HCC): ICD-10-CM

## 2021-11-20 DIAGNOSIS — C78.6 PERITONEAL CARCINOMATOSIS (HCC): ICD-10-CM

## 2021-11-20 PROCEDURE — 86850 RBC ANTIBODY SCREEN: CPT

## 2021-11-20 PROCEDURE — 86900 BLOOD TYPING SEROLOGIC ABO: CPT

## 2021-11-20 PROCEDURE — 36415 COLL VENOUS BLD VENIPUNCTURE: CPT

## 2021-11-20 PROCEDURE — 86901 BLOOD TYPING SEROLOGIC RH(D): CPT

## 2021-11-22 ENCOUNTER — OFFICE VISIT (OUTPATIENT)
Dept: HEMATOLOGY/ONCOLOGY | Facility: HOSPITAL | Age: 86
End: 2021-11-22
Attending: INTERNAL MEDICINE
Payer: MEDICARE

## 2021-11-22 VITALS
RESPIRATION RATE: 16 BRPM | DIASTOLIC BLOOD PRESSURE: 91 MMHG | TEMPERATURE: 98 F | HEART RATE: 84 BPM | SYSTOLIC BLOOD PRESSURE: 151 MMHG

## 2021-11-22 DIAGNOSIS — C78.6 CARCINOMATOSIS PERITONEI (HCC): ICD-10-CM

## 2021-11-22 DIAGNOSIS — D64.9 ANEMIA, UNSPECIFIED TYPE: Primary | ICD-10-CM

## 2021-11-22 PROCEDURE — 36430 TRANSFUSION BLD/BLD COMPNT: CPT

## 2021-11-22 RX ORDER — HEPARIN SODIUM (PORCINE) LOCK FLUSH IV SOLN 100 UNIT/ML 100 UNIT/ML
5 SOLUTION INTRAVENOUS ONCE
Status: COMPLETED | OUTPATIENT
Start: 2021-11-22 | End: 2021-11-22

## 2021-11-22 RX ORDER — HEPARIN SODIUM (PORCINE) LOCK FLUSH IV SOLN 100 UNIT/ML 100 UNIT/ML
SOLUTION INTRAVENOUS
Status: COMPLETED
Start: 2021-11-22 | End: 2021-11-22

## 2021-11-22 RX ORDER — HEPARIN SODIUM (PORCINE) LOCK FLUSH IV SOLN 100 UNIT/ML 100 UNIT/ML
5 SOLUTION INTRAVENOUS ONCE
OUTPATIENT
Start: 2021-11-22

## 2021-11-22 RX ORDER — SODIUM CHLORIDE 9 MG/ML
10 INJECTION INTRAVENOUS ONCE
OUTPATIENT
Start: 2021-11-22

## 2021-11-22 RX ORDER — SODIUM CHLORIDE 9 MG/ML
INJECTION INTRAVENOUS
Status: DISCONTINUED
Start: 2021-11-22 | End: 2021-11-22 | Stop reason: WASHOUT

## 2021-11-22 RX ADMIN — HEPARIN SODIUM (PORCINE) LOCK FLUSH IV SOLN 100 UNIT/ML 500 UNITS: 100 SOLUTION INTRAVENOUS at 08:30:00

## 2021-11-22 NOTE — PROGRESS NOTES
Pt here for blood transfusion. To receive 2 unit(s). Lab Results   Component Value Date    HGB 7.7 (L) 11/19/2021    HCT 23.8 (L) 11/19/2021     Reports she is fatigue and SOB with activity.  Patient gets chemotherapy at [facility]. Reports she has

## 2021-11-28 ENCOUNTER — HOSPITAL ENCOUNTER (EMERGENCY)
Facility: HOSPITAL | Age: 86
Discharge: HOME | End: 2021-11-28
Attending: EMERGENCY MEDICINE
Payer: MEDICARE

## 2021-11-28 VITALS
DIASTOLIC BLOOD PRESSURE: 102 MMHG | SYSTOLIC BLOOD PRESSURE: 153 MMHG | BODY MASS INDEX: 25.91 KG/M2 | WEIGHT: 132 LBS | HEIGHT: 60 IN | HEART RATE: 103 BPM | TEMPERATURE: 98 F | OXYGEN SATURATION: 97 % | RESPIRATION RATE: 18 BRPM

## 2021-11-28 DIAGNOSIS — R04.0 EPISTAXIS: Primary | ICD-10-CM

## 2021-11-28 PROCEDURE — 30903 CONTROL OF NOSEBLEED: CPT

## 2021-11-28 PROCEDURE — 99284 EMERGENCY DEPT VISIT MOD MDM: CPT

## 2021-11-28 PROCEDURE — 36415 COLL VENOUS BLD VENIPUNCTURE: CPT

## 2021-11-28 PROCEDURE — 85060 BLOOD SMEAR INTERPRETATION: CPT | Performed by: EMERGENCY MEDICINE

## 2021-11-28 PROCEDURE — 85025 COMPLETE CBC W/AUTO DIFF WBC: CPT | Performed by: EMERGENCY MEDICINE

## 2021-11-29 ENCOUNTER — HOSPITAL ENCOUNTER (INPATIENT)
Facility: HOSPITAL | Age: 86
LOS: 4 days | Discharge: NURSING, REHAB OR LONG-TERM CARE | DRG: 602 | End: 2021-12-03
Attending: EMERGENCY MEDICINE | Admitting: INTERNAL MEDICINE
Payer: MEDICARE

## 2021-11-29 DIAGNOSIS — D61.818 PANCYTOPENIA (HCC): ICD-10-CM

## 2021-11-29 DIAGNOSIS — D70.9 NEUTROPENIA, UNSPECIFIED TYPE (HCC): ICD-10-CM

## 2021-11-29 DIAGNOSIS — L03.115 CELLULITIS OF BOTH LOWER EXTREMITIES: Primary | ICD-10-CM

## 2021-11-29 DIAGNOSIS — L03.116 CELLULITIS OF BOTH LOWER EXTREMITIES: Primary | ICD-10-CM

## 2021-11-29 DIAGNOSIS — E83.42 HYPOMAGNESEMIA: ICD-10-CM

## 2021-11-29 PROCEDURE — 99285 EMERGENCY DEPT VISIT HI MDM: CPT

## 2021-11-29 PROCEDURE — 84100 ASSAY OF PHOSPHORUS: CPT | Performed by: INTERNAL MEDICINE

## 2021-11-29 PROCEDURE — 36415 COLL VENOUS BLD VENIPUNCTURE: CPT

## 2021-11-29 PROCEDURE — 96365 THER/PROPH/DIAG IV INF INIT: CPT

## 2021-11-29 PROCEDURE — 80048 BASIC METABOLIC PNL TOTAL CA: CPT | Performed by: EMERGENCY MEDICINE

## 2021-11-29 PROCEDURE — 81001 URINALYSIS AUTO W/SCOPE: CPT | Performed by: EMERGENCY MEDICINE

## 2021-11-29 PROCEDURE — 83735 ASSAY OF MAGNESIUM: CPT | Performed by: EMERGENCY MEDICINE

## 2021-11-29 PROCEDURE — 96375 TX/PRO/DX INJ NEW DRUG ADDON: CPT

## 2021-11-29 PROCEDURE — 85025 COMPLETE CBC W/AUTO DIFF WBC: CPT | Performed by: EMERGENCY MEDICINE

## 2021-11-29 PROCEDURE — 87040 BLOOD CULTURE FOR BACTERIA: CPT | Performed by: EMERGENCY MEDICINE

## 2021-11-29 RX ORDER — VANCOMYCIN HYDROCHLORIDE
25 ONCE
Status: COMPLETED | OUTPATIENT
Start: 2021-11-29 | End: 2021-11-29

## 2021-11-29 RX ORDER — LEVOTHYROXINE SODIUM 0.05 MG/1
75 TABLET ORAL
Status: DISCONTINUED | OUTPATIENT
Start: 2021-11-29 | End: 2021-12-03

## 2021-11-29 RX ORDER — BISACODYL 10 MG
10 SUPPOSITORY, RECTAL RECTAL
Status: DISCONTINUED | OUTPATIENT
Start: 2021-11-29 | End: 2021-12-03

## 2021-11-29 RX ORDER — CLOTRIMAZOLE AND BETAMETHASONE DIPROPIONATE 10; .64 MG/G; MG/G
CREAM TOPICAL 2 TIMES DAILY
Status: DISCONTINUED | OUTPATIENT
Start: 2021-11-29 | End: 2021-12-03

## 2021-11-29 RX ORDER — GABAPENTIN 100 MG/1
100 CAPSULE ORAL 3 TIMES DAILY
Status: DISCONTINUED | OUTPATIENT
Start: 2021-11-29 | End: 2021-12-03

## 2021-11-29 RX ORDER — ONDANSETRON 2 MG/ML
4 INJECTION INTRAMUSCULAR; INTRAVENOUS EVERY 6 HOURS PRN
Status: DISCONTINUED | OUTPATIENT
Start: 2021-11-29 | End: 2021-12-03

## 2021-11-29 RX ORDER — METOCLOPRAMIDE HYDROCHLORIDE 5 MG/ML
5 INJECTION INTRAMUSCULAR; INTRAVENOUS EVERY 8 HOURS PRN
Status: DISCONTINUED | OUTPATIENT
Start: 2021-11-29 | End: 2021-12-03

## 2021-11-29 RX ORDER — FUROSEMIDE 10 MG/ML
20 INJECTION INTRAMUSCULAR; INTRAVENOUS ONCE
Status: COMPLETED | OUTPATIENT
Start: 2021-11-29 | End: 2021-11-29

## 2021-11-29 RX ORDER — HEPARIN SODIUM 5000 [USP'U]/ML
5000 INJECTION, SOLUTION INTRAVENOUS; SUBCUTANEOUS EVERY 8 HOURS SCHEDULED
Status: DISCONTINUED | OUTPATIENT
Start: 2021-11-29 | End: 2021-11-29

## 2021-11-29 RX ORDER — POLYETHYLENE GLYCOL 3350 17 G/17G
17 POWDER, FOR SOLUTION ORAL DAILY PRN
Status: DISCONTINUED | OUTPATIENT
Start: 2021-11-29 | End: 2021-12-03

## 2021-11-29 RX ORDER — ACETAMINOPHEN 325 MG/1
650 TABLET ORAL EVERY 6 HOURS PRN
Status: DISCONTINUED | OUTPATIENT
Start: 2021-11-29 | End: 2021-12-03

## 2021-11-29 RX ORDER — ATORVASTATIN CALCIUM 10 MG/1
10 TABLET, FILM COATED ORAL NIGHTLY
Status: DISCONTINUED | OUTPATIENT
Start: 2021-11-29 | End: 2021-12-03

## 2021-11-29 RX ORDER — FUROSEMIDE 20 MG/1
20 TABLET ORAL DAILY
Status: DISCONTINUED | OUTPATIENT
Start: 2021-11-30 | End: 2021-12-03

## 2021-11-29 NOTE — ED PROVIDER NOTES
Patient Seen in: [facility] Emergency Department      History   Patient presents with:  Nose Bleed    Stated Complaint: nosebleed    Subjective:   HPI    Pt is 81 yo F who arrives by EMS from home with epistaxis from left nare since 530 pm. Pt on kg/m²         Physical Exam    GENERAL: No acute distress, awake and alert  HEENT: MMM, nares on left with large clots. Right clear.  Oropharynx normal. Conjunctiva normal  Neck: supple, non tender  CV: tachycardic, no murmurs  Resp: CTAB, no wheezes or ret future.     \"You had elevated blood pressure today and you need to follow up with your doctor for a repeat blood pressure check and further discussion of lifestyle modifications that include Weight Reduction - Dietary Sodium Restriction - Increased Physica

## 2021-11-29 NOTE — PROGRESS NOTES
[facility] Dosing Service    Initial Pharmacokinetic Consult for Vancomycin Dosing     Lucas Carlos is a 80year old patient who is being treated for cellulitis.      Weights:  Ideal body weight: 45.5 kg (100 lb 4.9 oz)  Adjusted ideal body weight: 51

## 2021-11-29 NOTE — ED INITIAL ASSESSMENT (HPI)
Pt arrived via [place] from [facility]. Pt c/o nose bleed and bleeding from left eye. Pt denies pain from eye. Pt on eliquis.

## 2021-11-29 NOTE — ED QUICK NOTES
Patient provided with discharge instructions. Verbalized understanding for plan of care at home and follow up. All question and concerns addressed prior to discharge. Let pt know rx was sent to pharmacy.

## 2021-11-29 NOTE — H&P
[facility] Hospitalist H&P       CC: Patient presents with:  Swelling  Skin Problem       PCP: Adrian Maciel MD    History of Present Illness: Patient is a 80 year old female with PMH sig for Ovarian Ca, A-fib, Diastolic HF, CKD stage 3 with baseline Cr around 20   SpO2 99%   General:  Alert, no distress   Head:  Normocephalic, no hair, atraumatic. Eyes:  Sclera anicteric, No conjunctival pallor, EOMs intact.     Nose: Mask in place    Throat: Mask in place    Neck: Supple, symmetrical, trachea midline, no LAD transfusion last admission   - 2 units transfused after 3rd round of chemo   –Eliquis    - trend counts      A. fib, paroxysmal  –Continue beta-blockers  - Eliquis    Chronic diastolic HF  Lower extremity swelling  –Echo October 2021 TTE-EF 60%, no WMA  –L

## 2021-11-29 NOTE — PROGRESS NOTES
[facility] Pharmacy Note:  Renal Adjustment for cefepime (MAXIPIME)    Olive Ac is a 80year old patient who has been prescribed cefepime (MAXIPIME) 1000 mg every 8 hrs.   The estimated creatinine clearance is 21 mL/min (A) (based on SCr of 1.28 mg/dL (H))

## 2021-11-29 NOTE — PROGRESS NOTES
Pt arrived from ED, Mag infusing through R AC PIV. A/O x4. Tele initiated, no calls. IV abx continued. BLEs elevated on pillow. Spandigrip to BLE. Fall precautions in place. Call light within reach. Frequent rounding.  Plan to dc back to [facility] when med

## 2021-11-29 NOTE — CONSULTS
[id number] [facility] Infectious Disease  Report of Consultation    Jamal Kid Patient Status:  Emergency    1931 MRN [id number]   Location [address] [facility]    Medications:    Current Facility-Administered Medications:   •  Cefepime HCl (MAXIPIME) 1 g in sodium chloride 0.9% 100 mL IVPB-MBP, 1 g, Intravenous, Once  •  vancomycin IVPB premix 1.5g in 0.9% NaCl 250 mL, 25 mg/kg, Intravenous, Once    Revi edema, erythema, weeping. Large bullae on L medial ankle. Skin: No rashes or lesions. Neurological: No focal neurologic deficits.     Lab Data Review:  Lab Results   Component Value Date    WBC 1.6 11/29/2021    HGB 10.6 11/29/2021    HCT 32.8 11/29/20

## 2021-11-29 NOTE — ED QUICK NOTES
Orders for admission, patient is aware of plan and ready to go upstairs. Any questions, please call ED RN Brandi Walker at extension [phone].    Type of COVID test sent: Rapid  COVID Suspicion level: Low  Titratable drug(s) infusing: Magnesium 4g, Cefepime 1g  Rate:

## 2021-11-29 NOTE — ED PROVIDER NOTES
Patient Seen in: [facility] AND [facility] Emergency Department      History   Patient presents with:  Swelling  Skin Problem    Stated Complaint: lower extremity weeping edema    Subjective:   HPI    79-year-old female presents for evaluation for bilateral lowe extremity weeping edema  Other systems are as noted in HPI. Constitutional and vital signs reviewed. All other systems reviewed and negative except as noted above.     Physical Exam     ED Triage Vitals [11/29/21 1238]   BP (!) 142/96   Pulse 104   Re ED Course     Labs Reviewed   BASIC METABOLIC PANEL (8) - Abnormal; Notable for the following components:       Result Value    Glucose 120 (*)     Potassium 3.4 (*)     BUN 35 (*)     Creatinine 1.28 (*)     BUN/CREA Ratio 27.3 (*)     Calcium, Total neutropenia and electrolyte abnormality.      Total Critical Care Time: 35 minutes including time spent examining and re-evaluating the patient, ordering and reviewing laboratory tests, documenting, reviewing previous records, obtaining information from the

## 2021-11-30 PROCEDURE — 84100 ASSAY OF PHOSPHORUS: CPT | Performed by: HOSPITALIST

## 2021-11-30 PROCEDURE — 80053 COMPREHEN METABOLIC PANEL: CPT | Performed by: INTERNAL MEDICINE

## 2021-11-30 PROCEDURE — 97116 GAIT TRAINING THERAPY: CPT

## 2021-11-30 PROCEDURE — 97161 PT EVAL LOW COMPLEX 20 MIN: CPT

## 2021-11-30 PROCEDURE — 83735 ASSAY OF MAGNESIUM: CPT | Performed by: INTERNAL MEDICINE

## 2021-11-30 PROCEDURE — 85025 COMPLETE CBC W/AUTO DIFF WBC: CPT | Performed by: INTERNAL MEDICINE

## 2021-11-30 RX ORDER — VANCOMYCIN HYDROCHLORIDE 125 MG/1
125 CAPSULE ORAL DAILY
Status: DISCONTINUED | OUTPATIENT
Start: 2021-11-30 | End: 2021-12-03

## 2021-11-30 NOTE — PLAN OF CARE
Pt A/O x4, VSS. Tolerating diet. No calls from tele. IV abx continued. Spandagrip changed per orders. BLE elevated. Neutropenic precautions in place. PT eval. Ambulating with assist. Ephraim Rumble in place. Fall precautions in place. Call light within reach.  Ca strengthening/mobility  - Encourage toileting schedule  Outcome: Progressing     Problem: PAIN - ADULT  Goal: Verbalizes/displays adequate comfort level or patient's stated pain goal  Description: INTERVENTIONS:  - Encourage pt to monitor pain and request resources and transportation as appropriate  - Identify discharge learning needs (meds, wound care, etc)  - Arrange for interpreters to assist at discharge as needed  - Consider post-discharge preferences of patient/family/discharge partner  - Complete ALEX

## 2021-11-30 NOTE — PROGRESS NOTES
[facility]    CDI Prediction Tool Protocol (Vancomycin Initiated)    OVP (oral vancomycin prophylaxis) 125 mg PO Daily is being started in this patient based on a score of 13.       Score Breakdown:  High risk antibiotic use (5 points)  Malignanc

## 2021-11-30 NOTE — PHYSICAL THERAPY NOTE
PT order received, chart reviewed. Patient presents to hospital with BLE edema and redness. PMH of ovarian CA, diastolic HF, CKD-III. She lives alone at [address] [place] [address] and uses a RW to get around.      RN approves participation, patient presents in bed rest

## 2021-11-30 NOTE — PHYSICAL THERAPY NOTE
PHYSICAL THERAPY EVALUATION - INPATIENT     Room Number: 468/468-A  Evaluation Date: 11/30/2021  Type of Evaluation: Initial   Physician Order: PT Eval and Treat    Presenting Problem: weakness, BLE pain, edema, cellulitis     Reason for Therapy: Mobility bathroom in the daytime as much as possible. RN/PCT aware of mobility status. Patient will benefit from continued IP PT services to address these deficits in preparation for discharge.     DISCHARGE RECOMMENDATIONS  PT Discharge Recommendations: Sub-acut chair for MD offices. She has meals in her room and does some light meal prep. Independent with bathing and dressing.     SUBJECTIVE  \"I feel weak and my legs are really hurting\"    PHYSICAL THERAPY EXAMINATION     OBJECTIVE  Precautions: Limb alert - rig Approx Degree of Impairment: 57.7%   Standardized Score (AM-PAC Scale): 39.45   CMS Modifier (G-Code): CK    FUNCTIONAL ABILITY STATUS  Functional Mobility/Gait Assessment  Gait Assistance:  Moderate assistance  Distance (ft): 10'x2  Assistive Device: Rol

## 2021-11-30 NOTE — PROGRESS NOTES
[address] [facility]  Progress Note    Richar Rios Patient Status:  Inpatient    1931 MRN [id number]   Location [facility] 4W/SW/SE Attending Ariane Buckley, [address] [facility] Day # 1 PCP Rosa Odom, noted     3. Fungal dermatitis  - Continue lotrisone     4. Disposition - inpatient. Supportive care ongoing. Encourage elevation and compression. Gentle diuresis as able. Continue vancomycin and cefepime with duration to be determined. D/w patient.

## 2021-11-30 NOTE — PLAN OF CARE
No acute changes overnight. Pt states adequate rest overnight. Voiding via purewick. Gabapentin started. IV abx as ordered/scheduled. Tolerating diet. TubiGrip bandage in place and changed per order.       Problem: Patient/Family Goals  Goal: Patient/Family document risk factors for pressure ulcer development  - Assess and document skin integrity  - Monitor for areas of redness and/or skin breakdown  - Initiate interventions, skin care algorithm/standards of care as needed  Outcome: Progressing  Goal: Incisio

## 2021-11-30 NOTE — CM/SW NOTE
11/30/21 1300   CM/SW Referral Data   Referral Source Social Work (self-referral)   Reason for Referral Discharge planning   Informant Patient   Pertinent Medical Hx   Does patient have an established PCP?  Yes   Patient Info   Patient's Current Mental S

## 2021-11-30 NOTE — PROGRESS NOTES
[facility] Hospitalist Progress Note     CC: Hospital Follow up    PCP: Deborah Arshad MD       Assessment/Plan:     Principal Problem:    Cellulitis of both lower extremities  Active Problems:    Neutropenia, unspecified type ([address]    Pancytopenia ([address]    Hypom ask questions and note understanding and agreeing with therapeutic plan as outlined     Zeynep Mckoy MD  [facility]  Answering Service number: [phone]     Subjective:     Still having leg pain.  Redness and swelling improved ON 12/1/2021] vancomycin  15 mg/kg Intravenous Q48H   • apixaban  2.5 mg Oral BID   • atorvastatin  10 mg Oral Nightly   • furosemide  20 mg Oral Daily   • gabapentin  100 mg Oral TID   • levothyroxine  75 mcg Oral Before breakfast   • metoprolol tartrate

## 2021-12-01 PROCEDURE — 85025 COMPLETE CBC W/AUTO DIFF WBC: CPT | Performed by: HOSPITALIST

## 2021-12-01 PROCEDURE — 80048 BASIC METABOLIC PNL TOTAL CA: CPT | Performed by: HOSPITALIST

## 2021-12-01 NOTE — PLAN OF CARE
Patient is alert and oriented x 4. Vital signs stable. Edema in lower extremities is reduced. Wrap changed on BLE as ordered. Patient reports tingling \"pins and needles\" pain in feet and fingers bilaterally. Neutropenic and fall precautions in place.   Re assist with strengthening/mobility  - Encourage toileting schedule  Outcome: Progressing     Problem: PAIN - ADULT  Goal: Verbalizes/displays adequate comfort level or patient's stated pain goal  Description: INTERVENTIONS:  - Encourage pt to monitor pain discharge resources and transportation as appropriate  - Identify discharge learning needs (meds, wound care, etc)  - Arrange for interpreters to assist at discharge as needed  - Consider post-discharge preferences of patient/family/discharge partner  - Co

## 2021-12-01 NOTE — DIETARY NOTE
ADULT NUTRITION INITIAL ASSESSMENT    Pt is at moderate nutrition risk. Pt does not meet malnutrition criteria.       RECOMMENDATIONS TO MD:  See Nutrition Intervention    ADMITTING DIAGNOSIS:   Hypomagnesemia [E83.42]  Pancytopenia ([facility] [D61.818]  Cellul Oral TID   • levothyroxine  75 mcg Oral Before breakfast   • metoprolol tartrate  25 mg Oral 2x Daily(Beta Blocker)     LABS: reviewed  Recent Labs     11/29/21  1243 11/30/21  0543 12/01/21  0625   * 85 80   BUN 35* 32* 35*   CREATSERUM 1.28* 1.07* Encounter      Regular/General diet Is Patient on Accuchecks? No    - Meals and snacks: Encouraged adequate PO intake  - Medical Food and or Oral Nutritional Supplements (ONS)-RD added Ensure Enlive (350 calories/ 20 g protein each) Vanilla Daily.  Rational

## 2021-12-01 NOTE — PLAN OF CARE
No acute changes overnight. Pt with BM overnight after requesting MiraLax. Pt states adequate rest overnight. Voiding via purewick. IV abx as ordered/scheduled. Tolerating diet. Spandagrip bandage in place and changed per order.       Problem: Patient Cent Verbalizes/displays adequate comfort level or patient's stated pain goal  Description: INTERVENTIONS:  - Encourage pt to monitor pain and request assistance  - Assess pain using appropriate pain scale  - Administer analgesics based on type and severity of for interpreters to assist at discharge as needed  - Consider post-discharge preferences of patient/family/discharge partner  - Complete POLST form as appropriate  - Assess patient's ability to be responsible for managing their own health  - Refer to Case

## 2021-12-01 NOTE — PROGRESS NOTES
[id number] [facility]  Progress Note    Richar Rios Patient Status:  Inpatient    1931 MRN [id number]   Location [facility] 4W/SW/SE Attending Ariane Buckley,  [facility] Day # 2 PCP Rosa Odom, antibiotics with discharge on cefadroxil for an additional 10 days. D/w patient. Will follow. Margie Morrow [facility] Infectious Disease  [phone]    12/1/2021  1:00 PM

## 2021-12-01 NOTE — CONSULTS
Hematology/Oncology Consult Note        NAME: Luiza Mosher - ROOM: [facility]- - MRN: [id number] - Age: 80year old - : 1931    Reason for Consult:  Ovarian cancer, cellulitis, neutropenia    Patient is a 80 y.o female who is currently admitted negative than HPI      Physical Exam:  /76 (BP Location: Left arm)   Pulse 85   Temp 97.4 °F (36.3 °C) (Axillary)   Resp 18   Wt 61 kg (134 lb 6.4 oz)   SpO2 99%   BMI 26.25 kg/m²   Physical Exam:   General: alert, cooperative, no respiratory distres prior to discharge with CT chest/abd/pelvis with contrast     Tennis Backers is comfortable with my recommendations and had their questions answered to their satisfaction.    Thank you for allowing me to participate in the care of this patient, please yarely

## 2021-12-01 NOTE — PROGRESS NOTES
[facility] Hospitalist Progress Note     CC: Hospital Follow up    PCP: Cherylene Eaves, MD       Assessment/Plan:     Principal Problem:    Cellulitis of both lower extremities  Active Problems:    Neutropenia, unspecified type ([address]    Pancytopenia ([address]    Hypom pending patient's clinical course.   [facility] hospitalist to continue to follow patient while in house     Patient and/or patient's family given opportunity to ask questions and note understanding and agreeing with therapeutic plan as outlined     Maureen Sánchez 17   ALB 1.8*         Imaging:  No results found.       Meds:     • vancomycin  125 mg Oral Daily   • cefepime  1 g Intravenous q12h   • clotrimazole-betamethasone   Topical BID   • vancomycin  15 mg/kg Intravenous Q48H   • apixaban  2.5 mg Oral BID   • celeste

## 2021-12-02 ENCOUNTER — APPOINTMENT (OUTPATIENT)
Dept: CT IMAGING | Facility: HOSPITAL | Age: 86
DRG: 602 | End: 2021-12-02
Attending: HOSPITALIST
Payer: MEDICARE

## 2021-12-02 PROCEDURE — 85025 COMPLETE CBC W/AUTO DIFF WBC: CPT | Performed by: HOSPITALIST

## 2021-12-02 PROCEDURE — 80048 BASIC METABOLIC PNL TOTAL CA: CPT | Performed by: HOSPITALIST

## 2021-12-02 PROCEDURE — 71250 CT THORAX DX C-: CPT | Performed by: HOSPITALIST

## 2021-12-02 PROCEDURE — 74176 CT ABD & PELVIS W/O CONTRAST: CPT | Performed by: HOSPITALIST

## 2021-12-02 RX ORDER — CEPHALEXIN 500 MG/1
500 TABLET ORAL 3 TIMES DAILY
Qty: 30 TABLET | Refills: 0 | Status: SHIPPED | OUTPATIENT
Start: 2021-12-02 | End: 2021-12-12

## 2021-12-02 NOTE — PLAN OF CARE
Patient is alert and oriented x 4. Vital signs stable. Wrap changed on BLE as ordered. CT abdomen/pelvis. Voiding, BM loose. Fall precautions in place. Up to chair with walker and assist +2. Remote telemetry in use. IV antibiotics.   Plan for DC with oral schedule  Outcome: Progressing     Problem: PAIN - ADULT  Goal: Verbalizes/displays adequate comfort level or patient's stated pain goal  Description: INTERVENTIONS:  - Encourage pt to monitor pain and request assistance  - Assess pain using appropriate pa Identify discharge learning needs (meds, wound care, etc)  - Arrange for interpreters to assist at discharge as needed  - Consider post-discharge preferences of patient/family/discharge partner  - Complete POLST form as appropriate  - Assess patient's abil

## 2021-12-02 NOTE — PROGRESS NOTES
[address] [place] [facility] Infectious Disease  Progress Note    Mayrajuan carlos Woodall Patient Status:  Inpatient    1931 MRN [id number]   Location [facility] 4W/SW/SE Attending Olive Guzman,  [facility] Day # 3 PCP Shiloh Mary, cephalexin 500mg p.o. TID x 10 days. Margie Tejeda  [facility] Infectious Disease  [phone]    12/2/2021  1:42 PM

## 2021-12-02 NOTE — PROGRESS NOTES
[facility] Hospitalist Progress Note     CC: Hospital Follow up    PCP: Srini Ozuna MD       Assessment/Plan:     Principal Problem:    Cellulitis of both lower extremities  Active Problems:    Neutropenia, unspecified type ([address]    Pancytopenia ([address]    Hypom recommendations pending patient's clinical course.   [facility] hospitalist to continue to follow patient while in house     Patient and/or patient's family given opportunity to ask questions and note understanding and agreeing with therapeutic plan as outlined    Lab 11/30/21  0543   ALT 29   AST 17   ALB 1.8*         Imaging:  No results found.       Meds:     • vancomycin  125 mg Oral Daily   • cefepime  1 g Intravenous q12h   • clotrimazole-betamethasone   Topical BID   • vancomycin  15 mg/kg Intravenous Q48H

## 2021-12-02 NOTE — PLAN OF CARE
No acute changes overnight. Pt with BM overnight. Voiding via purewick. IV abx as ordered/scheduled. Tolerating diet. Spandagrip bandage in place and changed per order.      Problem: Patient Centered Care  Goal: Patient preferences are identified and integ Dana Dominguez RN  Outcome: Progressing  12/2/2021 0249 by Dana Dominguez RN  Outcome: Progressing     Problem: PAIN - ADULT  Goal: Verbalizes/displays adequate comfort level or patient's stated pain goal  Description: INTERVENTIONS:  - Encourage pt to m facility with appropriate resources  Description: INTERVENTIONS:  - Identify barriers to discharge w/pt and caregiver  - Include patient/family/discharge partner in discharge planning  - Arrange for needed discharge resources and transportation as appropri

## 2021-12-02 NOTE — PROGRESS NOTES
Pt arrived on unit. Vital signs stable. Pt is non-verbal and responds only to pain. Parisi in place and draining.

## 2021-12-02 NOTE — CONSULTS
Lian Braun is a 80year old female who I assessed as follows:  Patient presents with:  Swelling  Skin Problem         REASON FOR CONSULTATION:    AF            ASSESSMENT/PLAN:     IMPRESSIONS:  1.  C Never used    Alcohol use: Not Currently      Alcohol/week: 1.0 standard drink      Types: 1 Glasses of wine per week    Drug use: Never         Medications:  vancomycin (VANCOCIN) cap 125 mg, 125 mg, Oral, Daily  acetaminophen (TYLENOL) tab 650 mg, 650 mg Location: Right arm)   Pulse 92   Temp 98.8 °F (37.1 °C) (Oral)   Resp 18   Wt 134 lb 6.4 oz (61 kg)   SpO2 99%   BMI 26.25 kg/m²   Temp (24hrs), Av.7 °F (36.5 °C), Min:97 °F (36.1 °C), Max:98.8 °F (37.1 °C)    Wt Readings from Last 3 Encounters:   Imaging:  No results found.          Annmarie Lilly MD

## 2021-12-03 VITALS
TEMPERATURE: 99 F | HEART RATE: 90 BPM | BODY MASS INDEX: 26 KG/M2 | DIASTOLIC BLOOD PRESSURE: 69 MMHG | SYSTOLIC BLOOD PRESSURE: 110 MMHG | OXYGEN SATURATION: 93 % | RESPIRATION RATE: 17 BRPM | WEIGHT: 134.38 LBS

## 2021-12-03 PROCEDURE — 80048 BASIC METABOLIC PNL TOTAL CA: CPT | Performed by: HOSPITALIST

## 2021-12-03 PROCEDURE — 85027 COMPLETE CBC AUTOMATED: CPT | Performed by: HOSPITALIST

## 2021-12-03 PROCEDURE — 85025 COMPLETE CBC W/AUTO DIFF WBC: CPT | Performed by: HOSPITALIST

## 2021-12-03 PROCEDURE — 85007 BL SMEAR W/DIFF WBC COUNT: CPT | Performed by: HOSPITALIST

## 2021-12-03 NOTE — PROGRESS NOTES
Betty Srivastava is a 80year old female admitted with bilateral LE Cellulitis,     HPI:    Patient seen and examined,   Afebrile,   Previous entries noted,   Ready to be discharged,   Legs are wrapped,     Allergies:    Red Dye                 SWELLING Glasses of wine per week    Drug use: Never       Results for orders placed or performed during the hospital encounter of 04/20/92   Basic Metabolic Panel (8)   Result Value Ref Range    Glucose 120 (H) 70 - 99 mg/dL    Sodium 137 136 - 145 mmol/L    Gill mg/dL    Calculated Osmolality 294 275 - 295 mOsm/kg    GFR, Non- 46 (L) >=60    GFR, -American 53 (L) >=60    ALT 29 13 - 56 U/L    AST 17 15 - 37 U/L    Alkaline Phosphatase 145 (H) 55 - 142 U/L    Bilirubin, Total 0.3 0.1 - 2.0 mg Chloride 107 98 - 112 mmol/L    CO2 25.0 21.0 - 32.0 mmol/L    Anion Gap 5 0 - 18 mmol/L    BUN 45 (H) 7 - 18 mg/dL    Creatinine 1.32 (H) 0.55 - 1.02 mg/dL    BUN/CREA Ratio 34.1 (H) 10.0 - 20.0    Calcium, Total 8.0 (L) 8.5 - 10.1 mg/dL    Calculated Osm uL    Lymphocyte Absolute 0.47 (L) 1.00 - 4.00 x10(3) uL    Monocyte Absolute 0.58 0.10 - 1.00 x10(3) uL    Eosinophil Absolute 0.00 0.00 - 0.70 x10(3) uL    Basophil Absolute 0.01 0.00 - 0.20 x10(3) uL    Immature Granulocyte Absolute 0.02 0.00 - 1.00 x10 Absolute 0.01 0.00 - 0.20 x10(3) uL    Immature Granulocyte Absolute 0.04 0.00 - 1.00 x10(3) uL    Neutrophil % 30.4 %    Lymphocyte % 32.7 %    Monocyte % 33.9 %    Eosinophil % 0.0 %    Basophil % 0.6 %    Immature Granulocyte % 2.4 %   CBC W/ DIFFERENTI 2. 40 1.50 - 7.70 x10 (3) uL    Neutrophil Absolute 2.40 1.50 - 7.70 x10(3) uL    Lymphocyte Absolute 0.64 (L) 1.00 - 4.00 x10(3) uL    Monocyte Absolute 0.48 0.10 - 1.00 x10(3) uL    Eosinophil Absolute 0.02 0.00 - 0.70 x10(3) uL    Basophil Absolute 0.01 11/17/21  - Improving Pancytopenia,      3.  Fungal dermatitis  - Continue lotrisone    PLAN:   1.  Disposition - inpatient.  Supportive care ongoing. 2.  Continued elevation and compression.    3.  Plan for transition to cephalexin 500mg p.o.  TID x 10 da

## 2021-12-03 NOTE — PLAN OF CARE
Patient alert and orientated. Vitals stable . denies pain. incontinent of bowel and bladder. Switching to oral antibiotics. Plan to go to [facility] SNF. Discharge instructions given to patient. Report called to nurse at [facility]. Chest port de-accessed. schedule  Outcome: Progressing     Problem: PAIN - ADULT  Goal: Verbalizes/displays adequate comfort level or patient's stated pain goal  Description: INTERVENTIONS:  - Encourage pt to monitor pain and request assistance  - Assess pain using appropriate pa adequate protection for wounds/incisions during mobilization  - Obtain PT/OT consults as needed  - Advance activity as appropriate  - Communicate ordered activity level and limitations with patient/family  Outcome: Progressing  Goal: Maintain proper alignm

## 2021-12-03 NOTE — PLAN OF CARE
Pt is alert and oriented x4. [place]. C/o some lower back pain overnight, declined medications. Tolerating diet, no nausea. Pt ambulated to bathroom. Voiding via purewick. Multiple bm overnight. Abx given per order. Spandagrip on BLE, changed.     Problem: May[place] ADULT  Goal: Verbalizes/displays adequate comfort level or patient's stated pain goal  Description: INTERVENTIONS:  - Encourage pt to monitor pain and request assistance  - Assess pain using appropriate pain scale  - Administer analgesics based on type and etc)  - Arrange for interpreters to assist at discharge as needed  - Consider post-discharge preferences of patient/family/discharge partner  - Complete POLST form as appropriate  - Assess patient's ability to be responsible for managing their own health

## 2021-12-03 NOTE — DISCHARGE SUMMARY
General Medicine Discharge Summary     Patient ID:  Zandra Abdi  80year old  11/29/1931    Admit date: 11/29/2021    Discharge date and time: 12/03/21    Attending Physician: Jacob Kayser, MD     Primary Care Physician: MD Cruz Salazar inpatient given patient's transportation issues     Pancytopenia   – likely 2/2 chemo  - needed pRBC transfusion last admission   - 2 units transfused after 3rd round of chemo   –Eliquis    - trend counts      A. fib, paroxysmal  –Continue beta-blockers  - Similar to prior. 5. Severe diverticulosis of the sigmoid colon without evidence for acute diverticulitis. 6. Additional incidental findings as above.      Dictated by (CST): Prem Tenorio MD on 12/02/2021 at 7:23 PM     Finalized by (CST): Prem Tenorio, every 6 (six) hours as needed for Nausea.  Every 6 hours prn nausea or as instructed by physician     Vitamin D 48 MCG (2000 UT) Caps           Where to Get Your Medications      These medications were sent to [address] [place] [address],[address], I

## 2021-12-03 NOTE — CM/SW NOTE
SW met with patient at bedside, discussed PT/OT rec for [facility]. Patient is agreeable to [facility] at [facility]. Patient was discussed in rounds, likely DC today. 1152am  Notified by RN that family will drive patient to [facility].  Called and notified Thotz

## 2021-12-03 NOTE — PROGRESS NOTES
[address] [facility]  Progress Note    Rosette Martha Patient Status:  Inpatient    1931 MRN [id number]   Location [facility] 4W/SW/SE Attending Milagro Dean,  [facility] Day # 4 PCP Kaveh Cannon MD     Impression:  Tracy Engle 5 mg, Intravenous, Q8H PRN  Cefepime HCl (MAXIPIME) 1 g in sodium chloride 0.9% 100 mL IVPB-MBP, 1 g, Intravenous, q12h  clotrimazole-betamethasone (LOTRISONE) cream, , Topical, BID  Vancomycin HCl (VANCOCIN) 1,000 mg in sodium chloride 0.9% 250 mL IVPB-AD improved with only trace fluid now present in the left pericolic gutter. Previously there was moderate to large volume intra-abdominal ascites. 3. Over distention of the urinary bladder. Correlate for urinary retention.   4. Severe atrophy of the right k

## 2021-12-13 ENCOUNTER — SNF VISIT (OUTPATIENT)
Dept: INTERNAL MEDICINE CLINIC | Facility: SKILLED NURSING FACILITY | Age: 86
End: 2021-12-13

## 2021-12-13 DIAGNOSIS — I50.9 ACUTE ON CHRONIC CONGESTIVE HEART FAILURE, UNSPECIFIED HEART FAILURE TYPE (HCC): ICD-10-CM

## 2021-12-13 DIAGNOSIS — I48.91 ATRIAL FIBRILLATION WITH RAPID VENTRICULAR RESPONSE (HCC): ICD-10-CM

## 2021-12-13 DIAGNOSIS — L03.115 CELLULITIS OF BOTH LOWER EXTREMITIES: ICD-10-CM

## 2021-12-13 DIAGNOSIS — L03.116 CELLULITIS OF BOTH LOWER EXTREMITIES: ICD-10-CM

## 2021-12-13 DIAGNOSIS — C78.6 PERITONEAL CARCINOMATOSIS (HCC): ICD-10-CM

## 2021-12-13 PROCEDURE — 1123F ACP DISCUSS/DSCN MKR DOCD: CPT | Performed by: NURSE PRACTITIONER

## 2021-12-13 PROCEDURE — 1111F DSCHRG MED/CURRENT MED MERGE: CPT | Performed by: NURSE PRACTITIONER

## 2021-12-13 PROCEDURE — 99309 SBSQ NF CARE MODERATE MDM 30: CPT | Performed by: NURSE PRACTITIONER

## 2021-12-13 NOTE — PROGRESS NOTES
HPI: Myah Smith  is a 80year old female with PMH significant for Ovarian Ca, A-fib, Diastolic HF, CKD stage 3 with baseline Cr around 1.5 and atrophic right kidney, HTN, Hypothyroidism who presented to [address] [place] [address] with b/l lower extremity cellulitis .  Admit membranes pink and moist, PERRLA, EOMI, sclera anicteric, conjunctiva normal.  RESPIRATORY:CTAB  CARDIOVASCULAR: S1S2 rhythm regular, rate 107  ABDOMEN:  normal active BS+, soft, non distended, nontender   MUSCULOSKELETAL/EXTREMITIES: BLE mild edema  SKIN: Eliquis 2.5 bid   -heart rate 100-110 this am , will increase metoprolol to 50 mg bid   -seen by Cardiology 12/6   -followed by Dr Manjula Moraes- send BP/HR log per patient request 12/13, schedule f/u as directed     Chronic diastolic HF/Lower extremity alex

## 2021-12-17 ENCOUNTER — SNF VISIT (OUTPATIENT)
Dept: INTERNAL MEDICINE CLINIC | Facility: SKILLED NURSING FACILITY | Age: 86
End: 2021-12-17

## 2021-12-17 DIAGNOSIS — N18.32 STAGE 3B CHRONIC KIDNEY DISEASE (HCC): ICD-10-CM

## 2021-12-17 DIAGNOSIS — C78.6 PERITONEAL CARCINOMATOSIS (HCC): ICD-10-CM

## 2021-12-17 DIAGNOSIS — D69.6 THROMBOCYTOPENIA (HCC): ICD-10-CM

## 2021-12-17 PROCEDURE — 99308 SBSQ NF CARE LOW MDM 20: CPT | Performed by: NURSE PRACTITIONER

## 2021-12-17 NOTE — PROGRESS NOTES
HPI: Richar Rios  is a 80year old female with PMH significant for Ovarian Ca, A-fib, Diastolic HF, CKD stage 3 with baseline Cr around 1.5 and atrophic right kidney, HTN, Hypothyroidism who presented to [address] [place] [address] with b/l lower extremity cellulitis .  Admit EXAM:  GENERAL HEALTH: NAD  HEENT: atraumatic/normocephalic, mucous membranes pink and moist, PERRLA, EOMI, sclera anicteric, conjunctiva normal.  RESPIRATORY:CTAB  CARDIOVASCULAR: S1S2 RRR  ABDOMEN:  normal active BS+, soft, non distended, nontender   MUS chemo  - 2 units transfused after 3rd round of chemo   -monitor CBC WBC 8.7 hgb  9.6 plt 131  on 12/15    A. fib, paroxysmal  - Eliquis 2.5 bid   -heart rate 100-110 this am , will increase metoprolol to 50 mg bid   -seen by Cardiology 12/6   -followed by

## 2021-12-20 ENCOUNTER — SNF VISIT (OUTPATIENT)
Dept: INTERNAL MEDICINE CLINIC | Facility: SKILLED NURSING FACILITY | Age: 86
End: 2021-12-20

## 2021-12-20 DIAGNOSIS — N18.32 STAGE 3B CHRONIC KIDNEY DISEASE (HCC): ICD-10-CM

## 2021-12-20 DIAGNOSIS — C78.6 CARCINOMATOSIS PERITONEI (HCC): ICD-10-CM

## 2021-12-20 DIAGNOSIS — D72.829 LEUKOCYTOSIS, UNSPECIFIED TYPE: ICD-10-CM

## 2021-12-20 DIAGNOSIS — I10 ESSENTIAL HYPERTENSION, BENIGN: ICD-10-CM

## 2021-12-20 PROCEDURE — 99308 SBSQ NF CARE LOW MDM 20: CPT | Performed by: NURSE PRACTITIONER

## 2021-12-20 NOTE — PROGRESS NOTES
HPI: Shireen Romero  is a 80year old female with PMH significant for Ovarian Ca, A-fib, Diastolic HF, CKD stage 3 with baseline Cr around 1.5 and atrophic right kidney, HTN, Hypothyroidism who presented to [facility] with b/l lower extremity cellulitis .  Admit deficit, follows commands  PSYCHIATRIC: alert and oriented x 3; affect appropriate      ASSESSMENT/PLAN  Wbc 8.7 to 16.1 today , no s/s infection. LE cellulitis resolved.   Check ua with reflex, repeat wbc in am.  Continue PT/OT, transferring to [place] on 12/23 60%, no WMA  - Lasix 20 mg daily     HTN   - Metoprolol 50mg bid increased due to tachycardia   -avoid Norvasc due to LE edema , avoid ace due to solitary kidney   -Hydralazine 25 mg po q 8 hour prn SBP > 160  -rehab cardiology following  -followed by Dr Amari Garcia

## 2021-12-23 ENCOUNTER — SNF DISCHARGE (OUTPATIENT)
Dept: INTERNAL MEDICINE CLINIC | Facility: SKILLED NURSING FACILITY | Age: 86
End: 2021-12-23

## 2021-12-23 DIAGNOSIS — I10 PRIMARY HYPERTENSION: ICD-10-CM

## 2021-12-23 DIAGNOSIS — C78.6 PERITONEAL CARCINOMATOSIS (HCC): ICD-10-CM

## 2021-12-23 DIAGNOSIS — D64.9 ANEMIA, UNSPECIFIED TYPE: ICD-10-CM

## 2021-12-23 PROCEDURE — 99315 NF DSCHRG MGMT 30 MIN/LESS: CPT | Performed by: NURSE PRACTITIONER

## 2021-12-23 NOTE — PROGRESS NOTES
HPI: Hayley Dacosta  is a 80year old female with PMH significant for Ovarian Ca, A-fib, Diastolic HF, CKD stage 3 with baseline Cr around 1.5 and atrophic right kidney, HTN, Hypothyroidism who presented to [address] [place] [address] with b/l lower extremity cellulitis .  Admit oriented x 3; affect appropriate      ASSESSMENT/PLAN  Transferring to [place] today with [facility] services  UA neg, wbc trending down 16.1-->11.1 today no s/s infection  rx signed  F/u PCP 1-2 weeks  Next chemo is on 1/7/21     Cellulitis bilat LE  Cellulitis resolv -Hydralazine 25 mg po q 8 hour prn SBP > 160  -rehab cardiology following  -followed by Dr Jiménez Adjutant outpatient     CKD stage III/History of atrophic kidney on the right  -creat 1.25 bun 41 12/20  -monitor post chemo  -avoid ACE/ARB     HL  - statin

## 2021-12-24 ENCOUNTER — APPOINTMENT (OUTPATIENT)
Dept: CT IMAGING | Facility: HOSPITAL | Age: 86
End: 2021-12-24
Attending: EMERGENCY MEDICINE
Payer: MEDICARE

## 2021-12-24 ENCOUNTER — HOSPITAL ENCOUNTER (EMERGENCY)
Facility: HOSPITAL | Age: 86
Discharge: HOME | End: 2021-12-24
Attending: EMERGENCY MEDICINE
Payer: MEDICARE

## 2021-12-24 VITALS
OXYGEN SATURATION: 97 % | DIASTOLIC BLOOD PRESSURE: 80 MMHG | SYSTOLIC BLOOD PRESSURE: 151 MMHG | RESPIRATION RATE: 18 BRPM | TEMPERATURE: 98 F | HEART RATE: 90 BPM

## 2021-12-24 DIAGNOSIS — S00.10XA PERIORBITAL ECCHYMOSIS, UNSPECIFIED LATERALITY, INITIAL ENCOUNTER: ICD-10-CM
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
## 2021-12-24 PROCEDURE — 70450 CT HEAD/BRAIN W/O DYE: CPT | Performed by: EMERGENCY MEDICINE

## 2021-12-24 PROCEDURE — 85025 COMPLETE CBC W/AUTO DIFF WBC: CPT | Performed by: EMERGENCY MEDICINE

## 2021-12-24 PROCEDURE — 70486 CT MAXILLOFACIAL W/O DYE: CPT | Performed by: EMERGENCY MEDICINE

## 2021-12-24 PROCEDURE — 36415 COLL VENOUS BLD VENIPUNCTURE: CPT

## 2021-12-24 PROCEDURE — 80048 BASIC METABOLIC PNL TOTAL CA: CPT | Performed by: EMERGENCY MEDICINE

## 2021-12-24 PROCEDURE — 82550 ASSAY OF CK (CPK): CPT | Performed by: EMERGENCY MEDICINE

## 2021-12-24 PROCEDURE — 99284 EMERGENCY DEPT VISIT MOD MDM: CPT

## 2021-12-24 NOTE — CM/SW NOTE
Referral placed in [facility] with [facility] since that is where she was last admission. Message left with Jose at [facility].

## 2021-12-24 NOTE — ED PROVIDER NOTES
Patient Seen in: [facility] AND [facility] Emergency Department      History   Patient presents with:  Fall    Stated Complaint: mechanical fall    Subjective:   HPI    Patient is a 24-year-old female who arrives by EMS from assisted living facility for fall.   P systems reviewed and negative except as noted above.     Physical Exam     ED Triage Vitals [12/24/21 1211]   BP (!) 160/91   Pulse 100   Resp 18   Temp 97.5 °F (36.4 °C)   Temp src    SpO2 98 %   O2 Device None (Room air)       Current:BP (!) 160/91   Puls Status                     ---------                               -----------         ------                     CBC W/ DIFFERENTIAL[[id number]]          Abnormal            Final result                 Please view results for these tests on the individu unspecified laterality, initial encounter     Disposition:  Discharge  12/24/2021  4:01 pm    Follow-up:  Carlene Kennedy MD  [facility]  [phone]    In 2 days      We recommend that you schedule follow up care with

## 2021-12-24 NOTE — CM/SW NOTE
Per [facility] at [facility], the pt can return to her assisted living room and they are calling the pt's family concerning caregiver coverage through the weekend or to have the family with the pt.  On Monday they will have a private room for the pt in the rehab

## 2021-12-24 NOTE — ED QUICK NOTES
[facility] ambulance will transport pt to [facility] at this time, pt remains stable, discharge instruction given and voices understanding unknown

## 2021-12-24 NOTE — ED INITIAL ASSESSMENT (HPI)
Pt reports she was reaching for her walker when getting off the toilet today around 1130 and fell with hematoma to back of head and above left eye. NO LOC on eliquis.

## 2021-12-24 NOTE — CM/SW NOTE
[facility] will hire a caregiver for the pt. [facility] called for BLS transport.  ETA: 1650    PCS completed

## 2021-12-24 NOTE — CM/SW NOTE
The pt has been accepted to [facility]. Awaiting bed and number to call report.

## 2021-12-27 ENCOUNTER — SNF VISIT (OUTPATIENT)
Dept: INTERNAL MEDICINE CLINIC | Facility: SKILLED NURSING FACILITY | Age: 86
End: 2021-12-27
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
## 2021-12-27 DIAGNOSIS — C78.6 CARCINOMATOSIS PERITONEI (HCC): ICD-10-CM

## 2021-12-27 DIAGNOSIS — D64.9 ANEMIA, UNSPECIFIED TYPE: ICD-10-CM

## 2021-12-27 PROCEDURE — 99310 SBSQ NF CARE HIGH MDM 45: CPT | Performed by: NURSE PRACTITIONER

## 2021-12-27 NOTE — PROGRESS NOTES
HPI: Lucas Carlos  is a 80year old female with PMH significant for Ovarian Ca, A-fib, Diastolic HF, CKD stage 3 with baseline Cr around 1.5 and atrophic right kidney, HTN, Hypothyroidism who presented to [address] [place] [address] with b/l lower extremity cellulitis .  Admit conjunctiva normal.  RESPIRATORY:CTAB  CARDIOVASCULAR: S1S2 RRR  ABDOMEN:  normal active BS+, soft, non distended, nontender   MUSCULOSKELETAL/EXTREMITIES: BLE 1+ edema with small blisters  SKIN: warm, dry  WOUND: LLE intact dressing  NEUROLOGIC: intact; n the hospital with copious drainage, pain and erythema  -received cefepime and vancomycin.    -seen by ID in the hospital   -cephalexin for 10 days eot 12/13  -wound care to left LE wound at ankle (xeroform and kerlix) until healed  - wound care team followi

## 2021-12-30 ENCOUNTER — SNF VISIT (OUTPATIENT)
Dept: INTERNAL MEDICINE CLINIC | Facility: SKILLED NURSING FACILITY | Age: 86
End: 2021-12-30

## 2021-12-30 DIAGNOSIS — N18.32 STAGE 3B CHRONIC KIDNEY DISEASE (HCC): ICD-10-CM

## 2021-12-30 DIAGNOSIS — C78.6 CARCINOMATOSIS PERITONEI (HCC): ICD-10-CM

## 2021-12-30 DIAGNOSIS — R53.1 WEAKNESS: ICD-10-CM

## 2021-12-30 PROCEDURE — 99308 SBSQ NF CARE LOW MDM 20: CPT | Performed by: NURSE PRACTITIONER

## 2021-12-30 NOTE — PROGRESS NOTES
HPI: Radha Maloney  is a 80year old female with PMH significant for Ovarian Ca, A-fib, Diastolic HF, CKD stage 3 with baseline Cr around 1.5 and atrophic right kidney, HTN, Hypothyroidism who presented to [address] [place] [address] with b/l lower extremity cellulitis.  Admitt normal.  RESPIRATORY:CTAB  CARDIOVASCULAR: S1S2 RRR  ABDOMEN:  normal active BS+, soft, non distended, nontender   MUSCULOSKELETAL/EXTREMITIES: BLE 1+ edema with tubigrips  SKIN: warm, dry  WOUND: BLE intact dressings  NEUROLOGIC: intact; no sensorimotor d cefepime and vancomycin.    -seen by ID in the hospital   -cephalexin for 10 days eot 12/13  -wound care to left LE wound at ankle (xeroform and kerlix) until healed  - wound care team following  -lower extremity erythema resolved    Neutropenia  - no fever

## 2021-12-31 ENCOUNTER — LAB REQUISITION (OUTPATIENT)
Dept: LAB | Facility: HOSPITAL | Age: 86
End: 2021-12-31
Payer: MEDICARE

## 2021-12-31 DIAGNOSIS — Z01.89 ENCOUNTER FOR OTHER SPECIFIED SPECIAL EXAMINATIONS: ICD-10-CM

## 2021-12-31 LAB
BASOPHILS # BLD: 0.11 X10(3) UL (ref 0–0.2)
BASOPHILS NFR BLD: 1 %
DEPRECATED RDW RBC AUTO: 73.2 FL (ref 35.1–46.3)
DOHLE BOD BLD QL SMEAR: PRESENT
EOSINOPHIL # BLD: 0 X10(3) UL (ref 0–0.7)
EOSINOPHIL NFR BLD: 0 %
ERYTHROCYTE [DISTWIDTH] IN BLOOD BY AUTOMATED COUNT: 19.9 % (ref 11–15)
HCT VFR BLD AUTO: 24.2 %
HGB BLD-MCNC: 7.7 G/DL
LYMPHOCYTES NFR BLD: 1.31 X10(3) UL (ref 1–4)
LYMPHOCYTES NFR BLD: 11 %
MCH RBC QN AUTO: 32.2 PG (ref 26–34)
MCHC RBC AUTO-ENTMCNC: 31.8 G/DL (ref 31–37)
MCV RBC AUTO: 101.3 FL
MONOCYTES # BLD: 0.22 X10(3) UL (ref 0.1–1)
MONOCYTES NFR BLD: 2 %
MYELOCYTES # BLD: 0.22 X10(3) UL
MYELOCYTES NFR BLD: 2 %
NEUTROPHILS # BLD AUTO: 8.42 X10 (3) UL (ref 1.5–7.7)
NEUTROPHILS NFR BLD: 81 %
NEUTS BAND NFR BLD: 2 %
NEUTS HYPERSEG # BLD: 9.05 X10(3) UL (ref 1.5–7.7)
PLATELET # BLD AUTO: 101 10(3)UL (ref 150–450)
PLATELET MORPHOLOGY: NORMAL
RBC # BLD AUTO: 2.39 X10(6)UL
TOTAL CELLS COUNTED BLD: 100
VARIANT LYMPHS NFR BLD MANUAL: 1 %
WBC # BLD AUTO: 10.9 X10(3) UL (ref 4–11)

## 2021-12-31 PROCEDURE — 85007 BL SMEAR W/DIFF WBC COUNT: CPT | Performed by: INTERNAL MEDICINE

## 2021-12-31 PROCEDURE — 85027 COMPLETE CBC AUTOMATED: CPT | Performed by: INTERNAL MEDICINE

## 2022-01-10 ENCOUNTER — LAB REQUISITION (OUTPATIENT)
Dept: LAB | Facility: HOSPITAL | Age: 87
End: 2022-01-10
Payer: MEDICARE

## 2022-01-10 DIAGNOSIS — E87.6 HYPOKALEMIA: ICD-10-CM

## 2022-01-10 LAB
ANION GAP SERPL CALC-SCNC: 9 MMOL/L (ref 0–18)
BASOPHILS # BLD: 0 X10(3) UL (ref 0–0.2)
BASOPHILS NFR BLD: 0 %
BUN BLD-MCNC: 58 MG/DL (ref 7–18)
BUN/CREAT SERPL: 34.7 (ref 10–20)
CALCIUM BLD-MCNC: 7.8 MG/DL (ref 8.5–10.1)
CHLORIDE SERPL-SCNC: 107 MMOL/L (ref 98–112)
CO2 SERPL-SCNC: 25 MMOL/L (ref 21–32)
CREAT BLD-MCNC: 1.67 MG/DL
DEPRECATED RDW RBC AUTO: 74.5 FL (ref 35.1–46.3)
EOSINOPHIL # BLD: 0 X10(3) UL (ref 0–0.7)
EOSINOPHIL NFR BLD: 0 %
ERYTHROCYTE [DISTWIDTH] IN BLOOD BY AUTOMATED COUNT: 19.8 % (ref 11–15)
GLUCOSE BLD-MCNC: 106 MG/DL (ref 70–99)
HCT VFR BLD AUTO: 28 %
HGB BLD-MCNC: 9.1 G/DL
LYMPHOCYTES NFR BLD: 0.48 X10(3) UL (ref 1–4)
LYMPHOCYTES NFR BLD: 1 %
MCH RBC QN AUTO: 33 PG (ref 26–34)
MCHC RBC AUTO-ENTMCNC: 32.5 G/DL (ref 31–37)
MCV RBC AUTO: 101.4 FL
MONOCYTES # BLD: 0 X10(3) UL (ref 0.1–1)
MONOCYTES NFR BLD: 0 %
NEUTROPHILS # BLD AUTO: 43.21 X10 (3) UL (ref 1.5–7.7)
NEUTROPHILS NFR BLD: 97 %
NEUTS BAND NFR BLD: 2 %
NEUTS HYPERSEG # BLD: 47.32 X10(3) UL (ref 1.5–7.7)
OSMOLALITY SERPL CALC.SUM OF ELEC: 309 MOSM/KG (ref 275–295)
PLATELET # BLD AUTO: 182 10(3)UL (ref 150–450)
PLATELET MORPHOLOGY: NORMAL
POTASSIUM SERPL-SCNC: 4.2 MMOL/L (ref 3.5–5.1)
RBC # BLD AUTO: 2.76 X10(6)UL
SODIUM SERPL-SCNC: 141 MMOL/L (ref 136–145)
TOTAL CELLS COUNTED BLD: 100
WBC # BLD AUTO: 47.8 X10(3) UL (ref 4–11)

## 2022-01-10 PROCEDURE — 80048 BASIC METABOLIC PNL TOTAL CA: CPT | Performed by: INTERNAL MEDICINE

## 2022-01-10 PROCEDURE — 85027 COMPLETE CBC AUTOMATED: CPT | Performed by: INTERNAL MEDICINE

## 2022-01-10 PROCEDURE — 85007 BL SMEAR W/DIFF WBC COUNT: CPT | Performed by: INTERNAL MEDICINE

## 2022-01-21 ENCOUNTER — HOSPITAL ENCOUNTER (OUTPATIENT)
Dept: CT IMAGING | Facility: HOSPITAL | Age: 87
Discharge: HOME | End: 2022-01-21
Attending: INTERNAL MEDICINE
Payer: MEDICARE

## 2022-01-21 DIAGNOSIS — C78.6 CARCINOMATOSIS PERITONEI (HCC): ICD-10-CM

## 2022-01-21 DIAGNOSIS — D64.9 ANEMIA, UNSPECIFIED TYPE: ICD-10-CM

## 2022-01-21 DIAGNOSIS — C78.6 PERITONEAL CARCINOMATOSIS (HCC): ICD-10-CM

## 2022-01-21 PROCEDURE — 71260 CT THORAX DX C+: CPT | Performed by: INTERNAL MEDICINE

## 2022-01-21 PROCEDURE — 74177 CT ABD & PELVIS W/CONTRAST: CPT | Performed by: INTERNAL MEDICINE

## 2022-01-24 ENCOUNTER — HOSPITAL ENCOUNTER (OUTPATIENT)
Dept: ULTRASOUND IMAGING | Facility: HOSPITAL | Age: 87
Discharge: HOME | End: 2022-01-24
Attending: PHYSICIAN ASSISTANT
Payer: MEDICARE

## 2022-01-24 DIAGNOSIS — C48.2 PRIMARY PERITONEAL ADENOCARCINOMA (HCC): ICD-10-CM

## 2022-01-24 DIAGNOSIS — R97.1 ELEVATED CA-125: ICD-10-CM

## 2022-01-24 PROCEDURE — 76856 US EXAM PELVIC COMPLETE: CPT | Performed by: PHYSICIAN ASSISTANT

## 2022-01-24 PROCEDURE — 76830 TRANSVAGINAL US NON-OB: CPT | Performed by: PHYSICIAN ASSISTANT

## 2022-02-08 PROBLEM — D70.9 NEUTROPENIA, UNSPECIFIED TYPE (HCC): Status: RESOLVED | Noted: 2021-11-29 | Resolved: 2022-02-08

## 2022-02-08 PROBLEM — D61.818 PANCYTOPENIA (HCC): Status: RESOLVED | Noted: 2021-11-29 | Resolved: 2022-02-08

## 2022-02-08 NOTE — PAT NURSING NOTE
Notified Lito at Dr. Austin Mcconnell office that last CBC , WBC's high. Per Lito , pt. Is seeing PCP today for clearance and if CBC not repeated there , she will have order entered to repeat.

## 2022-02-09 NOTE — PAT NURSING NOTE
Message left with Niki at Dr. Lito Martinez office re repeat CBC hg/hct. She will forward information to Brina Adam at Dr. Lito Martinez office.

## 2022-02-12 ENCOUNTER — LAB ENCOUNTER (OUTPATIENT)
Dept: LAB | Facility: HOSPITAL | Age: 87
DRG: 737 | End: 2022-02-12
Attending: OBSTETRICS & GYNECOLOGY
Payer: MEDICARE

## 2022-02-12 DIAGNOSIS — Z01.818 PREOP TESTING: ICD-10-CM

## 2022-02-12 LAB
ANTIBODY SCREEN: NEGATIVE
RH BLOOD TYPE: POSITIVE
SARS-COV-2 RNA RESP QL NAA+PROBE: NOT DETECTED

## 2022-02-12 PROCEDURE — 86900 BLOOD TYPING SEROLOGIC ABO: CPT

## 2022-02-12 PROCEDURE — 36415 COLL VENOUS BLD VENIPUNCTURE: CPT

## 2022-02-12 PROCEDURE — 86850 RBC ANTIBODY SCREEN: CPT

## 2022-02-12 PROCEDURE — 86901 BLOOD TYPING SEROLOGIC RH(D): CPT

## 2022-02-14 ENCOUNTER — ANESTHESIA EVENT (OUTPATIENT)
Dept: SURGERY | Facility: HOSPITAL | Age: 87
DRG: 737 | End: 2022-02-14
Payer: MEDICARE

## 2022-02-15 ENCOUNTER — HOSPITAL ENCOUNTER (INPATIENT)
Facility: HOSPITAL | Age: 87
LOS: 4 days | Discharge: NURSING, REHAB OR LONG-TERM CARE | DRG: 737 | End: 2022-02-19
Attending: OBSTETRICS & GYNECOLOGY | Admitting: OBSTETRICS & GYNECOLOGY
Payer: MEDICARE

## 2022-02-15 ENCOUNTER — ANESTHESIA (OUTPATIENT)
Dept: SURGERY | Facility: HOSPITAL | Age: 87
DRG: 737 | End: 2022-02-15
Payer: MEDICARE

## 2022-02-15 DIAGNOSIS — Z01.818 PREOP TESTING: Primary | ICD-10-CM

## 2022-02-15 DIAGNOSIS — C48.2 PRIMARY PERITONEAL ADENOCARCINOMA (HCC): ICD-10-CM

## 2022-02-15 DIAGNOSIS — C56.9 MALIGNANT NEOPLASM OF OVARY, UNSPECIFIED LATERALITY (HCC): ICD-10-CM

## 2022-02-15 DIAGNOSIS — R97.1 ELEVATED CA-125: ICD-10-CM

## 2022-02-15 PROCEDURE — 88307 TISSUE EXAM BY PATHOLOGIST: CPT | Performed by: OBSTETRICS & GYNECOLOGY

## 2022-02-15 PROCEDURE — 0UT24ZZ RESECTION OF BILATERAL OVARIES, PERCUTANEOUS ENDOSCOPIC APPROACH: ICD-10-PCS | Performed by: OBSTETRICS & GYNECOLOGY

## 2022-02-15 PROCEDURE — 0DTU4ZZ RESECTION OF OMENTUM, PERCUTANEOUS ENDOSCOPIC APPROACH: ICD-10-PCS | Performed by: OBSTETRICS & GYNECOLOGY

## 2022-02-15 PROCEDURE — 88341 IMHCHEM/IMCYTCHM EA ADD ANTB: CPT | Performed by: OBSTETRICS & GYNECOLOGY

## 2022-02-15 PROCEDURE — 8E0W4CZ ROBOTIC ASSISTED PROCEDURE OF TRUNK REGION, PERCUTANEOUS ENDOSCOPIC APPROACH: ICD-10-PCS | Performed by: OBSTETRICS & GYNECOLOGY

## 2022-02-15 PROCEDURE — 88309 TISSUE EXAM BY PATHOLOGIST: CPT | Performed by: OBSTETRICS & GYNECOLOGY

## 2022-02-15 PROCEDURE — 0UT74ZZ RESECTION OF BILATERAL FALLOPIAN TUBES, PERCUTANEOUS ENDOSCOPIC APPROACH: ICD-10-PCS | Performed by: OBSTETRICS & GYNECOLOGY

## 2022-02-15 PROCEDURE — 0WBF4ZZ EXCISION OF ABDOMINAL WALL, PERCUTANEOUS ENDOSCOPIC APPROACH: ICD-10-PCS | Performed by: OBSTETRICS & GYNECOLOGY

## 2022-02-15 PROCEDURE — 0UT94ZZ RESECTION OF UTERUS, PERCUTANEOUS ENDOSCOPIC APPROACH: ICD-10-PCS | Performed by: OBSTETRICS & GYNECOLOGY

## 2022-02-15 PROCEDURE — 0DB84ZZ EXCISION OF SMALL INTESTINE, PERCUTANEOUS ENDOSCOPIC APPROACH: ICD-10-PCS | Performed by: OBSTETRICS & GYNECOLOGY

## 2022-02-15 PROCEDURE — 88342 IMHCHEM/IMCYTCHM 1ST ANTB: CPT | Performed by: OBSTETRICS & GYNECOLOGY

## 2022-02-15 PROCEDURE — 0DBN4ZZ EXCISION OF SIGMOID COLON, PERCUTANEOUS ENDOSCOPIC APPROACH: ICD-10-PCS | Performed by: OBSTETRICS & GYNECOLOGY

## 2022-02-15 PROCEDURE — 88305 TISSUE EXAM BY PATHOLOGIST: CPT | Performed by: OBSTETRICS & GYNECOLOGY

## 2022-02-15 PROCEDURE — 0DBW4ZZ EXCISION OF PERITONEUM, PERCUTANEOUS ENDOSCOPIC APPROACH: ICD-10-PCS | Performed by: OBSTETRICS & GYNECOLOGY

## 2022-02-15 PROCEDURE — 07BC4ZX EXCISION OF PELVIS LYMPHATIC, PERCUTANEOUS ENDOSCOPIC APPROACH, DIAGNOSTIC: ICD-10-PCS | Performed by: OBSTETRICS & GYNECOLOGY

## 2022-02-15 PROCEDURE — 88311 DECALCIFY TISSUE: CPT | Performed by: OBSTETRICS & GYNECOLOGY

## 2022-02-15 RX ORDER — ONDANSETRON 2 MG/ML
INJECTION INTRAMUSCULAR; INTRAVENOUS AS NEEDED
Status: DISCONTINUED | OUTPATIENT
Start: 2022-02-15 | End: 2022-02-15 | Stop reason: SURG

## 2022-02-15 RX ORDER — PROCHLORPERAZINE EDISYLATE 5 MG/ML
5 INJECTION INTRAMUSCULAR; INTRAVENOUS ONCE AS NEEDED
Status: DISCONTINUED | OUTPATIENT
Start: 2022-02-15 | End: 2022-02-15 | Stop reason: HOSPADM

## 2022-02-15 RX ORDER — SODIUM CHLORIDE, SODIUM LACTATE, POTASSIUM CHLORIDE, CALCIUM CHLORIDE 600; 310; 30; 20 MG/100ML; MG/100ML; MG/100ML; MG/100ML
INJECTION, SOLUTION INTRAVENOUS CONTINUOUS
Status: DISCONTINUED | OUTPATIENT
Start: 2022-02-15 | End: 2022-02-16

## 2022-02-15 RX ORDER — ONDANSETRON 4 MG/1
4 TABLET, FILM COATED ORAL EVERY 8 HOURS PRN
Status: DISCONTINUED | OUTPATIENT
Start: 2022-02-15 | End: 2022-02-19

## 2022-02-15 RX ORDER — FAMOTIDINE 20 MG/1
20 TABLET, FILM COATED ORAL DAILY
Status: DISCONTINUED | OUTPATIENT
Start: 2022-02-16 | End: 2022-02-19

## 2022-02-15 RX ORDER — HYDROCODONE BITARTRATE AND ACETAMINOPHEN 5; 325 MG/1; MG/1
2 TABLET ORAL AS NEEDED
Status: DISCONTINUED | OUTPATIENT
Start: 2022-02-15 | End: 2022-02-15 | Stop reason: HOSPADM

## 2022-02-15 RX ORDER — METOPROLOL TARTRATE 5 MG/5ML
INJECTION INTRAVENOUS AS NEEDED
Status: DISCONTINUED | OUTPATIENT
Start: 2022-02-15 | End: 2022-02-15 | Stop reason: SURG

## 2022-02-15 RX ORDER — LEVOTHYROXINE SODIUM 0.07 MG/1
75 TABLET ORAL
Status: DISCONTINUED | OUTPATIENT
Start: 2022-02-16 | End: 2022-02-19

## 2022-02-15 RX ORDER — HALOPERIDOL 5 MG/ML
0.25 INJECTION INTRAMUSCULAR ONCE AS NEEDED
Status: DISCONTINUED | OUTPATIENT
Start: 2022-02-15 | End: 2022-02-15 | Stop reason: HOSPADM

## 2022-02-15 RX ORDER — CEFAZOLIN SODIUM/WATER 2 G/20 ML
2 SYRINGE (ML) INTRAVENOUS ONCE
Status: COMPLETED | OUTPATIENT
Start: 2022-02-15 | End: 2022-02-15

## 2022-02-15 RX ORDER — ONDANSETRON 2 MG/ML
4 INJECTION INTRAMUSCULAR; INTRAVENOUS ONCE AS NEEDED
Status: DISCONTINUED | OUTPATIENT
Start: 2022-02-15 | End: 2022-02-15 | Stop reason: HOSPADM

## 2022-02-15 RX ORDER — ROCURONIUM BROMIDE 10 MG/ML
INJECTION, SOLUTION INTRAVENOUS AS NEEDED
Status: DISCONTINUED | OUTPATIENT
Start: 2022-02-15 | End: 2022-02-15 | Stop reason: SURG

## 2022-02-15 RX ORDER — DEXAMETHASONE SODIUM PHOSPHATE 4 MG/ML
VIAL (ML) INJECTION AS NEEDED
Status: DISCONTINUED | OUTPATIENT
Start: 2022-02-15 | End: 2022-02-15 | Stop reason: SURG

## 2022-02-15 RX ORDER — PHENYLEPHRINE HCL 10 MG/ML
VIAL (ML) INJECTION AS NEEDED
Status: DISCONTINUED | OUTPATIENT
Start: 2022-02-15 | End: 2022-02-15 | Stop reason: SURG

## 2022-02-15 RX ORDER — HYDROMORPHONE HYDROCHLORIDE 1 MG/ML
0.2 INJECTION, SOLUTION INTRAMUSCULAR; INTRAVENOUS; SUBCUTANEOUS EVERY 5 MIN PRN
Status: DISCONTINUED | OUTPATIENT
Start: 2022-02-15 | End: 2022-02-15 | Stop reason: HOSPADM

## 2022-02-15 RX ORDER — SODIUM CHLORIDE, SODIUM LACTATE, POTASSIUM CHLORIDE, CALCIUM CHLORIDE 600; 310; 30; 20 MG/100ML; MG/100ML; MG/100ML; MG/100ML
INJECTION, SOLUTION INTRAVENOUS CONTINUOUS
Status: DISCONTINUED | OUTPATIENT
Start: 2022-02-15 | End: 2022-02-15 | Stop reason: HOSPADM

## 2022-02-15 RX ORDER — HYDROMORPHONE HYDROCHLORIDE 1 MG/ML
0.2 INJECTION, SOLUTION INTRAMUSCULAR; INTRAVENOUS; SUBCUTANEOUS EVERY 2 HOUR PRN
Status: DISCONTINUED | OUTPATIENT
Start: 2022-02-15 | End: 2022-02-17

## 2022-02-15 RX ORDER — HYDROMORPHONE HYDROCHLORIDE 1 MG/ML
0.4 INJECTION, SOLUTION INTRAMUSCULAR; INTRAVENOUS; SUBCUTANEOUS EVERY 2 HOUR PRN
Status: DISCONTINUED | OUTPATIENT
Start: 2022-02-15 | End: 2022-02-16

## 2022-02-15 RX ORDER — HYDROCODONE BITARTRATE AND ACETAMINOPHEN 5; 325 MG/1; MG/1
1 TABLET ORAL AS NEEDED
Status: DISCONTINUED | OUTPATIENT
Start: 2022-02-15 | End: 2022-02-15 | Stop reason: HOSPADM

## 2022-02-15 RX ORDER — SODIUM CHLORIDE, SODIUM LACTATE, POTASSIUM CHLORIDE, CALCIUM CHLORIDE 600; 310; 30; 20 MG/100ML; MG/100ML; MG/100ML; MG/100ML
INJECTION, SOLUTION INTRAVENOUS CONTINUOUS PRN
Status: DISCONTINUED | OUTPATIENT
Start: 2022-02-15 | End: 2022-02-15 | Stop reason: SURG

## 2022-02-15 RX ORDER — MORPHINE SULFATE 4 MG/ML
2 INJECTION, SOLUTION INTRAMUSCULAR; INTRAVENOUS EVERY 10 MIN PRN
Status: DISCONTINUED | OUTPATIENT
Start: 2022-02-15 | End: 2022-02-15 | Stop reason: HOSPADM

## 2022-02-15 RX ORDER — ONDANSETRON 2 MG/ML
4 INJECTION INTRAMUSCULAR; INTRAVENOUS EVERY 8 HOURS PRN
Status: DISCONTINUED | OUTPATIENT
Start: 2022-02-15 | End: 2022-02-19

## 2022-02-15 RX ORDER — ACETAMINOPHEN 325 MG/1
650 TABLET ORAL EVERY 6 HOURS
Status: DISCONTINUED | OUTPATIENT
Start: 2022-02-15 | End: 2022-02-19

## 2022-02-15 RX ORDER — HYDROCODONE BITARTRATE AND ACETAMINOPHEN 5; 325 MG/1; MG/1
1 TABLET ORAL EVERY 6 HOURS PRN
Status: DISCONTINUED | OUTPATIENT
Start: 2022-02-15 | End: 2022-02-19

## 2022-02-15 RX ORDER — METOPROLOL TARTRATE 5 MG/5ML
2.5 INJECTION INTRAVENOUS ONCE
Status: DISCONTINUED | OUTPATIENT
Start: 2022-02-15 | End: 2022-02-15 | Stop reason: HOSPADM

## 2022-02-15 RX ORDER — ATORVASTATIN CALCIUM 10 MG/1
10 TABLET, FILM COATED ORAL NIGHTLY
Status: DISCONTINUED | OUTPATIENT
Start: 2022-02-15 | End: 2022-02-19

## 2022-02-15 RX ORDER — HYDROMORPHONE HYDROCHLORIDE 1 MG/ML
0.8 INJECTION, SOLUTION INTRAMUSCULAR; INTRAVENOUS; SUBCUTANEOUS EVERY 2 HOUR PRN
Status: DISCONTINUED | OUTPATIENT
Start: 2022-02-15 | End: 2022-02-16

## 2022-02-15 RX ORDER — MORPHINE SULFATE 4 MG/ML
4 INJECTION, SOLUTION INTRAMUSCULAR; INTRAVENOUS EVERY 10 MIN PRN
Status: DISCONTINUED | OUTPATIENT
Start: 2022-02-15 | End: 2022-02-15 | Stop reason: HOSPADM

## 2022-02-15 RX ORDER — CEFAZOLIN SODIUM/WATER 2 G/20 ML
2 SYRINGE (ML) INTRAVENOUS EVERY 8 HOURS
Status: COMPLETED | OUTPATIENT
Start: 2022-02-15 | End: 2022-02-16

## 2022-02-15 RX ORDER — HYDROMORPHONE HYDROCHLORIDE 1 MG/ML
0.4 INJECTION, SOLUTION INTRAMUSCULAR; INTRAVENOUS; SUBCUTANEOUS EVERY 5 MIN PRN
Status: DISCONTINUED | OUTPATIENT
Start: 2022-02-15 | End: 2022-02-15 | Stop reason: HOSPADM

## 2022-02-15 RX ORDER — LIDOCAINE HYDROCHLORIDE 10 MG/ML
INJECTION, SOLUTION EPIDURAL; INFILTRATION; INTRACAUDAL; PERINEURAL AS NEEDED
Status: DISCONTINUED | OUTPATIENT
Start: 2022-02-15 | End: 2022-02-15 | Stop reason: SURG

## 2022-02-15 RX ORDER — ENOXAPARIN SODIUM 100 MG/ML
40 INJECTION SUBCUTANEOUS DAILY
Status: DISCONTINUED | OUTPATIENT
Start: 2022-02-16 | End: 2022-02-16

## 2022-02-15 RX ORDER — BUPIVACAINE HYDROCHLORIDE 2.5 MG/ML
INJECTION, SOLUTION EPIDURAL; INFILTRATION; INTRACAUDAL AS NEEDED
Status: DISCONTINUED | OUTPATIENT
Start: 2022-02-15 | End: 2022-02-15 | Stop reason: HOSPADM

## 2022-02-15 RX ORDER — ACETAMINOPHEN 500 MG
1000 TABLET ORAL ONCE
Status: DISCONTINUED | OUTPATIENT
Start: 2022-02-15 | End: 2022-02-15 | Stop reason: HOSPADM

## 2022-02-15 RX ORDER — NALOXONE HYDROCHLORIDE 0.4 MG/ML
80 INJECTION, SOLUTION INTRAMUSCULAR; INTRAVENOUS; SUBCUTANEOUS AS NEEDED
Status: DISCONTINUED | OUTPATIENT
Start: 2022-02-15 | End: 2022-02-15 | Stop reason: HOSPADM

## 2022-02-15 RX ORDER — HYDROMORPHONE HYDROCHLORIDE 1 MG/ML
0.6 INJECTION, SOLUTION INTRAMUSCULAR; INTRAVENOUS; SUBCUTANEOUS EVERY 5 MIN PRN
Status: DISCONTINUED | OUTPATIENT
Start: 2022-02-15 | End: 2022-02-15 | Stop reason: HOSPADM

## 2022-02-15 RX ORDER — MORPHINE SULFATE 10 MG/ML
6 INJECTION, SOLUTION INTRAMUSCULAR; INTRAVENOUS EVERY 10 MIN PRN
Status: DISCONTINUED | OUTPATIENT
Start: 2022-02-15 | End: 2022-02-15 | Stop reason: HOSPADM

## 2022-02-15 RX ADMIN — SODIUM CHLORIDE, SODIUM LACTATE, POTASSIUM CHLORIDE, CALCIUM CHLORIDE: 600; 310; 30; 20 INJECTION, SOLUTION INTRAVENOUS at 08:05:00

## 2022-02-15 RX ADMIN — METOPROLOL TARTRATE 3 MG: 5 INJECTION INTRAVENOUS at 09:24:00

## 2022-02-15 RX ADMIN — SODIUM CHLORIDE, SODIUM LACTATE, POTASSIUM CHLORIDE, CALCIUM CHLORIDE: 600; 310; 30; 20 INJECTION, SOLUTION INTRAVENOUS at 10:44:00

## 2022-02-15 RX ADMIN — SODIUM CHLORIDE, SODIUM LACTATE, POTASSIUM CHLORIDE, CALCIUM CHLORIDE: 600; 310; 30; 20 INJECTION, SOLUTION INTRAVENOUS at 10:19:00

## 2022-02-15 RX ADMIN — SODIUM CHLORIDE, SODIUM LACTATE, POTASSIUM CHLORIDE, CALCIUM CHLORIDE: 600; 310; 30; 20 INJECTION, SOLUTION INTRAVENOUS at 10:49:00

## 2022-02-15 RX ADMIN — METOPROLOL TARTRATE 2 MG: 5 INJECTION INTRAVENOUS at 09:29:00

## 2022-02-15 RX ADMIN — CEFAZOLIN SODIUM/WATER 2 G: 2 G/20 ML SYRINGE (ML) INTRAVENOUS at 08:14:00

## 2022-02-15 RX ADMIN — PHENYLEPHRINE HCL 100 MCG: 10 MG/ML VIAL (ML) INJECTION at 08:25:00

## 2022-02-15 RX ADMIN — PHENYLEPHRINE HCL 100 MCG: 10 MG/ML VIAL (ML) INJECTION at 08:19:00

## 2022-02-15 RX ADMIN — PHENYLEPHRINE HCL 50 MCG: 10 MG/ML VIAL (ML) INJECTION at 08:40:00

## 2022-02-15 RX ADMIN — LIDOCAINE HYDROCHLORIDE 50 MG: 10 INJECTION, SOLUTION EPIDURAL; INFILTRATION; INTRACAUDAL; PERINEURAL at 08:02:00

## 2022-02-15 RX ADMIN — PHENYLEPHRINE HCL 100 MCG: 10 MG/ML VIAL (ML) INJECTION at 08:28:00

## 2022-02-15 RX ADMIN — ONDANSETRON 4 MG: 2 INJECTION INTRAMUSCULAR; INTRAVENOUS at 10:32:00

## 2022-02-15 RX ADMIN — SODIUM CHLORIDE, SODIUM LACTATE, POTASSIUM CHLORIDE, CALCIUM CHLORIDE: 600; 310; 30; 20 INJECTION, SOLUTION INTRAVENOUS at 08:37:00

## 2022-02-15 RX ADMIN — PHENYLEPHRINE HCL 100 MCG: 10 MG/ML VIAL (ML) INJECTION at 08:11:00

## 2022-02-15 RX ADMIN — DEXAMETHASONE SODIUM PHOSPHATE 4 MG: 4 MG/ML VIAL (ML) INJECTION at 08:23:00

## 2022-02-15 RX ADMIN — ROCURONIUM BROMIDE 50 MG: 10 INJECTION, SOLUTION INTRAVENOUS at 08:02:00

## 2022-02-15 NOTE — BRIEF OP NOTE
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 9]Primary peritoneal adenocarcinoma ([address] Lily.Bicker. 2]Elevated CA-125 [R97.1]      Procedure Performed:   XI ROBOT-ASSISTED TOTAL LAPAROSCOPIC HYSTERECTOMY BILATERAL SALPINGO-OOPHORECTOMY, DEBULKING, OMENTECTOMY, STAGING    Surgeon(s) and Role:     Ruma Davila MD - Primary    Assistant(s):  Surgical Assistant.: Ese Peter CSA     Surgical Findings: Diffuse peritoneal disease over SB, omentum; disease involving ovaries, uterus and posterior culd de sac     Specimen: submitted     Estimated Blood Loss: Blood Output: 100 mL (2/15/2022 10:36 AM)      Dictation Number:  TBD    Catherine Jameson MD  2/15/2022  10:56 AM

## 2022-02-15 NOTE — ANESTHESIA PROCEDURE NOTES
Peripheral IV  Date/Time: 2/15/2022 8:05 AM  Inserted by: Hector Foley MD    Placement  Needle size: 18 G  Laterality: right  Location: forearm  Local anesthetic: none  Site prep: chlorhexidine  Technique: anatomical landmarks  Attempts: 1

## 2022-02-15 NOTE — ANESTHESIA PROCEDURE NOTES
Airway  Date/Time: 2/15/2022 8:03 AM  Urgency: Elective    Airway not difficult    General Information and Staff    Patient location during procedure: OR  Anesthesiologist: Joon Guerrero MD  Resident/CRNA: Sukh Cifuentes CRNA  Performed: CRNA     Indications and Patient Condition  Indications for airway management: anesthesia  Sedation level: deep  Preoxygenated: yes  Patient position: sniffing  Mask difficulty assessment: 1 - vent by mask    Final Airway Details  Final airway type: endotracheal airway      Successful airway: ETT  Cuffed: yes   Successful intubation technique: direct laryngoscopy  Endotracheal tube insertion site: oral  Blade: Lizett  Blade size: #3  ETT size (mm): 7.0    Cormack-Lehane Classification: grade IIA - partial view of glottis  Placement verified by: chest auscultation and capnometry   Measured from: teeth  Number of attempts at approach: 1  Number of other approaches attempted: 0    Additional Comments  Atraumatic. Lips, tongue and all teeth in preop  Condition.

## 2022-02-15 NOTE — CM/SW NOTE
MDO to KRISTOPHER for discharge planning. Per chart review, pt resides alone at [facility] IL[facility] and will need SNF at discharge. SW uploaded referral and requested DON screen. SW to forward PT/OT recommendations when available. Plan:  DC to [facility] SNF pending medical clearance. SW/CM to remain available for support and/or discharge planning.      Seth Donovan, NORMAN, [facility]   KFA:#[id number]

## 2022-02-16 PROBLEM — Z01.818 PREOP TESTING: Status: ACTIVE | Noted: 2022-02-16

## 2022-02-16 LAB
ANION GAP SERPL CALC-SCNC: 10 MMOL/L (ref 0–18)
BUN BLD-MCNC: 38 MG/DL (ref 7–18)
CALCIUM BLD-MCNC: 8.5 MG/DL (ref 8.5–10.1)
CHLORIDE SERPL-SCNC: 106 MMOL/L (ref 98–112)
CO2 SERPL-SCNC: 26 MMOL/L (ref 21–32)
CREAT BLD-MCNC: 1.79 MG/DL
DEPRECATED RDW RBC AUTO: 66 FL (ref 35.1–46.3)
ERYTHROCYTE [DISTWIDTH] IN BLOOD BY AUTOMATED COUNT: 16.5 % (ref 11–15)
GLUCOSE BLD-MCNC: 105 MG/DL (ref 70–99)
HCT VFR BLD AUTO: 24.1 %
HGB BLD-MCNC: 7.7 G/DL
MCH RBC QN AUTO: 35 PG (ref 26–34)
MCHC RBC AUTO-ENTMCNC: 32 G/DL (ref 31–37)
MCV RBC AUTO: 109.5 FL
OSMOLALITY SERPL CALC.SUM OF ELEC: 303 MOSM/KG (ref 275–295)
PLATELET # BLD AUTO: 221 10(3)UL (ref 150–450)
POTASSIUM SERPL-SCNC: 4.6 MMOL/L (ref 3.5–5.1)
RBC # BLD AUTO: 2.2 X10(6)UL
SODIUM SERPL-SCNC: 142 MMOL/L (ref 136–145)
WBC # BLD AUTO: 9.4 X10(3) UL (ref 4–11)

## 2022-02-16 PROCEDURE — 97535 SELF CARE MNGMENT TRAINING: CPT

## 2022-02-16 PROCEDURE — 80048 BASIC METABOLIC PNL TOTAL CA: CPT | Performed by: OBSTETRICS & GYNECOLOGY

## 2022-02-16 PROCEDURE — 97166 OT EVAL MOD COMPLEX 45 MIN: CPT

## 2022-02-16 PROCEDURE — 85027 COMPLETE CBC AUTOMATED: CPT | Performed by: OBSTETRICS & GYNECOLOGY

## 2022-02-16 PROCEDURE — 97530 THERAPEUTIC ACTIVITIES: CPT

## 2022-02-16 PROCEDURE — 97162 PT EVAL MOD COMPLEX 30 MIN: CPT

## 2022-02-16 RX ORDER — HEPARIN SODIUM 5000 [USP'U]/ML
5000 INJECTION, SOLUTION INTRAVENOUS; SUBCUTANEOUS EVERY 12 HOURS
Status: DISCONTINUED | OUTPATIENT
Start: 2022-02-17 | End: 2022-02-19

## 2022-02-16 RX ORDER — TRAMADOL HYDROCHLORIDE 50 MG/1
50 TABLET ORAL EVERY 12 HOURS PRN
Status: DISCONTINUED | OUTPATIENT
Start: 2022-02-16 | End: 2022-02-19

## 2022-02-16 RX ORDER — POLYETHYLENE GLYCOL 3350 17 G/17G
17 POWDER, FOR SOLUTION ORAL DAILY PRN
Status: DISCONTINUED | OUTPATIENT
Start: 2022-02-16 | End: 2022-02-19

## 2022-02-16 RX ORDER — BISACODYL 10 MG
10 SUPPOSITORY, RECTAL RECTAL
Status: DISCONTINUED | OUTPATIENT
Start: 2022-02-16 | End: 2022-02-19

## 2022-02-16 NOTE — PROGRESS NOTES
[facility] Pharmacy Note:  Renal Dose Adjustment for enoxaparin (LOVENOX)    Xena Rehman has been prescribed enoxaparin 40 mg subcutaneously every 24 hours. Estimated Creatinine Clearance: 15 mL/min (A) (based on SCr of 1.79 mg/dL (H)). Calculated CrCl less than 20 mL/min so the dose of Enoxaparin (LOVENOX) has been changed to heparin 500 mg twice a day per P&T approved protocol. Pharmacy will continue to follow, and make additional adjustments if needed.       Thank you,  Nasim Lestre, PharmD  2/16/2022 11:02 AM

## 2022-02-16 NOTE — OPERATIVE REPORT
[facility]    PATIENT'S NAME: Rayna Miranda   ATTENDING PHYSICIAN: Sterling Lockett MD   OPERATING PHYSICIAN: Sterling Lockett MD   PATIENT ACCOUNT#:   [id number]    LOCATION:   ROOM 1 A [facility]  MEDICAL RECORD #:   [id number]       YOB: 1931  ADMISSION DATE:       02/15/2022      OPERATION DATE:  02/15/2022    OPERATIVE REPORT    PREOPERATIVE DIAGNOSIS:    1. 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 2.   Elevated CA-125.  3.   Status post neoadjuvant chemotherapy x6 cycles. POSTOPERATIVE DIAGNOSIS:    1. 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 2.   Elevated CA-125.  3.   Status post neoadjuvant chemotherapy x6 cycles. 4.   Pending final pathology. PROCEDURE:  Robotic-assisted total laparoscopic hysterectomy and bilateral salpingo-oophorectomy, tumor debulking, bilateral pelvic lymph node dissection, omentectomy. ASSISTANT:  Ariel Martinez CSA    INTRAVENOUS FLUIDS:  1 liter. URINE OUTPUT:  150 mL. ESTIMATED BLOOD LOSS:  100 mL. DRAINS:  Parisi catheter. COMPLICATIONS:  None. CONDITION:  Stable to recovery room. INDICATIONS:  This is a 58-year-old female who presented with bloating and ascites in August 2021. 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 Her CA-125 was initially approximately 2500 and then she was started on chemotherapy with carboplatin, Taxol, and Avastin and has received 6 cycles. The Avastin was held on the last cycle. I saw this patient after 5 cycles and repeated her scan and saw that her CA-125 had improved down to 30 and the peritoneal disease had also improved. We discussed with the patient options of continuing chemotherapy versus a nonradical surgical approach to remove any large burden of disease. The patient is 80years old but has good functional status and wanted to be aggressive in her treatment. She was counseled on risks, benefits, alternatives, and indications. Informed consent was obtained. She underwent preoperative medical and cardiac clearance and was cleared for surgery. FINDINGS:  Upon laparoscopic evaluation, there was no ascites. There was some small volume, less than 1 cm, disease that was seen on the omentum. There were also innumerous nodules that were seen on the small-bowel surface that were all in the subcentimeter region. The liver, gallbladder, and both diaphragm and stomach appeared without evidence of disease. The appendix had no obvious disease. In the pelvis, there was scarring of the posterior cul-de-sac and the rectosigmoid colon was stuck to the posterior aspect of the uterus. There was obvious disease in this area that had responded to chemotherapy. There was also some surface disease involving both ovaries and potentially some surface disease on the uterus. There were mildly to moderately enlarged pelvic lymph nodes that were excised. At the completion of the procedure, the patient still had innumerous subcentimeter nodules involving the surface of small-bowel but a nonradical tumor debulking had been accomplished and we were able to do it all through small incisions. OPERATIVE TECHNIQUE:  The patient was taken to the operating room. She was given general endotracheal tube intubation anesthesia. She was prepped and draped in the usual sterile fashion. A Parisi catheter was inserted. The patient is nulliparous and has a very small introitus. With some difficulty, I was able to place a small VCare. Pneumoperitoneum was created in the left upper quadrant area at [place] point. Initial pressure was 3 mmHg. Abdomen was insufflated with 3 L of CO2 gas. A 5 mm AirSeal port was placed. Position was confirmed. Robotic trocars were then placed approximately 3 cm above the umbilicus at the midline, 2 each 10 cm apart from each other on the left side, one 10 cm apart on the right side.   Using these various ports, we assessed the omentum. An infracolic omentectomy was then carried out using the EnSeal device. Once the omentum had been removed, it was placed in an 8 mm endobag that was recovered after the colpotomy. The patient was then placed in Trendelenburg position. The bowel was positioned out of the way. Robot was docked. Standard instrumentation applied. There was significant scarring of the posterior cul-de-sac. I meticulously dissected this off and took the rectosigmoid colon off the posterior aspect of the uterus and freed this area up. There was also some disease on the surface of the ovary and this was attached to the lateral pelvic sidewalls. The peritoneum between the round ligament and infundibulopelvic ligament on both sides was divided. Retroperitoneal spaces were opened. Ureters were identified. Infundibulopelvic ligaments were then skeletonized, cauterized and cut. Anterior leaf of the broad ligament was then taken down. Bladder was dissected off the pubocervical fascia. The posterior aspect of the broad ligament was then skeletonized. Uterine vessels were skeletonized, cauterized and cut. The peritoneum over the posterior aspect of the VCare was then skeletonized. The rectosigmoid colon had been dissected off and was out of harm's way. Colpotomy was then made posteriorly. Parametrium was taken down laterally and then colpotomy was carried out circumferentially. Specimen consisting of the uterus, cervix, and bilateral adnexa was placed in 15 mm endobag and with some gentle traction was brought out through the vagina. I then went on to resect some of the tumor that was over the rectosigmoid colon. This was again small volume disease but was easily attainable. Additionally, there was some tumor on the anterior abdominal wall that was also debulked. All these specimens were delivered through the vagina including the omentum. I opened up the pelvic sidewall.   There were some fairly prominent lymph nodes in this area, unclear if they were reactive versus malignant and, therefore, lymphadenectomy was carried out along the internal and external iliac vessels. Specimen was delivered through the vagina. At this point, the vagina was closed with 0 180 V-Loc sutures starting from the right lateral edge and working our way to the left. Transvaginal examination showed excellent closure of the vagina. Both ureters were seen retroperitoneally and were seen to be peristalsing at the completion of the procedure. There were various areas that had chemotherapy-type changes and increased vascularity. I placed Surgicel in the areas of pelvic lymph node dissection and also in the posterior cul-de-sac along with Adore. Adore was also sprinkled over the omental area and there was no ongoing bleeding seen but the hemostasis was good to very good but not excellent, there was no active bleeding. At this point, robotic instruments were removed. Robot was undocked. Camera was removed under direct visualization. AirSeal was used to deflate the abdomen. The incisions were closed with subcuticular 4-0 Vicryl suture along with Dermabond. Approximately 30 mL of Marcaine was injected uniformly over the 5 incision sites. The vagina was inspected. There was noted to be some bleeding along the left lateral vaginal wall. I brought this together with a 2-0 Vicryl suture. There was also a small laceration similar with a second-degree in the posterior fourchette. This was also closed with the same suture. This led to good hemostasis. I did pack the vagina with a 1-inch iodoform that was tied to the Parisi catheter. There was no ongoing bleeding at this point. I was present and scrubbed for the entire procedure. Monroe Sanabria CSA, served as assistant and was invaluable for providing traction, countertraction, exposure, and was essential for safety and efficiency of this procedure.   The patient was extubated and taken to the recovery room in satisfactory condition. Sponge, instrument, and needle counts were reported as correct x2.       Dictated By Kyung Chaves MD  d: 02/15/2022 11:24:51  t: 02/15/2022 15:55:16  Job [id number]  XW/    cc: Kyung Chaves MD

## 2022-02-17 LAB
ANION GAP SERPL CALC-SCNC: 7 MMOL/L (ref 0–18)
ANTIBODY SCREEN: NEGATIVE
BUN BLD-MCNC: 43 MG/DL (ref 7–18)
BUN/CREAT SERPL: 24 (ref 10–20)
CALCIUM BLD-MCNC: 8.3 MG/DL (ref 8.5–10.1)
CHLORIDE SERPL-SCNC: 106 MMOL/L (ref 98–112)
CO2 SERPL-SCNC: 25 MMOL/L (ref 21–32)
CREAT BLD-MCNC: 1.79 MG/DL
DEPRECATED RDW RBC AUTO: 66.7 FL (ref 35.1–46.3)
ERYTHROCYTE [DISTWIDTH] IN BLOOD BY AUTOMATED COUNT: 16.7 % (ref 11–15)
GLUCOSE BLD-MCNC: 82 MG/DL (ref 70–99)
HCT VFR BLD AUTO: 19.7 %
HGB BLD-MCNC: 6.2 G/DL
HGB BLD-MCNC: 8.1 G/DL
MCH RBC QN AUTO: 34.3 PG (ref 26–34)
MCHC RBC AUTO-ENTMCNC: 31.5 G/DL (ref 31–37)
MCV RBC AUTO: 108.8 FL
OSMOLALITY SERPL CALC.SUM OF ELEC: 296 MOSM/KG (ref 275–295)
PLATELET # BLD AUTO: 167 10(3)UL (ref 150–450)
POTASSIUM SERPL-SCNC: 3.9 MMOL/L (ref 3.5–5.1)
RBC # BLD AUTO: 1.81 X10(6)UL
RH BLOOD TYPE: POSITIVE
SODIUM SERPL-SCNC: 138 MMOL/L (ref 136–145)
WBC # BLD AUTO: 6.4 X10(3) UL (ref 4–11)

## 2022-02-17 PROCEDURE — 85027 COMPLETE CBC AUTOMATED: CPT | Performed by: OBSTETRICS & GYNECOLOGY

## 2022-02-17 PROCEDURE — 86900 BLOOD TYPING SEROLOGIC ABO: CPT | Performed by: HOSPITALIST

## 2022-02-17 PROCEDURE — 86850 RBC ANTIBODY SCREEN: CPT | Performed by: HOSPITALIST

## 2022-02-17 PROCEDURE — 30233N1 TRANSFUSION OF NONAUTOLOGOUS RED BLOOD CELLS INTO PERIPHERAL VEIN, PERCUTANEOUS APPROACH: ICD-10-PCS | Performed by: HOSPITALIST

## 2022-02-17 PROCEDURE — 86920 COMPATIBILITY TEST SPIN: CPT

## 2022-02-17 PROCEDURE — 36430 TRANSFUSION BLD/BLD COMPNT: CPT

## 2022-02-17 PROCEDURE — 86901 BLOOD TYPING SEROLOGIC RH(D): CPT | Performed by: HOSPITALIST

## 2022-02-17 PROCEDURE — 80048 BASIC METABOLIC PNL TOTAL CA: CPT | Performed by: OBSTETRICS & GYNECOLOGY

## 2022-02-17 PROCEDURE — 85018 HEMOGLOBIN: CPT | Performed by: HOSPITALIST

## 2022-02-17 RX ORDER — SIMETHICONE 80 MG
80 TABLET,CHEWABLE ORAL EVERY 6 HOURS PRN
Status: DISCONTINUED | OUTPATIENT
Start: 2022-02-17 | End: 2022-02-19

## 2022-02-17 RX ORDER — SODIUM CHLORIDE 9 MG/ML
INJECTION, SOLUTION INTRAVENOUS ONCE
Status: DISCONTINUED | OUTPATIENT
Start: 2022-02-17 | End: 2022-02-19

## 2022-02-17 NOTE — CDS QUERY
.To answer this query:   Click \"Edit\" button on the toolbar. 2. Type an \"X\" in the bracket for diagnosis(s) that apply. (You may also add additional clinical details as you feel necessary to substantiate your response.)  3. Click on \"SIGN\" TAB to complete response. Thank you. Bryson Riding Potential for Altered Heart Rate/Rhythm - Atrial Fibrillation  CLINICAL DOCUMENTATION CLARIFICATION FORM  Dear Doctor Te Sexton:   1815/2022 80year old female with PMH sig for A. fib, ovarian cancer,  peritoneal carcinomatosis, hypothyroidism, hypertension, hyperlipidemia, CKD stage III who presents for elective hysterectomy. On tele monitor: Atrial Fib     02/16/2022 Included in your Progress Note: HTN, Hyperlipidemia, A. fib  -Resume metoprolol with parameters, resume atorvastatin  -On apixaban at home, currently on hold given surgery, per surgery plan to hold Eliquis for 2 weeks due to high risk of bleeding, on ppx dosing with heparin  Clinical information in the patient's medical record (provided above) includes documentation of Atrial Fibrillation. For accurate ICD-10-CM code assignment to reflect severity of illness and risk of mortality,  PLEASE (X)  ALL DIAGNOSES THAT APPLY. SELECTION BY PROVIDER ONLY    (  x)  Paroxysmal Atrial Fibrillation    (  )  Persistent Atrial Fibrillation    (  )  Permanent Atrial Fibrillation    (  )  Chronic Atrial Fibrillation    (  )  Other Explanation, (please specify):________________    (  )  Unable to Determine (please comment): _____________          If you have any questions, please contact Clinical :  Parvez Peters RN at [phone]. Thank You.      THIS FORM IS A PERMANENT PART OF THE MEDICAL RECORD

## 2022-02-17 NOTE — PLAN OF CARE
Problem: Patient Centered Care  Goal: Patient preferences are identified and integrated in the patient's plan of care  Description: Interventions:  - What would you like us to know as we care for you?   - Provide timely, complete, and accurate information to patient/family  - Incorporate patient and family knowledge, values, beliefs, and cultural backgrounds into the planning and delivery of care  - Encourage patient/family to participate in care and decision-making at the level they choose  - Honor patient and family perspectives and choices  Outcome: Progressing     Problem: PAIN - ADULT  Goal: Verbalizes/displays adequate comfort level or patient's stated pain goal  Description: INTERVENTIONS:  - Encourage pt to monitor pain and request assistance  - Assess pain using appropriate pain scale  - Administer analgesics based on type and severity of pain and evaluate response  - Implement non-pharmacological measures as appropriate and evaluate response  - Consider cultural and social influences on pain and pain management  - Manage/alleviate anxiety  - Utilize distraction and/or relaxation techniques  - Monitor for opioid side effects  - Notify MD/LIP if interventions unsuccessful or patient reports new pain  - Anticipate increased pain with activity and pre-medicate as appropriate  Outcome: Progressing     Problem: RISK FOR INFECTION - ADULT  Goal: Absence of fever/infection during anticipated neutropenic period  Description: INTERVENTIONS  - Monitor WBC  - Administer growth factors as ordered  - Implement neutropenic guidelines  Outcome: Progressing     Problem: SAFETY ADULT - FALL  Goal: Free from fall injury  Description: INTERVENTIONS:  - Assess pt frequently for physical needs  - Identify cognitive and physical deficits and behaviors that affect risk of falls.   - [place] fall precautions as indicated by assessment.  - Educate pt/family on patient safety including physical limitations  - Instruct pt to call for assistance with activity based on assessment  - Modify environment to reduce risk of injury  - Provide assistive devices as appropriate  - Consider OT/PT consult to assist with strengthening/mobility  - Encourage toileting schedule  Outcome: Progressing     Problem: Altered Communication/Language Barrier  Goal: Patient/Family is able to understand and participate in their care  Description: Interventions:  - Assess communication ability and preferred communication style  - Implement communication aides and strategies  - Use visual cues when possible  - Listen attentively, be patient, do not interrupt  - Minimize distractions  - Allow time for understanding and response  - Establish method for patient to ask for assistance (call light)  - Provide an  as needed  - Communicate barriers and strategies to overcome with those who interact with patient  Outcome: Progressing     Alert and oriented x4, can be forgetful at times. Reports moderate pain located around lower abdomen. 1 unit of PRBC transfused, post hgb 8. 1. No calls from tele. Tolerating soft/low-fiber diet. Voiding freely via [address]. [facility]

## 2022-02-17 NOTE — CM/SW NOTE
SW uploaded current clinicals to PP via [employer]. PCS on will call for ambulance thru 2/19. Plan:  DC to [facility] SN[facility]    SW/CM to remain available for support and/or discharge planning.      NORMAN Guerrier, [facility]  Social Work   CXX:#[id number]

## 2022-02-18 LAB
ANION GAP SERPL CALC-SCNC: 6 MMOL/L (ref 0–18)
BUN BLD-MCNC: 37 MG/DL (ref 7–18)
BUN/CREAT SERPL: 23 (ref 10–20)
CALCIUM BLD-MCNC: 8.3 MG/DL (ref 8.5–10.1)
CHLORIDE SERPL-SCNC: 107 MMOL/L (ref 98–112)
CO2 SERPL-SCNC: 26 MMOL/L (ref 21–32)
CREAT BLD-MCNC: 1.61 MG/DL
DEPRECATED RDW RBC AUTO: 74.9 FL (ref 35.1–46.3)
ERYTHROCYTE [DISTWIDTH] IN BLOOD BY AUTOMATED COUNT: 19.7 % (ref 11–15)
GLUCOSE BLD-MCNC: 89 MG/DL (ref 70–99)
HCT VFR BLD AUTO: 25.3 %
HGB BLD-MCNC: 8 G/DL
MCH RBC QN AUTO: 32.9 PG (ref 26–34)
MCHC RBC AUTO-ENTMCNC: 31.6 G/DL (ref 31–37)
MCV RBC AUTO: 104.1 FL
OSMOLALITY SERPL CALC.SUM OF ELEC: 296 MOSM/KG (ref 275–295)
PLATELET # BLD AUTO: 165 10(3)UL (ref 150–450)
POTASSIUM SERPL-SCNC: 4.2 MMOL/L (ref 3.5–5.1)
RBC # BLD AUTO: 2.43 X10(6)UL
SODIUM SERPL-SCNC: 139 MMOL/L (ref 136–145)
WBC # BLD AUTO: 6.6 X10(3) UL (ref 4–11)

## 2022-02-18 PROCEDURE — 80048 BASIC METABOLIC PNL TOTAL CA: CPT | Performed by: OBSTETRICS & GYNECOLOGY

## 2022-02-18 PROCEDURE — 85027 COMPLETE CBC AUTOMATED: CPT | Performed by: OBSTETRICS & GYNECOLOGY

## 2022-02-18 PROCEDURE — 97530 THERAPEUTIC ACTIVITIES: CPT

## 2022-02-18 RX ORDER — ACETAMINOPHEN 325 MG/1
650 TABLET ORAL EVERY 6 HOURS
Qty: 1 TABLET | Refills: 0 | Status: SHIPPED | COMMUNITY
Start: 2022-02-18

## 2022-02-18 RX ORDER — GABAPENTIN 100 MG/1
100 CAPSULE ORAL 3 TIMES DAILY
Status: DISCONTINUED | OUTPATIENT
Start: 2022-02-18 | End: 2022-02-19

## 2022-02-18 RX ORDER — ASPIRIN 81 MG/1
81 TABLET ORAL 2 TIMES DAILY
Qty: 28 TABLET | Refills: 0 | Status: SHIPPED | COMMUNITY
Start: 2022-02-18 | End: 2022-02-19

## 2022-02-18 RX ORDER — MELATONIN
325
Qty: 1 TABLET | Refills: 0 | Status: SHIPPED | COMMUNITY
Start: 2022-02-18

## 2022-02-18 RX ORDER — FUROSEMIDE 20 MG/1
20 TABLET ORAL DAILY
Qty: 1 TABLET | Refills: 0 | Status: SHIPPED | COMMUNITY
Start: 2022-02-21

## 2022-02-18 NOTE — PLAN OF CARE
Problem: Patient Centered Care  Goal: Patient preferences are identified and integrated in the patient's plan of care  Description: Interventions:  - What would you like us to know as we care for you?   - Provide timely, complete, and accurate information to patient/family  - Incorporate patient and family knowledge, values, beliefs, and cultural backgrounds into the planning and delivery of care  - Encourage patient/family to participate in care and decision-making at the level they choose  - Honor patient and family perspectives and choices  Outcome: Progressing     Problem: PAIN - ADULT  Goal: Verbalizes/displays adequate comfort level or patient's stated pain goal  Description: INTERVENTIONS:  - Encourage pt to monitor pain and request assistance  - Assess pain using appropriate pain scale  - Administer analgesics based on type and severity of pain and evaluate response  - Implement non-pharmacological measures as appropriate and evaluate response  - Consider cultural and social influences on pain and pain management  - Manage/alleviate anxiety  - Utilize distraction and/or relaxation techniques  - Monitor for opioid side effects  - Notify MD/LIP if interventions unsuccessful or patient reports new pain  - Anticipate increased pain with activity and pre-medicate as appropriate  Outcome: Progressing     Problem: RISK FOR INFECTION - ADULT  Goal: Absence of fever/infection during anticipated neutropenic period  Description: INTERVENTIONS  - Monitor WBC  - Administer growth factors as ordered  - Implement neutropenic guidelines  Outcome: Progressing     Problem: SAFETY ADULT - FALL  Goal: Free from fall injury  Description: INTERVENTIONS:  - Assess pt frequently for physical needs  - Identify cognitive and physical deficits and behaviors that affect risk of falls.   - [place] fall precautions as indicated by assessment.  - Educate pt/family on patient safety including physical limitations  - Instruct pt to call for assistance with activity based on assessment  - Modify environment to reduce risk of injury  - Provide assistive devices as appropriate  - Consider OT/PT consult to assist with strengthening/mobility  - Encourage toileting schedule  Outcome: Progressing     Problem: Altered Communication/Language Barrier  Goal: Patient/Family is able to understand and participate in their care  Description: Interventions:  - Assess communication ability and preferred communication style  - Implement communication aides and strategies  - Use visual cues when possible  - Listen attentively, be patient, do not interrupt  - Minimize distractions  - Allow time for understanding and response  - Establish method for patient to ask for assistance (call light)  - Provide an  as needed  - Communicate barriers and strategies to overcome with those who interact with patient  Outcome: Progressing     Alert and oriented x3-4. Forgetful at times. Denies reports of pain. No calls from tele. Up x2 stand and pivot to rolling chair. Tolerating soft low-fiber diet. BM today. Incontinent, purewick in place. Hgb stable, patient to discharge to [facility] rehab pending bed availability.

## 2022-02-18 NOTE — CM/SW NOTE
KRISTOPHER notified that patient is cleared for DC. Notified Ramya at [facility]. Per Jose, bed will not be avail until tomorrow 2/19. Requested Liaison Jose if there is any way a bed would be avail today as patient is a resident of [address] [place] [address], and is medically stable for DC. Jose states that if a bed opens up sooner, will notify KRISTOPHER however most likely will be tomorrow morning. Tentatively will schedule transfer for 1pm tomorrow 2/19. If a bed opens open, will notify KRISTOPHER.     204pm  Discussed case with KRISTOPHER supervisor Mague Faith, as patient is medically cleared for DC and MD has requested case be escalated as patient is a resident of [facility]. Supervisor also spoke with Rosie who reports that bed will not be avail until tomorrow. Liaison will reach out if a bed becomes available. Called and notified Izzy Cruz of transfer to [facility] tomorrow at [address] understood and is agreeable. Notified by RN patient is appropriate for [address] transportation. 357pm  Notified RN and MD of [facility] request for rapid covid test.     PLAN: [facility] Saturday 2/19 at [address] scheduled for 1pm. PCS DONE. RN# for report is [phone] NEED A COVID TEST PRIOR TO TRANSFER.     Prabhu Meadows, MSW, [facility]    [id number]

## 2022-02-18 NOTE — PLAN OF CARE
Problem: Patient Centered Care  Goal: Patient preferences are identified and integrated in the patient's plan of care  Description: Interventions:  - What would you like us to know as we care for you? \"I live at [facility]"  - Provide timely, complete, and accurate information to patient/family  - Incorporate patient and family knowledge, values, beliefs, and cultural backgrounds into the planning and delivery of care  - Encourage patient/family to participate in care and decision-making at the level they choose  - Honor patient and family perspectives and choices  Outcome: Progressing     Problem: PAIN - ADULT  Goal: Verbalizes/displays adequate comfort level or patient's stated pain goal  Description: INTERVENTIONS:  - Encourage pt to monitor pain and request assistance  - Assess pain using appropriate pain scale  - Administer analgesics based on type and severity of pain and evaluate response  - Implement non-pharmacological measures as appropriate and evaluate response  - Consider cultural and social influences on pain and pain management  - Manage/alleviate anxiety  - Utilize distraction and/or relaxation techniques  - Monitor for opioid side effects  - Notify MD/LIP if interventions unsuccessful or patient reports new pain  - Anticipate increased pain with activity and pre-medicate as appropriate  Outcome: Progressing     Problem: RISK FOR INFECTION - ADULT  Goal: Absence of fever/infection during anticipated neutropenic period  Description: INTERVENTIONS  - Monitor WBC  - Administer growth factors as ordered  - Implement neutropenic guidelines  Outcome: Progressing     Problem: SAFETY ADULT - FALL  Goal: Free from fall injury  Description: INTERVENTIONS:  - Assess pt frequently for physical needs  - Identify cognitive and physical deficits and behaviors that affect risk of falls.   - [place] fall precautions as indicated by assessment.  - Educate pt/family on patient safety including physical limitations  - Instruct pt to call for assistance with activity based on assessment  - Modify environment to reduce risk of injury  - Provide assistive devices as appropriate  - Consider OT/PT consult to assist with strengthening/mobility  - Encourage toileting schedule  Outcome: Progressing     Problem: Altered Communication/Language Barrier  Goal: Patient/Family is able to understand and participate in their care  Description: Interventions:  - Assess communication ability and preferred communication style  - Implement communication aides and strategies  - Use visual cues when possible  - Listen attentively, be patient, do not interrupt  - Minimize distractions  - Allow time for understanding and response  - Establish method for patient to ask for assistance (call light)  - Provide an  as needed  - Communicate barriers and strategies to overcome with those who interact with patient  Outcome: Progressing     Patient denies pain. 2 pivot to bed and chair. Plan is for [employer] in am. High fall risk: bed low and in locked position, call light within reach, nonskid socks applied. Bed alarm on . VSS. Will continue to monitor.

## 2022-02-18 NOTE — PHYSICAL THERAPY NOTE
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 PT wore a surgical mask, gloves and goggles during the session. Pt did not wear a mask throughout the session. Pt was received in bed, agreeable for therapy. Pt c/o generalized pain in her feet and hands as well as burning in her perineal area (RN in to give pain meds during the session). Pt needed Mod A for supine->sit via log roll technique with use of bed rail and increased time to complete. Once sitting pt impulsively tried to stand up without the RW to get to the chair. With firm cueing pt was able to stand with Min A up to the RW and took 2 small steps to the chair with same amount of assist. Pt refused to ambulate further at this time due to fatigue. Educated pt on PT POC, safety, activity and d/c recs. Encouraged pt to get up more frequently with RN staff. Pt was left up in the chair with all needs in reach, nursing present and aware. The patient's Approx Degree of Impairment: 50.57% has been calculated based on documentation in the [facility] '6 clicks' Inpatient Basic Mobility Short Form. Research supports that patients with this level of impairment may benefit from KITTY. DISCHARGE RECOMMENDATIONS  PT Discharge Recommendations: Sub-acute rehabilitation     PLAN  PT Treatment Plan: Bed mobility; Endurance; Patient education;Gait training;Strengthening;Transfer training;Balance training    SUBJECTIVE  \"Will you just let me get over to that chair?!\"    OBJECTIVE  Precautions: Hard of hearing      PAIN ASSESSMENT   Rating:  (Did not rate)  Location: bilateral hands and feet  Management Techniques:  Activity promotion;Relaxation;Repositioning (RN made aware-in to give pain meds)    BALANCE  Static Sitting: Fair  Dynamic Sitting: Fair  Static Standing: Poor +  Dynamic Standing: Poor    ACTIVITY TOLERANCE   BP: (!) 164/89  BP Location: Left arm  BP Method: Automatic  Patient Position: Sitting    AM-PAC '6-Clicks' INPATIENT SHORT FORM - BASIC MOBILITY  How much difficulty does the patient currently have. .. Patient Difficulty: Turning over in bed (including adjusting bedclothes, sheets and blankets)?: A Little   Patient Difficulty: Sitting down on and standing up from a chair with arms (e.g., wheelchair, bedside commode, etc.): A Little   Patient Difficulty: Moving from lying on back to sitting on the side of the bed?: A Little   How much help from another person does the patient currently need. .. Help from Another: Moving to and from a bed to a chair (including a wheelchair)?: A Little   Help from Another: Need to walk in hospital room?: A Little   Help from Another: Climbing 3-5 steps with a railing?: A Lot     AM-PAC Score:  Raw Score: 17   Approx Degree of Impairment: 50.57%   Standardized Score (AM-PAC Scale): 42.13   CMS Modifier (G-Code): CK    FUNCTIONAL ABILITY STATUS  Functional Mobility/Gait Assessment  Gait Assistance: Minimum assistance  Distance (ft): 2  Assistive Device: Rolling walker  Pattern: Shuffle (flexed posture, impulsive, sat quickly, fatigued quickly)            Patient End of Session: Up in chair;Needs met;Call light within reach;RN aware of session/findings; All patient questions and concerns addressed    CURRENT GOALS   Goals to be met by: 2/25  Patient Goal Patient's [facility]stated [facility] Patient is able to demonstrate supine - sit EOB @ level: minimum assistance     Goal #1   Current Status Mod A   Goal #2 Patient is able to demonstrate transfers Sit to/from Stand at assistance level: supervision with walker - rolling     Goal #2  Current Status Min A   Goal #3 Patient is able to ambulate 50 feet with assist device: walker - rolling at assistance level: minimum assistance   Goal #3   Current Status X2' with RW, Min A           Goal #5 Patient to demonstrate independence with home activity/exercise instructions provided to patient in preparation for discharge.    Goal #5   Current Status In progress   Goal #6    Goal #6  Current Status

## 2022-02-19 VITALS
HEART RATE: 89 BPM | WEIGHT: 134 LBS | HEIGHT: 60 IN | RESPIRATION RATE: 18 BRPM | SYSTOLIC BLOOD PRESSURE: 140 MMHG | TEMPERATURE: 98 F | DIASTOLIC BLOOD PRESSURE: 86 MMHG | BODY MASS INDEX: 26.31 KG/M2 | OXYGEN SATURATION: 96 %

## 2022-02-19 LAB — SARS-COV-2 RNA RESP QL NAA+PROBE: NOT DETECTED

## 2022-02-19 RX ORDER — ASPIRIN 81 MG/1
81 TABLET ORAL 2 TIMES DAILY
Qty: 28 TABLET | Refills: 0 | Status: SHIPPED | COMMUNITY
Start: 2022-02-19

## 2022-02-19 NOTE — CM/SW NOTE
Plan: Discharge today at 1pm to [facility] set up by Ayo Christina test ordered and result uploaded in [address] [facility] (Negative)    / to remain available for support and/or discharge planning.      Dave CHAVIRAA MSN, RN CTL/  [id number]

## 2022-02-19 NOTE — PLAN OF CARE
Patient is resting in bed, A&O x4 at times can be forgetful, VSS, and pain is being managed with gabapentin. Tolerating lf/soft diet, no complaints of nausea. IV is saline locked. Tele in place. Up with walker and a stand pivot. Purewick in place and voiding. Incontinent at times of bowel. Bed is in lowest position, safety precautions are in place, and call light is within reach. Will continue to monitor and round frequently.      Problem: Patient Centered Care  Goal: Patient preferences are identified and integrated in the patient's plan of care  Description: Interventions:  - What would you like us to know as we care for you?  - Provide timely, complete, and accurate information to patient/family  - Incorporate patient and family knowledge, values, beliefs, and cultural backgrounds into the planning and delivery of care  - Encourage patient/family to participate in care and decision-making at the level they choose  - Honor patient and family perspectives and choices  Outcome: Progressing     Problem: PAIN - ADULT  Goal: Verbalizes/displays adequate comfort level or patient's stated pain goal  Description: INTERVENTIONS:  - Encourage pt to monitor pain and request assistance  - Assess pain using appropriate pain scale  - Administer analgesics based on type and severity of pain and evaluate response  - Implement non-pharmacological measures as appropriate and evaluate response  - Consider cultural and social influences on pain and pain management  - Manage/alleviate anxiety  - Utilize distraction and/or relaxation techniques  - Monitor for opioid side effects  - Notify MD/LIP if interventions unsuccessful or patient reports new pain  - Anticipate increased pain with activity and pre-medicate as appropriate  Outcome: Progressing     Problem: RISK FOR INFECTION - ADULT  Goal: Absence of fever/infection during anticipated neutropenic period  Description: INTERVENTIONS  - Monitor WBC  - Administer growth factors as ordered  - Implement neutropenic guidelines  Outcome: Progressing     Problem: SAFETY ADULT - FALL  Goal: Free from fall injury  Description: INTERVENTIONS:  - Assess pt frequently for physical needs  - Identify cognitive and physical deficits and behaviors that affect risk of falls.   - [place] fall precautions as indicated by assessment.  - Educate pt/family on patient safety including physical limitations  - Instruct pt to call for assistance with activity based on assessment  - Modify environment to reduce risk of injury  - Provide assistive devices as appropriate  - Consider OT/PT consult to assist with strengthening/mobility  - Encourage toileting schedule  Outcome: Progressing     Problem: Altered Communication/Language Barrier  Goal: Patient/Family is able to understand and participate in their care  Description: Interventions:  - Assess communication ability and preferred communication style  - Implement communication aides and strategies  - Use visual cues when possible  - Listen attentively, be patient, do not interrupt  - Minimize distractions  - Allow time for understanding and response  - Establish method for patient to ask for assistance (call light)  - Provide an  as needed  - Communicate barriers and strategies to overcome with those who interact with patient  Outcome: Progressing

## 2022-02-21 ENCOUNTER — SNF VISIT (OUTPATIENT)
Dept: INTERNAL MEDICINE CLINIC | Facility: SKILLED NURSING FACILITY | Age: 87
End: 2022-02-21

## 2022-02-21 PROCEDURE — 99309 SBSQ NF CARE MODERATE MDM 30: CPT | Performed by: NURSE PRACTITIONER

## 2022-02-21 PROCEDURE — 1123F ACP DISCUSS/DSCN MKR DOCD: CPT | Performed by: NURSE PRACTITIONER

## 2022-02-28 ENCOUNTER — SNF VISIT (OUTPATIENT)
Dept: INTERNAL MEDICINE CLINIC | Facility: SKILLED NURSING FACILITY | Age: 87
End: 2022-02-28

## 2022-02-28 PROCEDURE — 99309 SBSQ NF CARE MODERATE MDM 30: CPT | Performed by: NURSE PRACTITIONER

## 2022-03-02 ENCOUNTER — SNF VISIT (OUTPATIENT)
Dept: INTERNAL MEDICINE CLINIC | Facility: SKILLED NURSING FACILITY | Age: 87
End: 2022-03-02

## 2022-03-02 PROCEDURE — 99309 SBSQ NF CARE MODERATE MDM 30: CPT | Performed by: NURSE PRACTITIONER

## 2022-03-07 ENCOUNTER — SNF VISIT (OUTPATIENT)
Dept: INTERNAL MEDICINE CLINIC | Facility: SKILLED NURSING FACILITY | Age: 87
End: 2022-03-07

## 2022-03-07 PROCEDURE — 99309 SBSQ NF CARE MODERATE MDM 30: CPT | Performed by: NURSE PRACTITIONER

## 2022-03-14 ENCOUNTER — SNF VISIT (OUTPATIENT)
Dept: INTERNAL MEDICINE CLINIC | Facility: SKILLED NURSING FACILITY | Age: 87
End: 2022-03-14

## 2022-03-14 PROCEDURE — 99309 SBSQ NF CARE MODERATE MDM 30: CPT | Performed by: NURSE PRACTITIONER

## 2022-03-23 ENCOUNTER — SNF VISIT (OUTPATIENT)
Dept: INTERNAL MEDICINE CLINIC | Facility: SKILLED NURSING FACILITY | Age: 87
End: 2022-03-23

## 2022-03-23 PROCEDURE — 99308 SBSQ NF CARE LOW MDM 20: CPT | Performed by: NURSE PRACTITIONER

## 2022-04-04 ENCOUNTER — SNF VISIT (OUTPATIENT)
Dept: INTERNAL MEDICINE CLINIC | Facility: SKILLED NURSING FACILITY | Age: 87
End: 2022-04-04

## 2022-04-04 PROCEDURE — 99308 SBSQ NF CARE LOW MDM 20: CPT | Performed by: NURSE PRACTITIONER

## 2022-04-05 ENCOUNTER — LAB REQUISITION (OUTPATIENT)
Dept: LAB | Facility: HOSPITAL | Age: 87
End: 2022-04-05
Payer: MEDICARE

## 2022-04-05 LAB — C DIFF TOX B STL QL: NEGATIVE

## 2022-04-05 PROCEDURE — 87493 C DIFF AMPLIFIED PROBE: CPT | Performed by: INTERNAL MEDICINE

## 2022-04-06 ENCOUNTER — SNF VISIT (OUTPATIENT)
Dept: INTERNAL MEDICINE CLINIC | Facility: SKILLED NURSING FACILITY | Age: 87
End: 2022-04-06

## 2022-04-06 ENCOUNTER — LAB REQUISITION (OUTPATIENT)
Dept: LAB | Facility: HOSPITAL | Age: 87
End: 2022-04-06
Payer: MEDICARE

## 2022-04-06 LAB
ALBUMIN SERPL-MCNC: 2.4 G/DL (ref 3.4–5)
ALBUMIN/GLOB SERPL: 0.6 {RATIO} (ref 1–2)
ALP LIVER SERPL-CCNC: 131 U/L
ALT SERPL-CCNC: 18 U/L
ANION GAP SERPL CALC-SCNC: 6 MMOL/L (ref 0–18)
AST SERPL-CCNC: 13 U/L (ref 15–37)
BASOPHILS # BLD: 0 X10(3) UL (ref 0–0.2)
BASOPHILS NFR BLD: 0 %
BILIRUB SERPL-MCNC: 0.3 MG/DL (ref 0.1–2)
BUN BLD-MCNC: 44 MG/DL (ref 7–18)
BUN/CREAT SERPL: 28.4 (ref 10–20)
CALCIUM BLD-MCNC: 8.4 MG/DL (ref 8.5–10.1)
CHLORIDE SERPL-SCNC: 110 MMOL/L (ref 98–112)
CO2 SERPL-SCNC: 27 MMOL/L (ref 21–32)
CREAT BLD-MCNC: 1.55 MG/DL
DEPRECATED RDW RBC AUTO: 56.7 FL (ref 35.1–46.3)
EOSINOPHIL # BLD: 0.06 X10(3) UL (ref 0–0.7)
EOSINOPHIL NFR BLD: 1 %
ERYTHROCYTE [DISTWIDTH] IN BLOOD BY AUTOMATED COUNT: 15.2 % (ref 11–15)
GLOBULIN PLAS-MCNC: 3.8 G/DL (ref 2.8–4.4)
GLUCOSE BLD-MCNC: 93 MG/DL (ref 70–99)
HCT VFR BLD AUTO: 27.2 %
HGB BLD-MCNC: 8.5 G/DL
LYMPHOCYTES NFR BLD: 0.45 X10(3) UL (ref 1–4)
LYMPHOCYTES NFR BLD: 7 %
MCH RBC QN AUTO: 31.8 PG (ref 26–34)
MCHC RBC AUTO-ENTMCNC: 31.3 G/DL (ref 31–37)
MCV RBC AUTO: 101.9 FL
MONOCYTES # BLD: 0.58 X10(3) UL (ref 0.1–1)
MONOCYTES NFR BLD: 9 %
NEUTROPHILS # BLD AUTO: 4.63 X10 (3) UL (ref 1.5–7.7)
NEUTROPHILS NFR BLD: 76 %
NEUTS BAND NFR BLD: 7 %
NEUTS HYPERSEG # BLD: 5.31 X10(3) UL (ref 1.5–7.7)
OSMOLALITY SERPL CALC.SUM OF ELEC: 307 MOSM/KG (ref 275–295)
PLATELET # BLD AUTO: 70 10(3)UL (ref 150–450)
PLATELET MORPHOLOGY: NORMAL
POTASSIUM SERPL-SCNC: 4.3 MMOL/L (ref 3.5–5.1)
PROT SERPL-MCNC: 6.2 G/DL (ref 6.4–8.2)
RBC # BLD AUTO: 2.67 X10(6)UL
SODIUM SERPL-SCNC: 143 MMOL/L (ref 136–145)
TOTAL CELLS COUNTED BLD: 100
WBC # BLD AUTO: 6.4 X10(3) UL (ref 4–11)

## 2022-04-06 PROCEDURE — 85027 COMPLETE CBC AUTOMATED: CPT | Performed by: INTERNAL MEDICINE

## 2022-04-06 PROCEDURE — 85025 COMPLETE CBC W/AUTO DIFF WBC: CPT | Performed by: INTERNAL MEDICINE

## 2022-04-06 PROCEDURE — 99308 SBSQ NF CARE LOW MDM 20: CPT | Performed by: NURSE PRACTITIONER

## 2022-04-06 PROCEDURE — 85007 BL SMEAR W/DIFF WBC COUNT: CPT | Performed by: INTERNAL MEDICINE

## 2022-04-06 PROCEDURE — 80053 COMPREHEN METABOLIC PANEL: CPT | Performed by: INTERNAL MEDICINE

## 2022-04-08 ENCOUNTER — LAB REQUISITION (OUTPATIENT)
Dept: LAB | Facility: HOSPITAL | Age: 87
End: 2022-04-08
Payer: MEDICARE

## 2022-04-08 LAB
BASOPHILS # BLD AUTO: 0.01 X10(3) UL (ref 0–0.2)
BASOPHILS NFR BLD AUTO: 0.2 %
DEPRECATED RDW RBC AUTO: 57.1 FL (ref 35.1–46.3)
EOSINOPHIL # BLD AUTO: 0.06 X10(3) UL (ref 0–0.7)
EOSINOPHIL NFR BLD AUTO: 0.9 %
ERYTHROCYTE [DISTWIDTH] IN BLOOD BY AUTOMATED COUNT: 15.3 % (ref 11–15)
HCT VFR BLD AUTO: 24.6 %
HGB BLD-MCNC: 7.7 G/DL
IMM GRANULOCYTES # BLD AUTO: 0.07 X10(3) UL (ref 0–1)
IMM GRANULOCYTES NFR BLD: 1.1 %
LYMPHOCYTES # BLD AUTO: 0.84 X10(3) UL (ref 1–4)
LYMPHOCYTES NFR BLD AUTO: 13.3 %
MCH RBC QN AUTO: 31.8 PG (ref 26–34)
MCHC RBC AUTO-ENTMCNC: 31.3 G/DL (ref 31–37)
MCV RBC AUTO: 101.7 FL
MONOCYTES # BLD AUTO: 0.55 X10(3) UL (ref 0.1–1)
MONOCYTES NFR BLD AUTO: 8.7 %
NEUTROPHILS # BLD AUTO: 4.79 X10 (3) UL (ref 1.5–7.7)
NEUTROPHILS # BLD AUTO: 4.79 X10(3) UL (ref 1.5–7.7)
NEUTROPHILS NFR BLD AUTO: 75.8 %
PLATELET # BLD AUTO: 59 10(3)UL (ref 150–450)
RBC # BLD AUTO: 2.42 X10(6)UL
WBC # BLD AUTO: 6.3 X10(3) UL (ref 4–11)

## 2022-04-08 PROCEDURE — 85025 COMPLETE CBC W/AUTO DIFF WBC: CPT | Performed by: INTERNAL MEDICINE

## 2022-04-11 ENCOUNTER — SNF VISIT (OUTPATIENT)
Dept: INTERNAL MEDICINE CLINIC | Facility: SKILLED NURSING FACILITY | Age: 87
End: 2022-04-11

## 2022-04-11 ENCOUNTER — LAB REQUISITION (OUTPATIENT)
Dept: LAB | Facility: HOSPITAL | Age: 87
End: 2022-04-11
Payer: MEDICARE

## 2022-04-11 LAB
BASOPHILS # BLD: 0 X10(3) UL (ref 0–0.2)
BASOPHILS NFR BLD: 0 %
DEPRECATED RDW RBC AUTO: 58 FL (ref 35.1–46.3)
EOSINOPHIL # BLD: 0.26 X10(3) UL (ref 0–0.7)
EOSINOPHIL NFR BLD: 4 %
ERYTHROCYTE [DISTWIDTH] IN BLOOD BY AUTOMATED COUNT: 15.9 % (ref 11–15)
HCT VFR BLD AUTO: 25.4 %
HGB BLD-MCNC: 7.9 G/DL
LYMPHOCYTES NFR BLD: 0.66 X10(3) UL (ref 1–4)
LYMPHOCYTES NFR BLD: 10 %
MCH RBC QN AUTO: 32.1 PG (ref 26–34)
MCHC RBC AUTO-ENTMCNC: 31.1 G/DL (ref 31–37)
MCV RBC AUTO: 103.3 FL
MONOCYTES # BLD: 0.4 X10(3) UL (ref 0.1–1)
MONOCYTES NFR BLD: 6 %
NEUTROPHILS # BLD AUTO: 4.94 X10 (3) UL (ref 1.5–7.7)
NEUTROPHILS NFR BLD: 75 %
NEUTS BAND NFR BLD: 5 %
NEUTS HYPERSEG # BLD: 5.28 X10(3) UL (ref 1.5–7.7)
PLATELET # BLD AUTO: 45 10(3)UL (ref 150–450)
PLATELET MORPHOLOGY: NORMAL
RBC # BLD AUTO: 2.46 X10(6)UL
TOTAL CELLS COUNTED BLD: 100
TOXIC GRANULES BLD QL SMEAR: PRESENT
WBC # BLD AUTO: 6.6 X10(3) UL (ref 4–11)

## 2022-04-11 PROCEDURE — 85025 COMPLETE CBC W/AUTO DIFF WBC: CPT | Performed by: INTERNAL MEDICINE

## 2022-04-11 PROCEDURE — 85027 COMPLETE CBC AUTOMATED: CPT | Performed by: INTERNAL MEDICINE

## 2022-04-11 PROCEDURE — 99309 SBSQ NF CARE MODERATE MDM 30: CPT | Performed by: NURSE PRACTITIONER

## 2022-04-11 PROCEDURE — 85007 BL SMEAR W/DIFF WBC COUNT: CPT | Performed by: INTERNAL MEDICINE

## 2022-04-14 ENCOUNTER — LAB REQUISITION (OUTPATIENT)
Dept: LAB | Facility: HOSPITAL | Age: 87
End: 2022-04-14
Payer: MEDICARE

## 2022-04-14 LAB
BASOPHILS # BLD AUTO: 0.03 X10(3) UL (ref 0–0.2)
BASOPHILS NFR BLD AUTO: 0.4 %
DEPRECATED RDW RBC AUTO: 59.5 FL (ref 35.1–46.3)
EOSINOPHIL # BLD AUTO: 0.04 X10(3) UL (ref 0–0.7)
EOSINOPHIL NFR BLD AUTO: 0.5 %
ERYTHROCYTE [DISTWIDTH] IN BLOOD BY AUTOMATED COUNT: 16.2 % (ref 11–15)
HCT VFR BLD AUTO: 26.2 %
HGB BLD-MCNC: 8 G/DL
IMM GRANULOCYTES # BLD AUTO: 0.22 X10(3) UL (ref 0–1)
IMM GRANULOCYTES NFR BLD: 2.7 %
LYMPHOCYTES # BLD AUTO: 1.23 X10(3) UL (ref 1–4)
LYMPHOCYTES NFR BLD AUTO: 15.1 %
MCH RBC QN AUTO: 31.7 PG (ref 26–34)
MCHC RBC AUTO-ENTMCNC: 30.5 G/DL (ref 31–37)
MCV RBC AUTO: 104 FL
MONOCYTES # BLD AUTO: 0.61 X10(3) UL (ref 0.1–1)
MONOCYTES NFR BLD AUTO: 7.5 %
NEUTROPHILS # BLD AUTO: 5.99 X10 (3) UL (ref 1.5–7.7)
NEUTROPHILS # BLD AUTO: 5.99 X10(3) UL (ref 1.5–7.7)
NEUTROPHILS NFR BLD AUTO: 73.8 %
PLATELET # BLD AUTO: 53 10(3)UL (ref 150–450)
PLATELET MORPHOLOGY: NORMAL
RBC # BLD AUTO: 2.52 X10(6)UL
TOXIC GRANULES BLD QL SMEAR: PRESENT
WBC # BLD AUTO: 8.1 X10(3) UL (ref 4–11)

## 2022-04-14 PROCEDURE — 85025 COMPLETE CBC W/AUTO DIFF WBC: CPT | Performed by: INTERNAL MEDICINE

## 2022-04-24 ENCOUNTER — LAB REQUISITION (OUTPATIENT)
Dept: LAB | Facility: HOSPITAL | Age: 87
End: 2022-04-24
Payer: MEDICARE

## 2022-04-24 LAB
ALBUMIN SERPL-MCNC: 2.4 G/DL (ref 3.4–5)
ALBUMIN/GLOB SERPL: 0.8 {RATIO} (ref 1–2)
ALP LIVER SERPL-CCNC: 102 U/L
ALT SERPL-CCNC: 21 U/L
ANION GAP SERPL CALC-SCNC: 9 MMOL/L (ref 0–18)
AST SERPL-CCNC: 25 U/L (ref 15–37)
BASOPHILS # BLD AUTO: 0.03 X10(3) UL (ref 0–0.2)
BASOPHILS NFR BLD AUTO: 0.9 %
BILIRUB SERPL-MCNC: 0.3 MG/DL (ref 0.1–2)
BUN BLD-MCNC: 49 MG/DL (ref 7–18)
BUN/CREAT SERPL: 29 (ref 10–20)
CALCIUM BLD-MCNC: 7.6 MG/DL (ref 8.5–10.1)
CHLORIDE SERPL-SCNC: 105 MMOL/L (ref 98–112)
CO2 SERPL-SCNC: 25 MMOL/L (ref 21–32)
CREAT BLD-MCNC: 1.69 MG/DL
DEPRECATED RDW RBC AUTO: 57.6 FL (ref 35.1–46.3)
EOSINOPHIL # BLD AUTO: 0.01 X10(3) UL (ref 0–0.7)
EOSINOPHIL NFR BLD AUTO: 0.3 %
ERYTHROCYTE [DISTWIDTH] IN BLOOD BY AUTOMATED COUNT: 15.9 % (ref 11–15)
GLOBULIN PLAS-MCNC: 3.2 G/DL (ref 2.8–4.4)
GLUCOSE BLD-MCNC: 84 MG/DL (ref 70–99)
HCT VFR BLD AUTO: 26.8 %
HGB BLD-MCNC: 8.4 G/DL
IMM GRANULOCYTES # BLD AUTO: 0.16 X10(3) UL (ref 0–1)
IMM GRANULOCYTES NFR BLD: 4.6 %
LYMPHOCYTES # BLD AUTO: 0.34 X10(3) UL (ref 1–4)
LYMPHOCYTES NFR BLD AUTO: 9.8 %
MCH RBC QN AUTO: 31.5 PG (ref 26–34)
MCHC RBC AUTO-ENTMCNC: 31.3 G/DL (ref 31–37)
MCV RBC AUTO: 100.4 FL
MONOCYTES # BLD AUTO: 0.04 X10(3) UL (ref 0.1–1)
MONOCYTES NFR BLD AUTO: 1.2 %
NEUTROPHILS # BLD AUTO: 2.88 X10 (3) UL (ref 1.5–7.7)
NEUTROPHILS # BLD AUTO: 2.88 X10(3) UL (ref 1.5–7.7)
NEUTROPHILS NFR BLD AUTO: 83.2 %
OSMOLALITY SERPL CALC.SUM OF ELEC: 300 MOSM/KG (ref 275–295)
PLATELET # BLD AUTO: 117 10(3)UL (ref 150–450)
POTASSIUM SERPL-SCNC: 4.3 MMOL/L (ref 3.5–5.1)
PROT SERPL-MCNC: 5.6 G/DL (ref 6.4–8.2)
RBC # BLD AUTO: 2.67 X10(6)UL
SODIUM SERPL-SCNC: 139 MMOL/L (ref 136–145)
WBC # BLD AUTO: 3.5 X10(3) UL (ref 4–11)

## 2022-04-24 PROCEDURE — 85025 COMPLETE CBC W/AUTO DIFF WBC: CPT | Performed by: INTERNAL MEDICINE

## 2022-04-24 PROCEDURE — 80053 COMPREHEN METABOLIC PANEL: CPT | Performed by: INTERNAL MEDICINE

## 2022-04-26 ENCOUNTER — LAB REQUISITION (OUTPATIENT)
Dept: LAB | Facility: HOSPITAL | Age: 87
End: 2022-04-26
Payer: MEDICARE

## 2022-04-26 LAB
BASOPHILS # BLD AUTO: 0.01 X10(3) UL (ref 0–0.2)
BASOPHILS NFR BLD AUTO: 0.5 %
DEPRECATED RDW RBC AUTO: 57.6 FL (ref 35.1–46.3)
EOSINOPHIL # BLD AUTO: 0.02 X10(3) UL (ref 0–0.7)
EOSINOPHIL NFR BLD AUTO: 0.9 %
ERYTHROCYTE [DISTWIDTH] IN BLOOD BY AUTOMATED COUNT: 15.7 % (ref 11–15)
HCT VFR BLD AUTO: 24.3 %
HGB BLD-MCNC: 7.5 G/DL
IMM GRANULOCYTES # BLD AUTO: 0.04 X10(3) UL (ref 0–1)
IMM GRANULOCYTES NFR BLD: 1.8 %
LYMPHOCYTES # BLD AUTO: 0.55 X10(3) UL (ref 1–4)
LYMPHOCYTES NFR BLD AUTO: 25.1 %
MCH RBC QN AUTO: 31.3 PG (ref 26–34)
MCHC RBC AUTO-ENTMCNC: 30.9 G/DL (ref 31–37)
MCV RBC AUTO: 101.3 FL
MONOCYTES # BLD AUTO: 0.1 X10(3) UL (ref 0.1–1)
MONOCYTES NFR BLD AUTO: 4.6 %
NEUTROPHILS # BLD AUTO: 1.47 X10 (3) UL (ref 1.5–7.7)
NEUTROPHILS # BLD AUTO: 1.47 X10(3) UL (ref 1.5–7.7)
NEUTROPHILS NFR BLD AUTO: 67.1 %
PLATELET # BLD AUTO: 88 10(3)UL (ref 150–450)
RBC # BLD AUTO: 2.4 X10(6)UL
WBC # BLD AUTO: 2.2 X10(3) UL (ref 4–11)

## 2022-04-26 PROCEDURE — 85060 BLOOD SMEAR INTERPRETATION: CPT | Performed by: INTERNAL MEDICINE

## 2022-04-26 PROCEDURE — 85025 COMPLETE CBC W/AUTO DIFF WBC: CPT | Performed by: INTERNAL MEDICINE

## 2022-05-02 ENCOUNTER — SNF VISIT (OUTPATIENT)
Dept: INTERNAL MEDICINE CLINIC | Facility: SKILLED NURSING FACILITY | Age: 87
End: 2022-05-02

## 2022-05-02 ENCOUNTER — HOSPITAL ENCOUNTER (INPATIENT)
Facility: HOSPITAL | Age: 87
LOS: 3 days | Discharge: NURSING, REHAB OR LONG-TERM CARE | End: 2022-05-07
Attending: EMERGENCY MEDICINE | Admitting: HOSPITALIST
Payer: MEDICARE

## 2022-05-02 DIAGNOSIS — D64.9 ANEMIA, UNSPECIFIED TYPE: Primary | ICD-10-CM

## 2022-05-02 DIAGNOSIS — C56.9 MALIGNANT NEOPLASM OF OVARY, UNSPECIFIED LATERALITY (HCC): ICD-10-CM

## 2022-05-02 PROBLEM — E87.20 METABOLIC ACIDOSIS: Status: ACTIVE | Noted: 2022-05-02

## 2022-05-02 PROBLEM — R73.9 HYPERGLYCEMIA: Status: ACTIVE | Noted: 2022-05-02

## 2022-05-02 PROBLEM — E87.2 METABOLIC ACIDOSIS: Status: ACTIVE | Noted: 2022-05-02

## 2022-05-02 LAB
ANION GAP SERPL CALC-SCNC: 9 MMOL/L (ref 0–18)
ANTIBODY SCREEN: NEGATIVE
BASOPHILS # BLD AUTO: 0.01 X10(3) UL (ref 0–0.2)
BASOPHILS NFR BLD AUTO: 0.5 %
BILIRUB UR QL: NEGATIVE
BUN BLD-MCNC: 48 MG/DL (ref 7–18)
BUN/CREAT SERPL: 24.4 (ref 10–20)
CALCIUM BLD-MCNC: 7.7 MG/DL (ref 8.5–10.1)
CHLORIDE SERPL-SCNC: 108 MMOL/L (ref 98–112)
CO2 SERPL-SCNC: 21 MMOL/L (ref 21–32)
COLOR UR: YELLOW
CREAT BLD-MCNC: 1.97 MG/DL
DEPRECATED RDW RBC AUTO: 55.6 FL (ref 35.1–46.3)
DOHLE BOD BLD QL SMEAR: PRESENT
EOSINOPHIL # BLD AUTO: 0.01 X10(3) UL (ref 0–0.7)
EOSINOPHIL NFR BLD AUTO: 0.5 %
ERYTHROCYTE [DISTWIDTH] IN BLOOD BY AUTOMATED COUNT: 15.8 % (ref 11–15)
GLUCOSE BLD-MCNC: 113 MG/DL (ref 70–99)
GLUCOSE UR-MCNC: NEGATIVE MG/DL
HCT VFR BLD AUTO: 22.3 %
HCT VFR BLD AUTO: 27 %
HGB BLD-MCNC: 7.2 G/DL
HGB BLD-MCNC: 8.8 G/DL
HGB UR QL STRIP.AUTO: NEGATIVE
HYALINE CASTS #/AREA URNS AUTO: PRESENT /LPF
IMM GRANULOCYTES # BLD AUTO: 0.08 X10(3) UL (ref 0–1)
IMM GRANULOCYTES NFR BLD: 3.9 %
KETONES UR-MCNC: NEGATIVE MG/DL
LACTATE SERPL-SCNC: 1.7 MMOL/L (ref 0.4–2)
LYMPHOCYTES # BLD AUTO: 0.41 X10(3) UL (ref 1–4)
LYMPHOCYTES NFR BLD AUTO: 20.2 %
MCH RBC QN AUTO: 31.6 PG (ref 26–34)
MCHC RBC AUTO-ENTMCNC: 32.3 G/DL (ref 31–37)
MCV RBC AUTO: 97.8 FL
MONOCYTES # BLD AUTO: 0.32 X10(3) UL (ref 0.1–1)
MONOCYTES NFR BLD AUTO: 15.8 %
MRSA DNA SPEC QL NAA+PROBE: NEGATIVE
NEUTROPHILS # BLD AUTO: 1.2 X10 (3) UL (ref 1.5–7.7)
NEUTROPHILS # BLD AUTO: 1.2 X10(3) UL (ref 1.5–7.7)
NEUTROPHILS NFR BLD AUTO: 59.1 %
NITRITE UR QL STRIP.AUTO: NEGATIVE
OSMOLALITY SERPL CALC.SUM OF ELEC: 299 MOSM/KG (ref 275–295)
PH UR: 6 [PH] (ref 5–8)
PLATELET # BLD AUTO: 21 10(3)UL (ref 150–450)
PLATELET MORPHOLOGY: NORMAL
POTASSIUM SERPL-SCNC: 3.4 MMOL/L (ref 3.5–5.1)
PROT UR-MCNC: >=500 MG/DL
RBC # BLD AUTO: 2.28 X10(6)UL
RH BLOOD TYPE: POSITIVE
RH BLOOD TYPE: POSITIVE
SARS-COV-2 RNA RESP QL NAA+PROBE: NOT DETECTED
SODIUM SERPL-SCNC: 138 MMOL/L (ref 136–145)
SP GR UR STRIP: 1.01 (ref 1–1.03)
TOXIC GRANULES BLD QL SMEAR: PRESENT
UROBILINOGEN UR STRIP-ACNC: <2
VIT C UR-MCNC: NEGATIVE MG/DL
WBC # BLD AUTO: 2 X10(3) UL (ref 4–11)
WBC #/AREA URNS AUTO: >50 /HPF

## 2022-05-02 PROCEDURE — 86920 COMPATIBILITY TEST SPIN: CPT

## 2022-05-02 PROCEDURE — 93010 ELECTROCARDIOGRAM REPORT: CPT | Performed by: EMERGENCY MEDICINE

## 2022-05-02 PROCEDURE — 30233N1 TRANSFUSION OF NONAUTOLOGOUS RED BLOOD CELLS INTO PERIPHERAL VEIN, PERCUTANEOUS APPROACH: ICD-10-PCS | Performed by: EMERGENCY MEDICINE

## 2022-05-02 PROCEDURE — 85018 HEMOGLOBIN: CPT | Performed by: HOSPITALIST

## 2022-05-02 PROCEDURE — 85025 COMPLETE CBC W/AUTO DIFF WBC: CPT | Performed by: EMERGENCY MEDICINE

## 2022-05-02 PROCEDURE — 86644 CMV ANTIBODY: CPT | Performed by: INTERNAL MEDICINE

## 2022-05-02 PROCEDURE — 99285 EMERGENCY DEPT VISIT HI MDM: CPT

## 2022-05-02 PROCEDURE — 83605 ASSAY OF LACTIC ACID: CPT | Performed by: INTERNAL MEDICINE

## 2022-05-02 PROCEDURE — 86645 CMV ANTIBODY IGM: CPT | Performed by: INTERNAL MEDICINE

## 2022-05-02 PROCEDURE — 87641 MR-STAPH DNA AMP PROBE: CPT | Performed by: EMERGENCY MEDICINE

## 2022-05-02 PROCEDURE — 85014 HEMATOCRIT: CPT | Performed by: HOSPITALIST

## 2022-05-02 PROCEDURE — 86900 BLOOD TYPING SEROLOGIC ABO: CPT | Performed by: EMERGENCY MEDICINE

## 2022-05-02 PROCEDURE — 86901 BLOOD TYPING SEROLOGIC RH(D): CPT | Performed by: EMERGENCY MEDICINE

## 2022-05-02 PROCEDURE — 93005 ELECTROCARDIOGRAM TRACING: CPT

## 2022-05-02 PROCEDURE — 87086 URINE CULTURE/COLONY COUNT: CPT | Performed by: EMERGENCY MEDICINE

## 2022-05-02 PROCEDURE — 86787 VARICELLA-ZOSTER ANTIBODY: CPT | Performed by: INTERNAL MEDICINE

## 2022-05-02 PROCEDURE — 81001 URINALYSIS AUTO W/SCOPE: CPT | Performed by: EMERGENCY MEDICINE

## 2022-05-02 PROCEDURE — 80048 BASIC METABOLIC PNL TOTAL CA: CPT | Performed by: EMERGENCY MEDICINE

## 2022-05-02 PROCEDURE — 87040 BLOOD CULTURE FOR BACTERIA: CPT | Performed by: INTERNAL MEDICINE

## 2022-05-02 PROCEDURE — 86850 RBC ANTIBODY SCREEN: CPT | Performed by: EMERGENCY MEDICINE

## 2022-05-02 PROCEDURE — 36430 TRANSFUSION BLD/BLD COMPNT: CPT

## 2022-05-02 RX ORDER — ONDANSETRON 2 MG/ML
4 INJECTION INTRAMUSCULAR; INTRAVENOUS EVERY 6 HOURS PRN
Status: DISCONTINUED | OUTPATIENT
Start: 2022-05-02 | End: 2022-05-07

## 2022-05-02 RX ORDER — LEVOTHYROXINE SODIUM 0.07 MG/1
75 TABLET ORAL
Status: DISCONTINUED | OUTPATIENT
Start: 2022-05-03 | End: 2022-05-07

## 2022-05-02 RX ORDER — POLYETHYLENE GLYCOL 3350 17 G/17G
17 POWDER, FOR SOLUTION ORAL DAILY PRN
Status: DISCONTINUED | OUTPATIENT
Start: 2022-05-02 | End: 2022-05-07

## 2022-05-02 RX ORDER — ACETAMINOPHEN 325 MG/1
650 TABLET ORAL EVERY 6 HOURS PRN
Status: DISCONTINUED | OUTPATIENT
Start: 2022-05-02 | End: 2022-05-07

## 2022-05-02 RX ORDER — MELATONIN
325
Status: DISCONTINUED | OUTPATIENT
Start: 2022-05-03 | End: 2022-05-07

## 2022-05-02 RX ORDER — MELATONIN
2000 DAILY
Status: DISCONTINUED | OUTPATIENT
Start: 2022-05-03 | End: 2022-05-07

## 2022-05-02 RX ORDER — BISACODYL 10 MG
10 SUPPOSITORY, RECTAL RECTAL
Status: DISCONTINUED | OUTPATIENT
Start: 2022-05-02 | End: 2022-05-07

## 2022-05-02 RX ORDER — ACETAMINOPHEN 325 MG/1
650 TABLET ORAL EVERY 6 HOURS
Status: DISCONTINUED | OUTPATIENT
Start: 2022-05-02 | End: 2022-05-07

## 2022-05-02 RX ORDER — DOCUSATE SODIUM 100 MG/1
100 CAPSULE, LIQUID FILLED ORAL 2 TIMES DAILY PRN
COMMUNITY

## 2022-05-02 RX ORDER — SENNOSIDES 8.6 MG
17.2 TABLET ORAL NIGHTLY PRN
Status: DISCONTINUED | OUTPATIENT
Start: 2022-05-02 | End: 2022-05-07

## 2022-05-02 RX ORDER — POLYETHYLENE GLYCOL 3350 17 G/17G
17 POWDER, FOR SOLUTION ORAL DAILY
Status: DISCONTINUED | OUTPATIENT
Start: 2022-05-03 | End: 2022-05-07

## 2022-05-02 RX ORDER — ATORVASTATIN CALCIUM 10 MG/1
10 TABLET, FILM COATED ORAL NIGHTLY
Status: DISCONTINUED | OUTPATIENT
Start: 2022-05-02 | End: 2022-05-07

## 2022-05-02 RX ORDER — METOPROLOL TARTRATE 5 MG/5ML
5 INJECTION INTRAVENOUS ONCE
Status: COMPLETED | OUTPATIENT
Start: 2022-05-02 | End: 2022-05-02

## 2022-05-02 RX ORDER — POTASSIUM CHLORIDE 20 MEQ/1
40 TABLET, EXTENDED RELEASE ORAL ONCE
Status: COMPLETED | OUTPATIENT
Start: 2022-05-02 | End: 2022-05-02

## 2022-05-02 RX ORDER — SODIUM CHLORIDE 9 MG/ML
INJECTION, SOLUTION INTRAVENOUS CONTINUOUS
Status: DISCONTINUED | OUTPATIENT
Start: 2022-05-02 | End: 2022-05-03

## 2022-05-02 RX ORDER — GABAPENTIN 100 MG/1
100 CAPSULE ORAL NIGHTLY
Status: DISCONTINUED | OUTPATIENT
Start: 2022-05-02 | End: 2022-05-07

## 2022-05-02 RX ORDER — METOCLOPRAMIDE HYDROCHLORIDE 5 MG/ML
5 INJECTION INTRAMUSCULAR; INTRAVENOUS EVERY 8 HOURS PRN
Status: DISCONTINUED | OUTPATIENT
Start: 2022-05-02 | End: 2022-05-07

## 2022-05-02 NOTE — ED QUICK NOTES
Orders for admission, patient is aware of plan and ready to go upstairs. Any questions, please call ED RN sunshine at extension [phone].      Patient Covid vaccination status: Fully vaccinated and immunocompromised     COVID Test Ordered in ED: Rapid SARS-CoV-2 by PCR    COVID Suspicion at Admission: N/A    Running Infusions:  None    Mental Status/LOC at time of transport: AXOX4    Other pertinent information:   CIWA score: N/A   NIH score:  N/A

## 2022-05-02 NOTE — ED INITIAL ASSESSMENT (HPI)
Pt to ED via EMS who states she had hgb 6.9 but asymptomatic, hx ovarian CA last chemo was 2 weeks ago. AXOX4.

## 2022-05-03 ENCOUNTER — APPOINTMENT (OUTPATIENT)
Dept: GENERAL RADIOLOGY | Facility: HOSPITAL | Age: 87
End: 2022-05-03
Attending: HOSPITALIST
Payer: MEDICARE

## 2022-05-03 LAB
ANION GAP SERPL CALC-SCNC: 10 MMOL/L (ref 0–18)
BASOPHILS # BLD: 0 X10(3) UL (ref 0–0.2)
BASOPHILS NFR BLD: 0 %
BILIRUB UR QL: NEGATIVE
BLOOD TYPE BARCODE: 5100
BLOOD TYPE BARCODE: 5100
BUN BLD-MCNC: 43 MG/DL (ref 7–18)
BUN/CREAT SERPL: 25.9 (ref 10–20)
C DIFF TOX B STL QL: NEGATIVE
CALCIUM BLD-MCNC: 7.8 MG/DL (ref 8.5–10.1)
CHLORIDE SERPL-SCNC: 113 MMOL/L (ref 98–112)
CLARITY UR: CLEAR
CO2 SERPL-SCNC: 19 MMOL/L (ref 21–32)
COLOR UR: YELLOW
CREAT BLD-MCNC: 1.66 MG/DL
DEPRECATED RDW RBC AUTO: 68.2 FL (ref 35.1–46.3)
EOSINOPHIL # BLD: 0 X10(3) UL (ref 0–0.7)
EOSINOPHIL NFR BLD: 0 %
ERYTHROCYTE [DISTWIDTH] IN BLOOD BY AUTOMATED COUNT: 21.7 % (ref 11–15)
GLUCOSE BLD-MCNC: 94 MG/DL (ref 70–99)
GLUCOSE UR-MCNC: 50 MG/DL
HCT VFR BLD AUTO: 25.7 %
HGB BLD-MCNC: 8.5 G/DL
KETONES UR-MCNC: NEGATIVE MG/DL
LEUKOCYTE ESTERASE UR QL STRIP.AUTO: NEGATIVE
LYMPHOCYTES NFR BLD: 0.85 X10(3) UL (ref 1–4)
LYMPHOCYTES NFR BLD: 34 %
MCH RBC QN AUTO: 29 PG (ref 26–34)
MCHC RBC AUTO-ENTMCNC: 33.1 G/DL (ref 31–37)
MCV RBC AUTO: 87.7 FL
MONOCYTES # BLD: 0.28 X10(3) UL (ref 0.1–1)
MONOCYTES NFR BLD: 11 %
NEUTROPHILS # BLD AUTO: 1.33 X10 (3) UL (ref 1.5–7.7)
NEUTROPHILS NFR BLD: 55 %
NEUTS HYPERSEG # BLD: 1.38 X10(3) UL (ref 1.5–7.7)
NITRITE UR QL STRIP.AUTO: NEGATIVE
NRBC BLD MANUAL-RTO: 1 %
OSMOLALITY SERPL CALC.SUM OF ELEC: 305 MOSM/KG (ref 275–295)
PH UR: 6 [PH] (ref 5–8)
PLATELET # BLD AUTO: 19 10(3)UL (ref 150–450)
PLATELET MORPHOLOGY: NORMAL
POTASSIUM SERPL-SCNC: 3.6 MMOL/L (ref 3.5–5.1)
POTASSIUM SERPL-SCNC: 3.6 MMOL/L (ref 3.5–5.1)
PROCALCITONIN SERPL-MCNC: 0.67 NG/ML (ref ?–0.16)
PROT UR-MCNC: >=500 MG/DL
RBC # BLD AUTO: 2.93 X10(6)UL
SODIUM SERPL-SCNC: 142 MMOL/L (ref 136–145)
SP GR UR STRIP: 1.01 (ref 1–1.03)
TOTAL CELLS COUNTED BLD: 100
UROBILINOGEN UR STRIP-ACNC: <2
VIT C UR-MCNC: NEGATIVE MG/DL
WBC # BLD AUTO: 2.5 X10(3) UL (ref 4–11)

## 2022-05-03 PROCEDURE — 81001 URINALYSIS AUTO W/SCOPE: CPT | Performed by: INTERNAL MEDICINE

## 2022-05-03 PROCEDURE — 85007 BL SMEAR W/DIFF WBC COUNT: CPT | Performed by: HOSPITALIST

## 2022-05-03 PROCEDURE — 87493 C DIFF AMPLIFIED PROBE: CPT | Performed by: HOSPITALIST

## 2022-05-03 PROCEDURE — 84132 ASSAY OF SERUM POTASSIUM: CPT | Performed by: HOSPITALIST

## 2022-05-03 PROCEDURE — 80048 BASIC METABOLIC PNL TOTAL CA: CPT | Performed by: HOSPITALIST

## 2022-05-03 PROCEDURE — 87798 DETECT AGENT NOS DNA AMP: CPT | Performed by: INTERNAL MEDICINE

## 2022-05-03 PROCEDURE — 84145 PROCALCITONIN (PCT): CPT | Performed by: INTERNAL MEDICINE

## 2022-05-03 PROCEDURE — 71045 X-RAY EXAM CHEST 1 VIEW: CPT | Performed by: HOSPITALIST

## 2022-05-03 PROCEDURE — 85027 COMPLETE CBC AUTOMATED: CPT | Performed by: HOSPITALIST

## 2022-05-03 PROCEDURE — 85025 COMPLETE CBC W/AUTO DIFF WBC: CPT | Performed by: HOSPITALIST

## 2022-05-03 PROCEDURE — 99213 OFFICE O/P EST LOW 20 MIN: CPT

## 2022-05-03 RX ORDER — METOPROLOL TARTRATE 50 MG/1
50 TABLET, FILM COATED ORAL
Status: DISCONTINUED | OUTPATIENT
Start: 2022-05-03 | End: 2022-05-04

## 2022-05-03 RX ORDER — METOPROLOL TARTRATE 5 MG/5ML
5 INJECTION INTRAVENOUS EVERY 6 HOURS PRN
Status: DISCONTINUED | OUTPATIENT
Start: 2022-05-03 | End: 2022-05-07

## 2022-05-03 RX ORDER — VANCOMYCIN HYDROCHLORIDE 125 MG/1
125 CAPSULE ORAL DAILY
Status: DISCONTINUED | OUTPATIENT
Start: 2022-05-03 | End: 2022-05-07

## 2022-05-03 NOTE — PHYSICAL THERAPY NOTE
PT orders received, chart reviewed. Spoke with RN Darylene Space who approves participation. Patient presents resting in bed, opens eyes to name. Explained who we are and goals/rationale but patient is not agreeable even with encouragement. She states \"Will you please just leave me alone\" and closes her eyes and stops responding. RN aware, will check back. 1310: Patient presents sleeping in bed. She opens eyes to name but closes them when seeing therapists. She does not respond until OT says that we will check back tomorrow. Pt shakes her head yes in agreement. Will plan to check back tomorrow. KRISTOPHER, RN aware.

## 2022-05-03 NOTE — OCCUPATIONAL THERAPY NOTE
OT orders received, chart reviewed. Nurse approves pt's participation in session. Pt declined to participate at this time, indicating weakness and fatigue. Nurse and  aware. Plan to re attempt this afternoon. Second attempt made to see pt for OT eval. Pt asleep in bed. Pt acknowledges therapists presence, indicating she does not wish to participate in session. Pt kept eyes shut most of occasions, indicating agreement with plan to attempt to reschedule session for tomorrow. Nurse and  aware.

## 2022-05-03 NOTE — PLAN OF CARE
Problem: Patient Centered Care  Goal: Patient preferences are identified and integrated in the patient's plan of care  Description: Interventions:  - What would you like us to know as we care for you?   - Provide timely, complete, and accurate information to patient/family  - Incorporate patient and family knowledge, values, beliefs, and cultural backgrounds into the planning and delivery of care  - Encourage patient/family to participate in care and decision-making at the level they choose  - Honor patient and family perspectives and choices  Outcome: Progressing     Problem: Patient/Family Goals  Goal: Patient/Family Long Term Goal  Description: Patient's Long Term Goal:     Interventions:  -   - See additional Care Plan goals for specific interventions  Outcome: Progressing  Goal: Patient/Family Short Term Goal  Description: Patient's Short Term Goal:     Interventions:   -   - See additional Care Plan goals for specific interventions  Outcome: Progressing     Problem: PAIN - ADULT  Goal: Verbalizes/displays adequate comfort level or patient's stated pain goal  Description: INTERVENTIONS:  - Encourage pt to monitor pain and request assistance  - Assess pain using appropriate pain scale  - Administer analgesics based on type and severity of pain and evaluate response  - Implement non-pharmacological measures as appropriate and evaluate response  - Consider cultural and social influences on pain and pain management  - Manage/alleviate anxiety  - Utilize distraction and/or relaxation techniques  - Monitor for opioid side effects  - Notify MD/LIP if interventions unsuccessful or patient reports new pain  - Anticipate increased pain with activity and pre-medicate as appropriate  Outcome: Progressing     Problem: RISK FOR INFECTION - ADULT  Goal: Absence of fever/infection during anticipated neutropenic period  Description: INTERVENTIONS  - Monitor WBC  - Administer growth factors as ordered  - Implement neutropenic guidelines  Outcome: Progressing     Problem: SAFETY ADULT - FALL  Goal: Free from fall injury  Description: INTERVENTIONS:  - Assess pt frequently for physical needs  - Identify cognitive and physical deficits and behaviors that affect risk of falls. - [place] fall precautions as indicated by assessment.  - Educate pt/family on patient safety including physical limitations  - Instruct pt to call for assistance with activity based on assessment  - Modify environment to reduce risk of injury  - Provide assistive devices as appropriate  - Consider OT/PT consult to assist with strengthening/mobility  - Encourage toileting schedule  Outcome: Progressing     Problem: DISCHARGE PLANNING  Goal: Discharge to home or other facility with appropriate resources  Description: INTERVENTIONS:  - Identify barriers to discharge w/pt and caregiver  - Include patient/family/discharge partner in discharge planning  - Arrange for needed discharge resources and transportation as appropriate  - Identify discharge learning needs (meds, wound care, etc)  - Arrange for interpreters to assist at discharge as needed  - Consider post-discharge preferences of patient/family/discharge partner  - Complete POLST form as appropriate  - Assess patient's ability to be responsible for managing their own health  - Refer to Case Management Department for coordinating discharge planning if the patient needs post-hospital services based on physician/LIP order or complex needs related to functional status, cognitive ability or social support system  Outcome: Progressing     Pt arrived from ED. Pt is from [facility]. Pt is alert and oriented x3/4 forgetful at times. Wears glasses but left her glasses at home. [place], no hearing aids. Pt c/o to her buttocks but refused to take any pain meds. Tele in place. HR and BP were elevated, pt was asymptomatic. MD paged twice, see orders in [facility] Purewick in place.  Pt had diarrhea at home, needs stool sample for c-diff, no bm on 7a-7p shift. Blood transfusion finished, blood drawn for H&H, endorsed to night nurse. Blanchable redness to bilateral heels- mepilex applied. Sacrum Mepilex applied for protection. 2 wounds on right buttock and one wound on right external labia, wound consult placed. Pt is on IV Rocephin for UTI. Call light in reach.

## 2022-05-03 NOTE — PLAN OF CARE
Pt. A+O3-4, [place], on rocephin. Sepsis BPA fired 2040, BP elevated, HR elevated, low-grade fever. MD notified. Orders for labs + IVF. Lactic acid came back normal 1.7. H+H redraw after 1u PRBC admin during day showed improvement in Hbg 7.2-->8.8. Pt in afib, -130, on tele. IV fluids given and temp trending down. Cdiff sample obtained, results pending. Refused pain medications. Metoprolol given in AM with HR/BP elevated. 2nd Sepsis BPA alert at [place] [address], MD notified of AM pt. Status. No new orders, continue to monitor and keep an eye out for AM CBC lab results.        Problem: Patient Centered Care  Goal: Patient preferences are identified and integrated in the patient's plan of care  Description: Interventions:  - What would you like us to know as we care for you?   - Provide timely, complete, and accurate information to patient/family  - Incorporate patient and family knowledge, values, beliefs, and cultural backgrounds into the planning and delivery of care  - Encourage patient/family to participate in care and decision-making at the level they choose  - Honor patient and family perspectives and choices  Outcome: Progressing     Problem: Patient/Family Goals  Goal: Patient/Family Long Term Goal  Description: Patient's Long Term Goal: Discharged back to [facility]    Interventions:  - Monitor vital signs  - Administer abx  - Advance activity as tolerated  - Treat pain  - Follow MD orders  - Monitor labs  - See additional Care Plan goals for specific interventions  Outcome: Not Progressing      Problem: Patient/Family Goals  Goal: Patient/Family Short Term Goal  Description: Patient's Short Term Goal: Control HR + BP    Interventions:   - Monitor vital signs  - Administer medications as ordered  - Ivfluids  - Monitor labs  - Monitor notifications from tele  - See additional Care Plan goals for specific interventions  Outcome: Not Progressing        Problem: PAIN - ADULT  Goal: Verbalizes/displays adequate comfort level or patient's stated pain goal  Description: INTERVENTIONS:  - Encourage pt to monitor pain and request assistance  - Assess pain using appropriate pain scale  - Administer analgesics based on type and severity of pain and evaluate response  - Implement non-pharmacological measures as appropriate and evaluate response  - Consider cultural and social influences on pain and pain management  - Manage/alleviate anxiety  - Utilize distraction and/or relaxation techniques  - Monitor for opioid side effects  - Notify MD/LIP if interventions unsuccessful or patient reports new pain  - Anticipate increased pain with activity and pre-medicate as appropriate  Outcome: Progressing     Problem: RISK FOR INFECTION - ADULT  Goal: Absence of fever/infection during anticipated neutropenic period  Description: INTERVENTIONS  - Monitor WBC  - Administer growth factors as ordered  - Implement neutropenic guidelines  Outcome: Progressing     Problem: SAFETY ADULT - FALL  Goal: Free from fall injury  Description: INTERVENTIONS:  - Assess pt frequently for physical needs  - Identify cognitive and physical deficits and behaviors that affect risk of falls.   - [place] fall precautions as indicated by assessment.  - Educate pt/family on patient safety including physical limitations  - Instruct pt to call for assistance with activity based on assessment  - Modify environment to reduce risk of injury  - Provide assistive devices as appropriate  - Consider OT/PT consult to assist with strengthening/mobility  - Encourage toileting schedule  Outcome: Progressing

## 2022-05-03 NOTE — PROGRESS NOTES
[facility]    CDI Prediction Tool Protocol (Vancomycin Initiated)    OVP (oral vancomycin prophylaxis) 125 mg PO Daily is being started in this patient based on a score of 14. Score Breakdown:  High risk antibiotic use (5 points)  Malignancy (3 points)     Long term care facility resident (1 point)  Hypoalbuminemia: < 3g/dL (1 point)  Age >/= 80 years (3 points)  Recently hospitalized: within 90 days (1 point)    This patient is currently at high risk for developing CDI due to his/her score being >/=13 points and is being started on prophylactic oral vancomycin to prevent Cdiff. This patient does not have C. difficile infection. All measures taken within this protocol are to decrease the risk of CDI development.     Bishop Soriano, PharmD  5/3/2022  7:41 AM  [place]  Pharmacy Extension: [phone]

## 2022-05-03 NOTE — CM/SW NOTE
05/03/22 1100   CM/SW Referral Data   Referral Source Physician   Reason for Referral Discharge planning   Informant EMR;Clinical Staff Member   Pertinent Medical Hx   Does patient have an established PCP? Yes  Lisa Zarco)   Patient Info   Patient's Current Mental Status at Time of Assessment Alert;Oriented   Patient's Home Environment Independent Living  ([facility])   Patient Status Prior to Admission   Independent with ADLs and Mobility No   Pt. requires assistance with Housework;Driving;Meals; Bathing; Ambulating;Dressing;Medications; Toileting;Finances   Discharge Needs   Anticipated D/C needs Subacute rehab   Services Requested   PASRR Level 1 Submitted No - Current within 90 days   Choice of Post-Acute Provider   Informed patient of right to choose their preferred provider Yes   Patient/family choice [facility]     Pt discussed during nursing rounds. Dx anemia, r/o CDIFF. [place] [facility] [address] Pt will likely need to return to [facility] [place]. KITTY referral sent to [facility] in [address], liaison Warren Rivers confirmed patient can return once medically stable. No PASRR required since last completed is within 90 day window. PT/OT evals pending. Plan: PP for KITTY pending medical clearance. / to remain available for support and/or discharge planning.      LE aHle    [phone]

## 2022-05-03 NOTE — PLAN OF CARE
Pt received in no acute distress. No c/o, worked with wound care RN to assess wound on r labia and buttock. Aloe vesta and foam mepilex applied, Pt turned q 2 hours, PT/OT to eval patient. 1900-HR 1 teen's-120's. At times pt will be 140's-150's MD aware of vitals. Orders placed.      Problem: Patient Centered Care  Goal: Patient preferences are identified and integrated in the patient's plan of care  Description: Interventions:  - What would you like us to know as we care for you?   - Provide timely, complete, and accurate information to patient/family  - Incorporate patient and family knowledge, values, beliefs, and cultural backgrounds into the planning and delivery of care  - Encourage patient/family to participate in care and decision-making at the level they choose  - Honor patient and family perspectives and choices  Outcome: Progressing     Problem: Patient/Family Goals  Goal: Patient/Family Long Term Goal  Description: Patient's Long Term Goal: Discharged back to [facility]    Interventions:  - Monitor vital signs  - Administer abx  - Advance activity as tolerated  - Treat pain  - Follow MD orders  - Monitor labs  - See additional Care Plan goals for specific interventions  Outcome: Progressing  Goal: Patient/Family Short Term Goal  Description: Patient's Short Term Goal: Control HR + BP    Interventions:   - Monitor vital signs  - Administer medications as ordered  - Ivfluids  - Monitor labs  - Monitor notifications from tele  - See additional Care Plan goals for specific interventions  Outcome: Progressing     Problem: PAIN - ADULT  Goal: Verbalizes/displays adequate comfort level or patient's stated pain goal  Description: INTERVENTIONS:  - Encourage pt to monitor pain and request assistance  - Assess pain using appropriate pain scale  - Administer analgesics based on type and severity of pain and evaluate response  - Implement non-pharmacological measures as appropriate and evaluate response  - Consider cultural and social influences on pain and pain management  - Manage/alleviate anxiety  - Utilize distraction and/or relaxation techniques  - Monitor for opioid side effects  - Notify MD/LIP if interventions unsuccessful or patient reports new pain  - Anticipate increased pain with activity and pre-medicate as appropriate  Outcome: Progressing     Problem: RISK FOR INFECTION - ADULT  Goal: Absence of fever/infection during anticipated neutropenic period  Description: INTERVENTIONS  - Monitor WBC  - Administer growth factors as ordered  - Implement neutropenic guidelines  Outcome: Progressing     Problem: SAFETY ADULT - FALL  Goal: Free from fall injury  Description: INTERVENTIONS:  - Assess pt frequently for physical needs  - Identify cognitive and physical deficits and behaviors that affect risk of falls.   - [place] fall precautions as indicated by assessment.  - Educate pt/family on patient safety including physical limitations  - Instruct pt to call for assistance with activity based on assessment  - Modify environment to reduce risk of injury  - Provide assistive devices as appropriate  - Consider OT/PT consult to assist with strengthening/mobility  - Encourage toileting schedule  Outcome: Progressing     Problem: DISCHARGE PLANNING  Goal: Discharge to home or other facility with appropriate resources  Description: INTERVENTIONS:  - Identify barriers to discharge w/pt and caregiver  - Include patient/family/discharge partner in discharge planning  - Arrange for needed discharge resources and transportation as appropriate  - Identify discharge learning needs (meds, wound care, etc)  - Arrange for interpreters to assist at discharge as needed  - Consider post-discharge preferences of patient/family/discharge partner  - Complete POLST form as appropriate  - Assess patient's ability to be responsible for managing their own health  - Refer to Case Management Department for coordinating discharge planning if the patient needs post-hospital services based on physician/LIP order or complex needs related to functional status, cognitive ability or social support system  Outcome: Progressing

## 2022-05-04 LAB
ANION GAP SERPL CALC-SCNC: 8 MMOL/L (ref 0–18)
BASOPHILS # BLD: 0 X10(3) UL (ref 0–0.2)
BASOPHILS NFR BLD: 0 %
BUN BLD-MCNC: 36 MG/DL (ref 7–18)
BUN/CREAT SERPL: 23.4 (ref 10–20)
CALCIUM BLD-MCNC: 7.7 MG/DL (ref 8.5–10.1)
CHLORIDE SERPL-SCNC: 114 MMOL/L (ref 98–112)
CO2 SERPL-SCNC: 20 MMOL/L (ref 21–32)
CREAT BLD-MCNC: 1.54 MG/DL
DEPRECATED RDW RBC AUTO: 70.1 FL (ref 35.1–46.3)
EOSINOPHIL # BLD: 0 X10(3) UL (ref 0–0.7)
EOSINOPHIL NFR BLD: 0 %
ERYTHROCYTE [DISTWIDTH] IN BLOOD BY AUTOMATED COUNT: 21.8 % (ref 11–15)
GLUCOSE BLD-MCNC: 98 MG/DL (ref 70–99)
HCT VFR BLD AUTO: 25.4 %
HGB BLD-MCNC: 8.1 G/DL
LYMPHOCYTES NFR BLD: 0.35 X10(3) UL (ref 1–4)
LYMPHOCYTES NFR BLD: 14 %
MCH RBC QN AUTO: 28.6 PG (ref 26–34)
MCHC RBC AUTO-ENTMCNC: 31.9 G/DL (ref 31–37)
MCV RBC AUTO: 89.8 FL
METAMYELOCYTES # BLD: 0.03 X10(3) UL
METAMYELOCYTES NFR BLD: 1 %
MONOCYTES # BLD: 0.23 X10(3) UL (ref 0.1–1)
MONOCYTES NFR BLD: 9 %
MYELOCYTES # BLD: 0.03 X10(3) UL
MYELOCYTES NFR BLD: 1 %
NEUTROPHILS # BLD AUTO: 1.27 X10 (3) UL (ref 1.5–7.7)
NEUTROPHILS NFR BLD: 62 %
NEUTS BAND NFR BLD: 13 %
NEUTS HYPERSEG # BLD: 1.88 X10(3) UL (ref 1.5–7.7)
OSMOLALITY SERPL CALC.SUM OF ELEC: 302 MOSM/KG (ref 275–295)
PLATELET # BLD AUTO: 21 10(3)UL (ref 150–450)
PLATELET MORPHOLOGY: NORMAL
POTASSIUM SERPL-SCNC: 3.3 MMOL/L (ref 3.5–5.1)
RBC # BLD AUTO: 2.83 X10(6)UL
SODIUM SERPL-SCNC: 142 MMOL/L (ref 136–145)
TOTAL CELLS COUNTED BLD: 100
VZV IGG SER IA-ACNC: 997.9 (ref 165–?)
WBC # BLD AUTO: 2.5 X10(3) UL (ref 4–11)

## 2022-05-04 PROCEDURE — 85027 COMPLETE CBC AUTOMATED: CPT | Performed by: HOSPITALIST

## 2022-05-04 PROCEDURE — 80048 BASIC METABOLIC PNL TOTAL CA: CPT | Performed by: HOSPITALIST

## 2022-05-04 PROCEDURE — 97166 OT EVAL MOD COMPLEX 45 MIN: CPT

## 2022-05-04 PROCEDURE — 97530 THERAPEUTIC ACTIVITIES: CPT

## 2022-05-04 PROCEDURE — 85007 BL SMEAR W/DIFF WBC COUNT: CPT | Performed by: HOSPITALIST

## 2022-05-04 PROCEDURE — 97162 PT EVAL MOD COMPLEX 30 MIN: CPT

## 2022-05-04 PROCEDURE — 85025 COMPLETE CBC W/AUTO DIFF WBC: CPT | Performed by: HOSPITALIST

## 2022-05-04 RX ORDER — METOPROLOL TARTRATE 50 MG/1
50 TABLET, FILM COATED ORAL
Status: DISCONTINUED | OUTPATIENT
Start: 2022-05-04 | End: 2022-05-06

## 2022-05-04 RX ORDER — POTASSIUM CHLORIDE 1.5 G/1.77G
40 POWDER, FOR SOLUTION ORAL ONCE
Status: COMPLETED | OUTPATIENT
Start: 2022-05-04 | End: 2022-05-04

## 2022-05-04 RX ORDER — MAGNESIUM HYDROXIDE/ALUMINUM HYDROXICE/SIMETHICONE 120; 1200; 1200 MG/30ML; MG/30ML; MG/30ML
30 SUSPENSION ORAL 4 TIMES DAILY PRN
Status: DISCONTINUED | OUTPATIENT
Start: 2022-05-04 | End: 2022-05-07

## 2022-05-04 RX ORDER — FAMOTIDINE 20 MG/1
20 TABLET, FILM COATED ORAL DAILY
Status: DISCONTINUED | OUTPATIENT
Start: 2022-05-04 | End: 2022-05-07

## 2022-05-04 NOTE — PLAN OF CARE
Problem: Patient Centered Care  Goal: Patient preferences are identified and integrated in the patient's plan of care  Description: Interventions:  - What would you like us to know as we care for you?   - Provide timely, complete, and accurate information to patient/family  - Incorporate patient and family knowledge, values, beliefs, and cultural backgrounds into the planning and delivery of care  - Encourage patient/family to participate in care and decision-making at the level they choose  - Honor patient and family perspectives and choices  Outcome: Progressing     Problem: Patient/Family Goals  Goal: Patient/Family Long Term Goal  Description: Patient's Long Term Goal: Discharged back to [facility]    Interventions:  - Monitor vital signs  - Administer abx  - Advance activity as tolerated  - Treat pain  - Follow MD orders  - Monitor labs  - See additional Care Plan goals for specific interventions  Outcome: Progressing  Goal: Patient/Family Short Term Goal  Description: Patient's Short Term Goal: Control HR + BP    Interventions:   - Monitor vital signs  - Administer medications as ordered  - Ivfluids  - Monitor labs  - Monitor notifications from tele  - See additional Care Plan goals for specific interventions  Outcome: Progressing     Problem: PAIN - ADULT  Goal: Verbalizes/displays adequate comfort level or patient's stated pain goal  Description: INTERVENTIONS:  - Encourage pt to monitor pain and request assistance  - Assess pain using appropriate pain scale  - Administer analgesics based on type and severity of pain and evaluate response  - Implement non-pharmacological measures as appropriate and evaluate response  - Consider cultural and social influences on pain and pain management  - Manage/alleviate anxiety  - Utilize distraction and/or relaxation techniques  - Monitor for opioid side effects  - Notify MD/LIP if interventions unsuccessful or patient reports new pain  - Anticipate increased pain with activity and pre-medicate as appropriate  Outcome: Progressing     Problem: RISK FOR INFECTION - ADULT  Goal: Absence of fever/infection during anticipated neutropenic period  Description: INTERVENTIONS  - Monitor WBC  - Administer growth factors as ordered  - Implement neutropenic guidelines  Outcome: Progressing     Problem: SAFETY ADULT - FALL  Goal: Free from fall injury  Description: INTERVENTIONS:  - Assess pt frequently for physical needs  - Identify cognitive and physical deficits and behaviors that affect risk of falls. - [place] fall precautions as indicated by assessment.  - Educate pt/family on patient safety including physical limitations  - Instruct pt to call for assistance with activity based on assessment  - Modify environment to reduce risk of injury  - Provide assistive devices as appropriate  - Consider OT/PT consult to assist with strengthening/mobility  - Encourage toileting schedule  Outcome: Progressing     Problem: DISCHARGE PLANNING  Goal: Discharge to home or other facility with appropriate resources  Description: INTERVENTIONS:  - Identify barriers to discharge w/pt and caregiver  - Include patient/family/discharge partner in discharge planning  - Arrange for needed discharge resources and transportation as appropriate  - Identify discharge learning needs (meds, wound care, etc)  - Arrange for interpreters to assist at discharge as needed  - Consider post-discharge preferences of patient/family/discharge partner  - Complete POLST form as appropriate  - Assess patient's ability to be responsible for managing their own health  - Refer to Case Management Department for coordinating discharge planning if the patient needs post-hospital services based on physician/LIP order or complex needs related to functional status, cognitive ability or social support system  Outcome: Progressing     Patient A&O3-4, hard of hearing. Out of bed with PT, OT, max assist. Call light within reach.  Encourage pain control and incentive spirometer. Recommending KITTY, potentially [place]. Incontinent of bowel and bladder. Monitor vaginal/buttock wounds and conducted wound care as ordered. Repleted K+. Monitor VS, labs, continue IV abx. Discharge plan pending [facility] placement and medical clearance. Continue to monitor patient.

## 2022-05-04 NOTE — CM/SW NOTE
Pt discussed during nursing rounds. Pt being worked up for Sepsis. PT/OT mary added to [facility] referral.  Bed at [facility] res. Plan: PP when Med stable. / to remain available for support and/or discharge planning. Evelia Davila.  Blossom Bingham RN, BSN  Nurse   [phone]

## 2022-05-05 LAB
ANION GAP SERPL CALC-SCNC: 9 MMOL/L (ref 0–18)
BASOPHILS # BLD: 0 X10(3) UL (ref 0–0.2)
BASOPHILS NFR BLD: 0 %
BUN BLD-MCNC: 34 MG/DL (ref 7–18)
BUN/CREAT SERPL: 21 (ref 10–20)
CALCIUM BLD-MCNC: 7.4 MG/DL (ref 8.5–10.1)
CHLORIDE SERPL-SCNC: 113 MMOL/L (ref 98–112)
CO2 SERPL-SCNC: 19 MMOL/L (ref 21–32)
CREAT BLD-MCNC: 1.62 MG/DL
DEPRECATED RDW RBC AUTO: 68.7 FL (ref 35.1–46.3)
EOSINOPHIL # BLD: 0 X10(3) UL (ref 0–0.7)
EOSINOPHIL NFR BLD: 0 %
ERYTHROCYTE [DISTWIDTH] IN BLOOD BY AUTOMATED COUNT: 21.5 % (ref 11–15)
GLUCOSE BLD-MCNC: 110 MG/DL (ref 70–99)
HCT VFR BLD AUTO: 25.5 %
HGB BLD-MCNC: 8.2 G/DL
LYMPHOCYTES NFR BLD: 0.63 X10(3) UL (ref 1–4)
LYMPHOCYTES NFR BLD: 18 %
MCH RBC QN AUTO: 29 PG (ref 26–34)
MCHC RBC AUTO-ENTMCNC: 32.2 G/DL (ref 31–37)
MCV RBC AUTO: 90.1 FL
MONOCYTES # BLD: 0.28 X10(3) UL (ref 0.1–1)
MONOCYTES NFR BLD: 8 %
NEUTROPHILS # BLD AUTO: 2.21 X10 (3) UL (ref 1.5–7.7)
NEUTROPHILS NFR BLD: 67 %
NEUTS BAND NFR BLD: 7 %
NEUTS HYPERSEG # BLD: 2.59 X10(3) UL (ref 1.5–7.7)
OSMOLALITY SERPL CALC.SUM OF ELEC: 300 MOSM/KG (ref 275–295)
PLATELET # BLD AUTO: 30 10(3)UL (ref 150–450)
PLATELET MORPHOLOGY: NORMAL
POTASSIUM SERPL-SCNC: 3.6 MMOL/L (ref 3.5–5.1)
POTASSIUM SERPL-SCNC: 3.6 MMOL/L (ref 3.5–5.1)
RBC # BLD AUTO: 2.83 X10(6)UL
SODIUM SERPL-SCNC: 141 MMOL/L (ref 136–145)
TOTAL CELLS COUNTED BLD: 100
VARICELLA-ZOSTER VIRUS AB, IGM: 0.11 ISR
WBC # BLD AUTO: 3.5 X10(3) UL (ref 4–11)

## 2022-05-05 PROCEDURE — 85025 COMPLETE CBC W/AUTO DIFF WBC: CPT | Performed by: HOSPITALIST

## 2022-05-05 PROCEDURE — 85027 COMPLETE CBC AUTOMATED: CPT | Performed by: HOSPITALIST

## 2022-05-05 PROCEDURE — 84132 ASSAY OF SERUM POTASSIUM: CPT | Performed by: HOSPITALIST

## 2022-05-05 PROCEDURE — 80048 BASIC METABOLIC PNL TOTAL CA: CPT | Performed by: HOSPITALIST

## 2022-05-05 PROCEDURE — 85007 BL SMEAR W/DIFF WBC COUNT: CPT | Performed by: HOSPITALIST

## 2022-05-05 NOTE — PHYSICAL THERAPY NOTE
Attempted treatment pt declined to get out of bed today. Educated pt on the importance of getting out of the bed, pt still declined. Will follow up tomorrow as appropriate. RN aware.

## 2022-05-05 NOTE — PLAN OF CARE
No acute changes overnight. Afebrile. Voiding. Multiple incontinent BM overnight. Turning for comfort. Repositioning frequently. Refusing tylenol. Care plan on going    Problem: Patient/Family Goals  Goal: Patient/Family Short Term Goal  Description: Patient's Short Term Goal: Control HR + BP    Interventions:   - Monitor vital signs  - Administer medications as ordered  - Ivfluids  - Monitor labs  - Monitor notifications from tele  - See additional Care Plan goals for specific interventions  Outcome: Progressing     Problem: SAFETY ADULT - FALL  Goal: Free from fall injury  Description: INTERVENTIONS:  - Assess pt frequently for physical needs  - Identify cognitive and physical deficits and behaviors that affect risk of falls.   - [place] fall precautions as indicated by assessment.  - Educate pt/family on patient safety including physical limitations  - Instruct pt to call for assistance with activity based on assessment  - Modify environment to reduce risk of injury  - Provide assistive devices as appropriate  - Consider OT/PT consult to assist with strengthening/mobility  - Encourage toileting schedule  Outcome: Progressing

## 2022-05-05 NOTE — CM/SW NOTE
MD notified CM that pt will likely be ready for dc tomorrow 5/6, Liaison Paul Jarquin at [facility]. Patel King placed on will call for 5/6, PCS completed. Plan: PP for [facility]TY pending medical clearance.     NURYS [facility]    [phone]

## 2022-05-06 ENCOUNTER — APPOINTMENT (OUTPATIENT)
Dept: CT IMAGING | Facility: HOSPITAL | Age: 87
End: 2022-05-06
Attending: HOSPITALIST
Payer: MEDICARE

## 2022-05-06 LAB
ANION GAP SERPL CALC-SCNC: 9 MMOL/L (ref 0–18)
BASOPHILS # BLD: 0 X10(3) UL (ref 0–0.2)
BASOPHILS NFR BLD: 0 %
BUN BLD-MCNC: 32 MG/DL (ref 7–18)
BUN/CREAT SERPL: 20.6 (ref 10–20)
CALCIUM BLD-MCNC: 7.4 MG/DL (ref 8.5–10.1)
CHLORIDE SERPL-SCNC: 111 MMOL/L (ref 98–112)
CMV ANTIBODY IGG: <0.2 U/ML
CMV ANTIBODY IGM: <8 AU/ML
CO2 SERPL-SCNC: 19 MMOL/L (ref 21–32)
CREAT BLD-MCNC: 1.55 MG/DL
DEPRECATED RDW RBC AUTO: 67 FL (ref 35.1–46.3)
EOSINOPHIL # BLD: 0 X10(3) UL (ref 0–0.7)
EOSINOPHIL NFR BLD: 0 %
ERYTHROCYTE [DISTWIDTH] IN BLOOD BY AUTOMATED COUNT: 21.3 % (ref 11–15)
GLUCOSE BLD-MCNC: 95 MG/DL (ref 70–99)
HCT VFR BLD AUTO: 24.7 %
HGB BLD-MCNC: 7.9 G/DL
LYMPHOCYTES NFR BLD: 0.46 X10(3) UL (ref 1–4)
LYMPHOCYTES NFR BLD: 12 %
MCH RBC QN AUTO: 28.8 PG (ref 26–34)
MCHC RBC AUTO-ENTMCNC: 32 G/DL (ref 31–37)
MCV RBC AUTO: 90.1 FL
MONOCYTES # BLD: 0.15 X10(3) UL (ref 0.1–1)
MONOCYTES NFR BLD: 4 %
NEUTROPHILS # BLD AUTO: 2.44 X10 (3) UL (ref 1.5–7.7)
NEUTROPHILS NFR BLD: 83 %
NEUTS BAND NFR BLD: 1 %
NEUTS HYPERSEG # BLD: 3.19 X10(3) UL (ref 1.5–7.7)
NRBC BLD MANUAL-RTO: 2 %
OSMOLALITY SERPL CALC.SUM OF ELEC: 295 MOSM/KG (ref 275–295)
PLATELET # BLD AUTO: 36 10(3)UL (ref 150–450)
PLATELET MORPHOLOGY: NORMAL
POTASSIUM SERPL-SCNC: 3.5 MMOL/L (ref 3.5–5.1)
RBC # BLD AUTO: 2.74 X10(6)UL
SODIUM SERPL-SCNC: 139 MMOL/L (ref 136–145)
TOTAL CELLS COUNTED BLD: 100
VARICELLA-ZOSTER VIRUS BY PCR: NOT DETECTED
WBC # BLD AUTO: 3.8 X10(3) UL (ref 4–11)

## 2022-05-06 PROCEDURE — 74176 CT ABD & PELVIS W/O CONTRAST: CPT | Performed by: HOSPITALIST

## 2022-05-06 PROCEDURE — 80048 BASIC METABOLIC PNL TOTAL CA: CPT | Performed by: HOSPITALIST

## 2022-05-06 PROCEDURE — 85007 BL SMEAR W/DIFF WBC COUNT: CPT | Performed by: HOSPITALIST

## 2022-05-06 PROCEDURE — 85027 COMPLETE CBC AUTOMATED: CPT | Performed by: HOSPITALIST

## 2022-05-06 PROCEDURE — 85025 COMPLETE CBC W/AUTO DIFF WBC: CPT | Performed by: HOSPITALIST

## 2022-05-06 RX ORDER — LOPERAMIDE HYDROCHLORIDE 2 MG/1
2 CAPSULE ORAL 4 TIMES DAILY PRN
Status: DISCONTINUED | OUTPATIENT
Start: 2022-05-06 | End: 2022-05-07

## 2022-05-06 RX ORDER — METOPROLOL TARTRATE 50 MG/1
50 TABLET, FILM COATED ORAL
Status: DISCONTINUED | OUTPATIENT
Start: 2022-05-06 | End: 2022-05-07

## 2022-05-06 NOTE — PLAN OF CARE
A/ox4, forgetful at times, reoriented, on RA, tolerating diet, denies nausea, x2 max assist, on remote tele, Hx A-fib, SCDs in place, incontinent x2, purewick in place, blood pressure elevated received scheduled metoprolol, no acute changes overnight, call light within reach, safety measures in place, frequent rounding done, will continue to monitor. Problem: Patient Centered Care  Goal: Patient preferences are identified and integrated in the patient's plan of care  Description: Interventions:  - What would you like us to know as we care for you?  I have been at [facility] since my surgery in February.  - Provide timely, complete, and accurate information to patient/family  - Incorporate patient and family knowledge, values, beliefs, and cultural backgrounds into the planning and delivery of care  - Encourage patient/family to participate in care and decision-making at the level they choose  - Honor patient and family perspectives and choices  Outcome: Progressing     Problem: Patient/Family Goals  Goal: Patient/Family Long Term Goal  Description: [facility]'s Long [facility]    Interventions:  - Monitor vital signs  - Administer abx  - Advance activity as tolerated  - Treat pain  - Follow MD orders  - Monitor labs  - See additional Care Plan goals for specific interventions  Outcome: Progressing  Goal: Patient/Family Short Term Goal  Description: Patient's Short Term Goal: Control HR + BP    Interventions:   - Monitor vital signs  - Administer medications as ordered  - Ivfluids  - Monitor labs  - Monitor notifications from tele  - See additional Care Plan goals for specific interventions  Outcome: Progressing     Problem: PAIN - ADULT  Goal: Verbalizes/displays adequate comfort level or patient's stated pain goal  Description: INTERVENTIONS:  - Encourage pt to monitor pain and request assistance  - Assess pain using appropriate pain scale  - Administer analgesics based on type and severity of pain and evaluate response  - Implement non-pharmacological measures as appropriate and evaluate response  - Consider cultural and social influences on pain and pain management  - Manage/alleviate anxiety  - Utilize distraction and/or relaxation techniques  - Monitor for opioid side effects  - Notify MD/LIP if interventions unsuccessful or patient reports new pain  - Anticipate increased pain with activity and pre-medicate as appropriate  Outcome: Progressing     Problem: RISK FOR INFECTION - ADULT  Goal: Absence of fever/infection during anticipated neutropenic period  Description: INTERVENTIONS  - Monitor WBC  - Administer growth factors as ordered  - Implement neutropenic guidelines  Outcome: Progressing     Problem: SAFETY ADULT - FALL  Goal: Free from fall injury  Description: INTERVENTIONS:  - Assess pt frequently for physical needs  - Identify cognitive and physical deficits and behaviors that affect risk of falls.   - [place] fall precautions as indicated by assessment.  - Educate pt/family on patient safety including physical limitations  - Instruct pt to call for assistance with activity based on assessment  - Modify environment to reduce risk of injury  - Provide assistive devices as appropriate  - Consider OT/PT consult to assist with strengthening/mobility  - Encourage toileting schedule  Outcome: Progressing     Problem: DISCHARGE PLANNING  Goal: Discharge to home or other facility with appropriate resources  Description: INTERVENTIONS:  - Identify barriers to discharge w/pt and caregiver  - Include patient/family/discharge partner in discharge planning  - Arrange for needed discharge resources and transportation as appropriate  - Identify discharge learning needs (meds, wound care, etc)  - Arrange for interpreters to assist at discharge as needed  - Consider post-discharge preferences of patient/family/discharge partner  - Complete POLST form as appropriate  - Assess patient's ability to be responsible for managing their own health  - Refer to Case Management Department for coordinating discharge planning if the patient needs post-hospital services based on physician/LIP order or complex needs related to functional status, cognitive ability or social support system  Outcome: Progressing     Problem: HEMATOLOGIC - ADULT  Goal: Maintains hematologic stability  Description: INTERVENTIONS  - Assess for signs and symptoms of bleeding or hemorrhage  - Monitor labs and vital signs for trends  - Administer supportive blood products/factors, fluids and medications as ordered and appropriate  - Administer supportive blood products/factors as ordered and appropriate  Outcome: Progressing  Goal: Free from bleeding injury  Description: (Example usage: patient with low platelets)  INTERVENTIONS:  - Avoid intramuscular injections, enemas and rectal medication administration  - Ensure safe mobilization of patient  - Hold pressure on venipuncture sites to achieve adequate hemostasis  - Assess for signs and symptoms of internal bleeding  - Monitor lab trends  - Patient is to report abnormal signs of bleeding to staff  - Avoid use of toothpicks and dental floss  - Use electric shaver for shaving  - Use soft bristle tooth brush  - Limit straining and forceful nose blowing  Outcome: Progressing

## 2022-05-06 NOTE — CM/SW NOTE
MD notified CM will be held until the weekend, CM notified liaison Cipriano Wilder at [facility]. Maegan Arrieta placed on will call for the weekend, PCS updated. Plan: PP for KITTY pending medical clearance.     Sena Carmona, BSN    [phone]

## 2022-05-06 NOTE — PLAN OF CARE
Discussed plan of care for day, including all given medications and their indications. Hemoglobin stable at 8.2. IV Rocephin discontinued per Infectious Disease. Turning and repositioning for skin protection -- Aloe Vesta applied to superficial wounds to right buttocks and labia as ordered and as needed. Patient refused transfer to chair today after attempts with physical therapy and nursing staff. Closely monitoring WBC and platelet level. Comfort measures encouraged to promote restful environment. Plan for potential discharge back to [facility]. Problem: Patient Centered Care  Goal: Patient preferences are identified and integrated in the patient's plan of care  Description: Interventions:  - What would you like us to know as we care for you?  I have been at [facility] since my surgery in February.  - Provide timely, complete, and accurate information to patient/family  - Incorporate patient and family knowledge, values, beliefs, and cultural backgrounds into the planning and delivery of care  - Encourage patient/family to participate in care and decision-making at the level they choose  - Honor patient and family perspectives and choices  Outcome: Progressing     Problem: Patient/Family Goals  Goal: Patient/Family Long Term Goal  Description: Patient's Long Term Goal: Discharged back to [facility]    Interventions:  - Monitor vital signs  - Administer abx  - Advance activity as tolerated  - Treat pain  - Follow MD orders  - Monitor labs  - See additional Care Plan goals for specific interventions  Outcome: Progressing  Goal: Patient/Family Short Term Goal  Description: Patient's Short Term Goal: Control HR + BP    Interventions:   - Monitor vital signs  - Administer medications as ordered  - Ivfluids  - Monitor labs  - Monitor notifications from tele  - See additional Care Plan goals for specific interventions  Outcome: Progressing     Problem: PAIN - ADULT  Goal: Verbalizes/displays adequate comfort level or patient's stated pain goal  Description: INTERVENTIONS:  - Encourage pt to monitor pain and request assistance  - Assess pain using appropriate pain scale  - Administer analgesics based on type and severity of pain and evaluate response  - Implement non-pharmacological measures as appropriate and evaluate response  - Consider cultural and social influences on pain and pain management  - Manage/alleviate anxiety  - Utilize distraction and/or relaxation techniques  - Monitor for opioid side effects  - Notify MD/LIP if interventions unsuccessful or patient reports new pain  - Anticipate increased pain with activity and pre-medicate as appropriate  Outcome: Progressing     Problem: RISK FOR INFECTION - ADULT  Goal: Absence of fever/infection during anticipated neutropenic period  Description: INTERVENTIONS  - Monitor WBC  - Administer growth factors as ordered  - Implement neutropenic guidelines  Outcome: Progressing     Problem: SAFETY ADULT - FALL  Goal: Free from fall injury  Description: INTERVENTIONS:  - Assess pt frequently for physical needs  - Identify cognitive and physical deficits and behaviors that affect risk of falls.   - [place] fall precautions as indicated by assessment.  - Educate pt/family on patient safety including physical limitations  - Instruct pt to call for assistance with activity based on assessment  - Modify environment to reduce risk of injury  - Provide assistive devices as appropriate  - Consider OT/PT consult to assist with strengthening/mobility  - Encourage toileting schedule  Outcome: Progressing     Problem: DISCHARGE PLANNING  Goal: Discharge to home or other facility with appropriate resources  Description: INTERVENTIONS:  - Identify barriers to discharge w/pt and caregiver  - Include patient/family/discharge partner in discharge planning  - Arrange for needed discharge resources and transportation as appropriate  - Identify discharge learning needs (meds, wound care, etc)  - Arrange for interpreters to assist at discharge as needed  - Consider post-discharge preferences of patient/family/discharge partner  - Complete POLST form as appropriate  - Assess patient's ability to be responsible for managing their own health  - Refer to Case Management Department for coordinating discharge planning if the patient needs post-hospital services based on physician/LIP order or complex needs related to functional status, cognitive ability or social support system  Outcome: Progressing     Problem: HEMATOLOGIC - ADULT  Goal: Maintains hematologic stability  Description: INTERVENTIONS  - Assess for signs and symptoms of bleeding or hemorrhage  - Monitor labs and vital signs for trends  - Administer supportive blood products/factors, fluids and medications as ordered and appropriate  - Administer supportive blood products/factors as ordered and appropriate  Outcome: Progressing  Goal: Free from bleeding injury  Description: (Example usage: patient with low platelets)  INTERVENTIONS:  - Avoid intramuscular injections, enemas and rectal medication administration  - Ensure safe mobilization of patient  - Hold pressure on venipuncture sites to achieve adequate hemostasis  - Assess for signs and symptoms of internal bleeding  - Monitor lab trends  - Patient is to report abnormal signs of bleeding to staff  - Avoid use of toothpicks and dental floss  - Use electric shaver for shaving  - Use soft bristle tooth brush  - Limit straining and forceful nose blowing  Outcome: Progressing     All efforts maintained to promote infection prevention during my assessment and care for this patient. Stethoscope cleansed and hand hygiene strictly maintained.

## 2022-05-06 NOTE — PLAN OF CARE
Discussed plan of care for day, including all given medications and their indications. Hemoglobin slightly decreased to 7.9. Monitoring off IV antibiotics. Turning and repositioning for skin protection -- Aloe Vesta applied to superficial wounds to right buttocks and labia as ordered and as needed. CT scan of abdomen/pelvis with oral contrast obtained -- awaiting results. Closely monitoring WBC and platelet level. Intermittent tachycardia with activity -- returns to 90's, low 100's after rest.  Comfort measures encouraged to promote restful environment. Plan for potential discharge back to [facility]. Problem: Patient Centered Care  Goal: Patient preferences are identified and integrated in the patient's plan of care  Description: Interventions:  - What would you like us to know as we care for you?  I have been at [facility] since my surgery in February.  - Provide timely, complete, and accurate information to patient/family  - Incorporate patient and family knowledge, values, beliefs, and cultural backgrounds into the planning and delivery of care  - Encourage patient/family to participate in care and decision-making at the level they choose  - Honor patient and family perspectives and choices  Outcome: Progressing     Problem: Patient/Family Goals  Goal: Patient/Family Long Term Goal  Description: Patient's Long Term Goal: Discharged back to [facility]    Interventions:  - Monitor vital signs  - Administer abx  - Advance activity as tolerated  - Treat pain  - Follow MD orders  - Monitor labs  - See additional Care Plan goals for specific interventions  Outcome: Progressing  Goal: Patient/Family Short Term Goal  Description: Patient's Short Term Goal: Control HR + BP    Interventions:   - Monitor vital signs  - Administer medications as ordered  - Ivfluids  - Monitor labs  - Monitor notifications from tele  - See additional Care Plan goals for specific interventions  Outcome: Progressing Problem: PAIN - ADULT  Goal: Verbalizes/displays adequate comfort level or patient's stated pain goal  Description: INTERVENTIONS:  - Encourage pt to monitor pain and request assistance  - Assess pain using appropriate pain scale  - Administer analgesics based on type and severity of pain and evaluate response  - Implement non-pharmacological measures as appropriate and evaluate response  - Consider cultural and social influences on pain and pain management  - Manage/alleviate anxiety  - Utilize distraction and/or relaxation techniques  - Monitor for opioid side effects  - Notify MD/LIP if interventions unsuccessful or patient reports new pain  - Anticipate increased pain with activity and pre-medicate as appropriate  Outcome: Progressing     Problem: RISK FOR INFECTION - ADULT  Goal: Absence of fever/infection during anticipated neutropenic period  Description: INTERVENTIONS  - Monitor WBC  - Administer growth factors as ordered  - Implement neutropenic guidelines  Outcome: Progressing     Problem: SAFETY ADULT - FALL  Goal: Free from fall injury  Description: INTERVENTIONS:  - Assess pt frequently for physical needs  - Identify cognitive and physical deficits and behaviors that affect risk of falls.   - [place] fall precautions as indicated by assessment.  - Educate pt/family on patient safety including physical limitations  - Instruct pt to call for assistance with activity based on assessment  - Modify environment to reduce risk of injury  - Provide assistive devices as appropriate  - Consider OT/PT consult to assist with strengthening/mobility  - Encourage toileting schedule  Outcome: Progressing     Problem: DISCHARGE PLANNING  Goal: Discharge to home or other facility with appropriate resources  Description: INTERVENTIONS:  - Identify barriers to discharge w/pt and caregiver  - Include patient/family/discharge partner in discharge planning  - Arrange for needed discharge resources and transportation as appropriate  - Identify discharge learning needs (meds, wound care, etc)  - Arrange for interpreters to assist at discharge as needed  - Consider post-discharge preferences of patient/family/discharge partner  - Complete POLST form as appropriate  - Assess patient's ability to be responsible for managing their own health  - Refer to Case Management Department for coordinating discharge planning if the patient needs post-hospital services based on physician/LIP order or complex needs related to functional status, cognitive ability or social support system  Outcome: Progressing     Problem: HEMATOLOGIC - ADULT  Goal: Maintains hematologic stability  Description: INTERVENTIONS  - Assess for signs and symptoms of bleeding or hemorrhage  - Monitor labs and vital signs for trends  - Administer supportive blood products/factors, fluids and medications as ordered and appropriate  - Administer supportive blood products/factors as ordered and appropriate  Outcome: Progressing  Goal: Free from bleeding injury  Description: (Example usage: patient with low platelets)  INTERVENTIONS:  - Avoid intramuscular injections, enemas and rectal medication administration  - Ensure safe mobilization of patient  - Hold pressure on venipuncture sites to achieve adequate hemostasis  - Assess for signs and symptoms of internal bleeding  - Monitor lab trends  - Patient is to report abnormal signs of bleeding to staff  - Avoid use of toothpicks and dental floss  - Use electric shaver for shaving  - Use soft bristle tooth brush  - Limit straining and forceful nose blowing  Outcome: Progressing     All efforts maintained to promote infection prevention during my assessment and care for this patient. Stethoscope cleansed and hand hygiene strictly maintained.

## 2022-05-07 VITALS
OXYGEN SATURATION: 98 % | WEIGHT: 117.25 LBS | HEART RATE: 114 BPM | BODY MASS INDEX: 23 KG/M2 | TEMPERATURE: 98 F | SYSTOLIC BLOOD PRESSURE: 124 MMHG | RESPIRATION RATE: 18 BRPM | DIASTOLIC BLOOD PRESSURE: 87 MMHG

## 2022-05-07 LAB
ANION GAP SERPL CALC-SCNC: 10 MMOL/L (ref 0–18)
BASOPHILS # BLD: 0.05 X10(3) UL (ref 0–0.2)
BASOPHILS NFR BLD: 1 %
BILIRUB UR QL CFM: POSITIVE
BUN BLD-MCNC: 32 MG/DL (ref 7–18)
BUN/CREAT SERPL: 20.5 (ref 10–20)
CALCIUM BLD-MCNC: 7.5 MG/DL (ref 8.5–10.1)
CHLORIDE SERPL-SCNC: 111 MMOL/L (ref 98–112)
CMV QUANT BY PCR, CPY/ML: <390 CPY/ML
CMV QUANT BY PCR, INTERP: NOT DETECTED
CMV QUANT BY PCR, IU/ML: <227 IU/ML
CMV QUANT BY PCR, LOG CPY/ML: <2.6 LOG CPY/ML
CMV QUANT BY PCR, LOG IU/ML: <2.4 LOG IU/ML
CO2 SERPL-SCNC: 18 MMOL/L (ref 21–32)
COLOR UR: YELLOW
CREAT BLD-MCNC: 1.56 MG/DL
DEPRECATED RDW RBC AUTO: 66.4 FL (ref 35.1–46.3)
EOSINOPHIL # BLD: 0.05 X10(3) UL (ref 0–0.7)
EOSINOPHIL NFR BLD: 1 %
ERYTHROCYTE [DISTWIDTH] IN BLOOD BY AUTOMATED COUNT: 21.4 % (ref 11–15)
GLUCOSE BLD-MCNC: 93 MG/DL (ref 70–99)
GLUCOSE UR-MCNC: 50 MG/DL
HCT VFR BLD AUTO: 27.2 %
HGB BLD-MCNC: 8.7 G/DL
KETONES UR-MCNC: NEGATIVE MG/DL
LYMPHOCYTES NFR BLD: 0.55 X10(3) UL (ref 1–4)
LYMPHOCYTES NFR BLD: 12 %
MCH RBC QN AUTO: 28.9 PG (ref 26–34)
MCHC RBC AUTO-ENTMCNC: 32 G/DL (ref 31–37)
MCV RBC AUTO: 90.4 FL
MONOCYTES # BLD: 0.78 X10(3) UL (ref 0.1–1)
MONOCYTES NFR BLD: 17 %
MYELOCYTES # BLD: 0.05 X10(3) UL
MYELOCYTES NFR BLD: 1 %
NEUTROPHILS # BLD AUTO: 2.96 X10 (3) UL (ref 1.5–7.7)
NEUTROPHILS NFR BLD: 66 %
NEUTS BAND NFR BLD: 2 %
NEUTS HYPERSEG # BLD: 3.13 X10(3) UL (ref 1.5–7.7)
NITRITE UR QL STRIP.AUTO: NEGATIVE
OSMOLALITY SERPL CALC.SUM OF ELEC: 295 MOSM/KG (ref 275–295)
PH UR: 6 [PH] (ref 5–8)
PLATELET # BLD AUTO: 51 10(3)UL (ref 150–450)
PLATELET MORPHOLOGY: NORMAL
POTASSIUM SERPL-SCNC: 3.3 MMOL/L (ref 3.5–5.1)
PROT UR-MCNC: 100 MG/DL
RBC # BLD AUTO: 3.01 X10(6)UL
RBC #/AREA URNS AUTO: >10 /HPF
SARS-COV-2 RNA RESP QL NAA+PROBE: NOT DETECTED
SODIUM SERPL-SCNC: 139 MMOL/L (ref 136–145)
SP GR UR STRIP: 1.02 (ref 1–1.03)
TOTAL CELLS COUNTED BLD: 100
UROBILINOGEN UR STRIP-ACNC: 2
VIT C UR-MCNC: NEGATIVE MG/DL
WBC # BLD AUTO: 4.6 X10(3) UL (ref 4–11)
WBC #/AREA URNS AUTO: >50 /HPF

## 2022-05-07 PROCEDURE — 85027 COMPLETE CBC AUTOMATED: CPT | Performed by: HOSPITALIST

## 2022-05-07 PROCEDURE — 85007 BL SMEAR W/DIFF WBC COUNT: CPT | Performed by: HOSPITALIST

## 2022-05-07 PROCEDURE — 81001 URINALYSIS AUTO W/SCOPE: CPT | Performed by: HOSPITALIST

## 2022-05-07 PROCEDURE — 87186 SC STD MICRODIL/AGAR DIL: CPT | Performed by: HOSPITALIST

## 2022-05-07 PROCEDURE — 80048 BASIC METABOLIC PNL TOTAL CA: CPT | Performed by: HOSPITALIST

## 2022-05-07 PROCEDURE — 85025 COMPLETE CBC W/AUTO DIFF WBC: CPT | Performed by: HOSPITALIST

## 2022-05-07 PROCEDURE — 87077 CULTURE AEROBIC IDENTIFY: CPT | Performed by: HOSPITALIST

## 2022-05-07 PROCEDURE — 87086 URINE CULTURE/COLONY COUNT: CPT | Performed by: HOSPITALIST

## 2022-05-07 RX ORDER — CEFDINIR 300 MG/1
300 CAPSULE ORAL 2 TIMES DAILY
Qty: 20 CAPSULE | Refills: 0 | Status: SHIPPED | OUTPATIENT
Start: 2022-05-07

## 2022-05-07 RX ORDER — FAMOTIDINE 20 MG/1
20 TABLET, FILM COATED ORAL DAILY
Qty: 30 TABLET | Refills: 0 | Status: SHIPPED | OUTPATIENT
Start: 2022-05-08

## 2022-05-07 RX ORDER — CEFDINIR 300 MG/1
300 CAPSULE ORAL 2 TIMES DAILY
Status: DISCONTINUED | OUTPATIENT
Start: 2022-05-07 | End: 2022-05-07

## 2022-05-07 RX ORDER — VANCOMYCIN HYDROCHLORIDE 125 MG/1
125 CAPSULE ORAL DAILY
Qty: 14 CAPSULE | Refills: 0 | Status: SHIPPED | OUTPATIENT
Start: 2022-05-08

## 2022-05-07 RX ORDER — POTASSIUM CHLORIDE 20 MEQ/1
40 TABLET, EXTENDED RELEASE ORAL ONCE
Status: COMPLETED | OUTPATIENT
Start: 2022-05-07 | End: 2022-05-07

## 2022-05-07 RX ORDER — MAGNESIUM HYDROXIDE/ALUMINUM HYDROXICE/SIMETHICONE 120; 1200; 1200 MG/30ML; MG/30ML; MG/30ML
30 SUSPENSION ORAL 4 TIMES DAILY PRN
Qty: 355 ML | Refills: 0 | Status: SHIPPED | OUTPATIENT
Start: 2022-05-07

## 2022-05-07 NOTE — PLAN OF CARE
Pt alert but drowsy this evening. Refusing evening meds and wanting to rest. Was able to ambulate to the bathroom with 1 and RW before going to bed. Purewick in place. Prompt incontinence care provided. Aloe vesta and mepilex in place. Call light within reach. Safety precautions in place. Plan to discharge to [facility] once medically cleared. Problem: Patient Centered Care  Goal: Patient preferences are identified and integrated in the patient's plan of care  Description: Interventions:  - What would you like us to know as we care for you?  I have been at [facility] since my surgery in February.  - Provide timely, complete, and accurate information to patient/family  - Incorporate patient and family knowledge, values, beliefs, and cultural backgrounds into the planning and delivery of care  - Encourage patient/family to participate in care and decision-making at the level they choose  - Honor patient and family perspectives and choices  Outcome: Progressing     Problem: Patient/Family Goals  Goal: Patient/Family Long Term Goal  Description: Patient's Long Term Goal: Discharged back to [facility]    Interventions:  - Monitor vital signs  - Administer abx  - Advance activity as tolerated  - Treat pain  - Follow MD orders  - Monitor labs  - See additional Care Plan goals for specific interventions  Outcome: Progressing  Goal: Patient/Family Short Term Goal  Description: Patient's Short Term Goal: Control HR + BP    Interventions:   - Monitor vital signs  - Administer medications as ordered  - Ivfluids  - Monitor labs  - Monitor notifications from tele  - See additional Care Plan goals for specific interventions  Outcome: Progressing     Problem: PAIN - ADULT  Goal: Verbalizes/displays adequate comfort level or patient's stated pain goal  Description: INTERVENTIONS:  - Encourage pt to monitor pain and request assistance  - Assess pain using appropriate pain scale  - Administer analgesics based on type and severity of pain and evaluate response  - Implement non-pharmacological measures as appropriate and evaluate response  - Consider cultural and social influences on pain and pain management  - Manage/alleviate anxiety  - Utilize distraction and/or relaxation techniques  - Monitor for opioid side effects  - Notify MD/LIP if interventions unsuccessful or patient reports new pain  - Anticipate increased pain with activity and pre-medicate as appropriate  Outcome: Progressing     Problem: RISK FOR INFECTION - ADULT  Goal: Absence of fever/infection during anticipated neutropenic period  Description: INTERVENTIONS  - Monitor WBC  - Administer growth factors as ordered  - Implement neutropenic guidelines  Outcome: Progressing     Problem: SAFETY ADULT - FALL  Goal: Free from fall injury  Description: INTERVENTIONS:  - Assess pt frequently for physical needs  - Identify cognitive and physical deficits and behaviors that affect risk of falls.   - [place] fall precautions as indicated by assessment.  - Educate pt/family on patient safety including physical limitations  - Instruct pt to call for assistance with activity based on assessment  - Modify environment to reduce risk of injury  - Provide assistive devices as appropriate  - Consider OT/PT consult to assist with strengthening/mobility  - Encourage toileting schedule  Outcome: Progressing     Problem: DISCHARGE PLANNING  Goal: Discharge to home or other facility with appropriate resources  Description: INTERVENTIONS:  - Identify barriers to discharge w/pt and caregiver  - Include patient/family/discharge partner in discharge planning  - Arrange for needed discharge resources and transportation as appropriate  - Identify discharge learning needs (meds, wound care, etc)  - Arrange for interpreters to assist at discharge as needed  - Consider post-discharge preferences of patient/family/discharge partner  - Complete POLST form as appropriate  - Assess patient's ability to be responsible for managing their own health  - Refer to Case Management Department for coordinating discharge planning if the patient needs post-hospital services based on physician/LIP order or complex needs related to functional status, cognitive ability or social support system  Outcome: Progressing     Problem: HEMATOLOGIC - ADULT  Goal: Maintains hematologic stability  Description: INTERVENTIONS  - Assess for signs and symptoms of bleeding or hemorrhage  - Monitor labs and vital signs for trends  - Administer supportive blood products/factors, fluids and medications as ordered and appropriate  - Administer supportive blood products/factors as ordered and appropriate  Outcome: Progressing  Goal: Free from bleeding injury  Description: (Example usage: patient with low platelets)  INTERVENTIONS:  - Avoid intramuscular injections, enemas and rectal medication administration  - Ensure safe mobilization of patient  - Hold pressure on venipuncture sites to achieve adequate hemostasis  - Assess for signs and symptoms of internal bleeding  - Monitor lab trends  - Patient is to report abnormal signs of bleeding to staff  - Avoid use of toothpicks and dental floss  - Use electric shaver for shaving  - Use soft bristle tooth brush  - Limit straining and forceful nose blowing  Outcome: Progressing

## 2022-05-07 NOTE — CM/SW NOTE
05/07/22 1200   Discharge disposition   Expected discharge disposition [address] Provider [facility]   Discharge transportation [facility]     Notified by RN, Tacey Goodpasture that patient is ready for discharge today pending u/a results   Called lia[facility] and they can accept at any time and she requested a Rapid Covid  Notified Alee of Covid test    RN Report [phone]    [facility] at 5pm  PCS filled out in [address]- negative, uploaded to [facility] worker/ to remain available for support and/or discharge planning.      Gurjit Wlide MBA MSN, RN CTL/  [id number]

## 2022-05-07 NOTE — PLAN OF CARE
Plan of care reviewed with Neva and her Karen McPherson. Patient's HGB stable. Patient continues to have loose stools. Given imodium for relief. Urinalysis/ culture collected this afternoon. Oral antibiotic started for UTI. Patient denies pain with urination/ discomfort. Vital signs WNL. Tolerating diet. Discharged to [facility] this evening by [facility].

## 2022-05-11 ENCOUNTER — SNF VISIT (OUTPATIENT)
Dept: INTERNAL MEDICINE CLINIC | Facility: SKILLED NURSING FACILITY | Age: 87
End: 2022-05-11

## 2022-05-11 PROCEDURE — 99309 SBSQ NF CARE MODERATE MDM 30: CPT | Performed by: NURSE PRACTITIONER

## 2022-05-11 PROCEDURE — 1111F DSCHRG MED/CURRENT MED MERGE: CPT | Performed by: NURSE PRACTITIONER

## 2022-05-18 ENCOUNTER — HOSPITAL ENCOUNTER (EMERGENCY)
Facility: HOSPITAL | Age: 87
Discharge: HOME | End: 2022-05-18
Attending: EMERGENCY MEDICINE
Payer: MEDICARE

## 2022-05-18 ENCOUNTER — APPOINTMENT (OUTPATIENT)
Dept: CT IMAGING | Facility: HOSPITAL | Age: 87
End: 2022-05-18
Attending: EMERGENCY MEDICINE
Payer: MEDICARE

## 2022-05-18 VITALS
DIASTOLIC BLOOD PRESSURE: 72 MMHG | HEART RATE: 92 BPM | RESPIRATION RATE: 19 BRPM | BODY MASS INDEX: 20.49 KG/M2 | TEMPERATURE: 98 F | HEIGHT: 64 IN | SYSTOLIC BLOOD PRESSURE: 130 MMHG | OXYGEN SATURATION: 99 % | WEIGHT: 120 LBS
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
## 2022-05-18 PROCEDURE — 70450 CT HEAD/BRAIN W/O DYE: CPT | Performed by: EMERGENCY MEDICINE

## 2022-05-18 PROCEDURE — 99284 EMERGENCY DEPT VISIT MOD MDM: CPT

## 2022-05-19 NOTE — ED INITIAL ASSESSMENT (HPI)
Patient from [address] for fall. Patient was coming out of bathroom and mis stepped with her walker and fell backwards. Patient has lump to back pf her head. Patient take Eliquis , history of afib. Patient states she has pain to the back of her head, denies any other pain. Patient denies LOC, nausea, vomiting, vision changes.

## 2022-05-22 ENCOUNTER — LAB REQUISITION (OUTPATIENT)
Dept: LAB | Facility: HOSPITAL | Age: 87
End: 2022-05-22
Payer: MEDICARE

## 2022-05-22 DIAGNOSIS — C56.9 MALIGNANT NEOPLASM OF UNSPECIFIED OVARY (HCC): ICD-10-CM

## 2022-05-22 DIAGNOSIS — I13.0 HYPERTENSIVE HEART AND CHRONIC KIDNEY DISEASE WITH HEART FAILURE AND STAGE 1 THROUGH STAGE 4 CHRONIC KIDNEY DISEASE, OR UNSPECIFIED CHRONIC KIDNEY DISEASE (HCC): ICD-10-CM

## 2022-05-22 LAB
ANION GAP SERPL CALC-SCNC: 7 MMOL/L (ref 0–18)
BUN BLD-MCNC: 27 MG/DL (ref 7–18)
BUN/CREAT SERPL: 12.9 (ref 10–20)
CALCIUM BLD-MCNC: 8.7 MG/DL (ref 8.5–10.1)
CHLORIDE SERPL-SCNC: 113 MMOL/L (ref 98–112)
CO2 SERPL-SCNC: 23 MMOL/L (ref 21–32)
CREAT BLD-MCNC: 2.1 MG/DL
GLUCOSE BLD-MCNC: 98 MG/DL (ref 70–99)
OSMOLALITY SERPL CALC.SUM OF ELEC: 301 MOSM/KG (ref 275–295)
POTASSIUM SERPL-SCNC: 4.3 MMOL/L (ref 3.5–5.1)
SODIUM SERPL-SCNC: 143 MMOL/L (ref 136–145)

## 2022-05-22 PROCEDURE — 80048 BASIC METABOLIC PNL TOTAL CA: CPT | Performed by: INTERNAL MEDICINE

## 2022-05-24 ENCOUNTER — LAB REQUISITION (OUTPATIENT)
Dept: LAB | Facility: HOSPITAL | Age: 87
End: 2022-05-24
Payer: MEDICARE

## 2022-05-24 DIAGNOSIS — I50.32 CHRONIC DIASTOLIC (CONGESTIVE) HEART FAILURE (HCC): ICD-10-CM

## 2022-05-24 DIAGNOSIS — I13.0 HYPERTENSIVE HEART AND CHRONIC KIDNEY DISEASE WITH HEART FAILURE AND STAGE 1 THROUGH STAGE 4 CHRONIC KIDNEY DISEASE, OR UNSPECIFIED CHRONIC KIDNEY DISEASE (HCC): ICD-10-CM

## 2022-05-24 DIAGNOSIS — D64.81 ANEMIA DUE TO ANTINEOPLASTIC CHEMOTHERAPY (CODE): ICD-10-CM

## 2022-05-24 LAB
ANION GAP SERPL CALC-SCNC: 5 MMOL/L (ref 0–18)
BASOPHILS # BLD AUTO: 0.03 X10(3) UL (ref 0–0.2)
BASOPHILS NFR BLD AUTO: 0.8 %
BUN BLD-MCNC: 28 MG/DL (ref 7–18)
BUN/CREAT SERPL: 13.2 (ref 10–20)
CALCIUM BLD-MCNC: 8.4 MG/DL (ref 8.5–10.1)
CHLORIDE SERPL-SCNC: 116 MMOL/L (ref 98–112)
CO2 SERPL-SCNC: 24 MMOL/L (ref 21–32)
CREAT BLD-MCNC: 2.12 MG/DL
DEPRECATED RDW RBC AUTO: 94.2 FL (ref 35.1–46.3)
EOSINOPHIL # BLD AUTO: 0.14 X10(3) UL (ref 0–0.7)
EOSINOPHIL NFR BLD AUTO: 3.6 %
ERYTHROCYTE [DISTWIDTH] IN BLOOD BY AUTOMATED COUNT: 25.9 % (ref 11–15)
GLUCOSE BLD-MCNC: 87 MG/DL (ref 70–99)
HCT VFR BLD AUTO: 24.4 %
HGB BLD-MCNC: 7.3 G/DL
IMM GRANULOCYTES # BLD AUTO: 0.07 X10(3) UL (ref 0–1)
IMM GRANULOCYTES NFR BLD: 1.8 %
LYMPHOCYTES # BLD AUTO: 0.65 X10(3) UL (ref 1–4)
LYMPHOCYTES NFR BLD AUTO: 16.6 %
MCH RBC QN AUTO: 30.2 PG (ref 26–34)
MCHC RBC AUTO-ENTMCNC: 29.9 G/DL (ref 31–37)
MCV RBC AUTO: 100.8 FL
MONOCYTES # BLD AUTO: 0.44 X10(3) UL (ref 0.1–1)
MONOCYTES NFR BLD AUTO: 11.2 %
NEUTROPHILS # BLD AUTO: 2.59 X10 (3) UL (ref 1.5–7.7)
NEUTROPHILS # BLD AUTO: 2.59 X10(3) UL (ref 1.5–7.7)
NEUTROPHILS NFR BLD AUTO: 66 %
OSMOLALITY SERPL CALC.SUM OF ELEC: 305 MOSM/KG (ref 275–295)
PLATELET # BLD AUTO: 114 10(3)UL (ref 150–450)
PLATELET MORPHOLOGY: NORMAL
POTASSIUM SERPL-SCNC: 4.6 MMOL/L (ref 3.5–5.1)
RBC # BLD AUTO: 2.42 X10(6)UL
SODIUM SERPL-SCNC: 145 MMOL/L (ref 136–145)
WBC # BLD AUTO: 3.9 X10(3) UL (ref 4–11)

## 2022-05-24 PROCEDURE — 80048 BASIC METABOLIC PNL TOTAL CA: CPT | Performed by: INTERNAL MEDICINE

## 2022-05-24 PROCEDURE — 85025 COMPLETE CBC W/AUTO DIFF WBC: CPT | Performed by: INTERNAL MEDICINE

## 2022-05-28 ENCOUNTER — LAB REQUISITION (OUTPATIENT)
Dept: LAB | Facility: HOSPITAL | Age: 87
End: 2022-05-28
Payer: MEDICARE

## 2022-05-28 DIAGNOSIS — D64.81 ANEMIA DUE TO ANTINEOPLASTIC CHEMOTHERAPY (CODE): ICD-10-CM

## 2022-05-28 DIAGNOSIS — I13.0 HYPERTENSIVE HEART AND CHRONIC KIDNEY DISEASE WITH HEART FAILURE AND STAGE 1 THROUGH STAGE 4 CHRONIC KIDNEY DISEASE, OR UNSPECIFIED CHRONIC KIDNEY DISEASE (HCC): ICD-10-CM

## 2022-05-28 DIAGNOSIS — I50.32 CHRONIC DIASTOLIC (CONGESTIVE) HEART FAILURE (HCC): ICD-10-CM

## 2022-05-28 LAB
ANION GAP SERPL CALC-SCNC: 6 MMOL/L (ref 0–18)
BASOPHILS # BLD AUTO: 0.03 X10(3) UL (ref 0–0.2)
BASOPHILS NFR BLD AUTO: 0.7 %
BUN BLD-MCNC: 28 MG/DL (ref 7–18)
BUN/CREAT SERPL: 13.2 (ref 10–20)
CALCIUM BLD-MCNC: 8.7 MG/DL (ref 8.5–10.1)
CHLORIDE SERPL-SCNC: 113 MMOL/L (ref 98–112)
CO2 SERPL-SCNC: 24 MMOL/L (ref 21–32)
CREAT BLD-MCNC: 2.12 MG/DL
DEPRECATED RDW RBC AUTO: 96.2 FL (ref 35.1–46.3)
EOSINOPHIL # BLD AUTO: 0.24 X10(3) UL (ref 0–0.7)
EOSINOPHIL NFR BLD AUTO: 5.4 %
ERYTHROCYTE [DISTWIDTH] IN BLOOD BY AUTOMATED COUNT: 26.2 % (ref 11–15)
GLUCOSE BLD-MCNC: 101 MG/DL (ref 70–99)
HCT VFR BLD AUTO: 25.6 %
HGB BLD-MCNC: 7.7 G/DL
IMM GRANULOCYTES # BLD AUTO: 0.08 X10(3) UL (ref 0–1)
IMM GRANULOCYTES NFR BLD: 1.8 %
LYMPHOCYTES # BLD AUTO: 0.94 X10(3) UL (ref 1–4)
LYMPHOCYTES NFR BLD AUTO: 21.1 %
MCH RBC QN AUTO: 30.9 PG (ref 26–34)
MCHC RBC AUTO-ENTMCNC: 30.1 G/DL (ref 31–37)
MCV RBC AUTO: 102.8 FL
MONOCYTES # BLD AUTO: 0.56 X10(3) UL (ref 0.1–1)
MONOCYTES NFR BLD AUTO: 12.6 %
NEUTROPHILS # BLD AUTO: 2.61 X10 (3) UL (ref 1.5–7.7)
NEUTROPHILS # BLD AUTO: 2.61 X10(3) UL (ref 1.5–7.7)
NEUTROPHILS NFR BLD AUTO: 58.4 %
OSMOLALITY SERPL CALC.SUM OF ELEC: 302 MOSM/KG (ref 275–295)
PLATELET # BLD AUTO: 142 10(3)UL (ref 150–450)
PLATELET MORPHOLOGY: NORMAL
POTASSIUM SERPL-SCNC: 4.5 MMOL/L (ref 3.5–5.1)
RBC # BLD AUTO: 2.49 X10(6)UL
SODIUM SERPL-SCNC: 143 MMOL/L (ref 136–145)
WBC # BLD AUTO: 4.5 X10(3) UL (ref 4–11)

## 2022-05-28 PROCEDURE — 80048 BASIC METABOLIC PNL TOTAL CA: CPT | Performed by: INTERNAL MEDICINE

## 2022-05-28 PROCEDURE — 85025 COMPLETE CBC W/AUTO DIFF WBC: CPT | Performed by: INTERNAL MEDICINE

## 2022-05-31 ENCOUNTER — APPOINTMENT (OUTPATIENT)
Dept: CT IMAGING | Facility: HOSPITAL | Age: 87
End: 2022-05-31
Attending: EMERGENCY MEDICINE
Payer: MEDICARE

## 2022-05-31 ENCOUNTER — HOSPITAL ENCOUNTER (EMERGENCY)
Facility: HOSPITAL | Age: 87
Discharge: HOME | End: 2022-05-31
Attending: EMERGENCY MEDICINE
Payer: MEDICARE

## 2022-05-31 ENCOUNTER — APPOINTMENT (OUTPATIENT)
Dept: GENERAL RADIOLOGY | Facility: HOSPITAL | Age: 87
End: 2022-05-31
Attending: EMERGENCY MEDICINE
Payer: MEDICARE

## 2022-05-31 VITALS
RESPIRATION RATE: 20 BRPM | HEART RATE: 102 BPM | BODY MASS INDEX: 27.48 KG/M2 | DIASTOLIC BLOOD PRESSURE: 106 MMHG | TEMPERATURE: 98 F | SYSTOLIC BLOOD PRESSURE: 153 MMHG | HEIGHT: 60 IN | OXYGEN SATURATION: 93 % | WEIGHT: 140 LBS
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
## 2022-05-31 PROCEDURE — 72125 CT NECK SPINE W/O DYE: CPT | Performed by: EMERGENCY MEDICINE

## 2022-05-31 PROCEDURE — 70450 CT HEAD/BRAIN W/O DYE: CPT | Performed by: EMERGENCY MEDICINE

## 2022-05-31 PROCEDURE — 73502 X-RAY EXAM HIP UNI 2-3 VIEWS: CPT | Performed by: EMERGENCY MEDICINE

## 2022-05-31 PROCEDURE — 99284 EMERGENCY DEPT VISIT MOD MDM: CPT

## 2022-05-31 NOTE — ED INITIAL ASSESSMENT (HPI)
Pt to ED via [facility]. Pt here with c/o mechanical fall. Pt on Eliquis. Pt has a hx of frequent falls. C-collar in place due to unwitnessed fall. No LOC. No dizziness prior to fall, per pt. A&O4.

## 2022-06-08 ENCOUNTER — LAB ENCOUNTER (OUTPATIENT)
Dept: LAB | Facility: HOSPITAL | Age: 87
End: 2022-06-08
Payer: MEDICARE

## 2022-06-08 DIAGNOSIS — N18.32 CHRONIC KIDNEY DISEASE (CKD) STAGE G3B/A1, MODERATELY DECREASED GLOMERULAR FILTRATION RATE (GFR) BETWEEN 30-44 ML/MIN/1.73 SQUARE METER AND ALBUMINURIA CREATININE RATIO LESS THAN 30 MG/G (HCC): Primary | ICD-10-CM

## 2022-06-08 LAB
ANION GAP SERPL CALC-SCNC: 8 MMOL/L (ref 0–18)
BUN BLD-MCNC: 36 MG/DL (ref 7–18)
BUN/CREAT SERPL: 15.1 (ref 10–20)
CALCIUM BLD-MCNC: 9.1 MG/DL (ref 8.5–10.1)
CHLORIDE SERPL-SCNC: 106 MMOL/L (ref 98–112)
CO2 SERPL-SCNC: 27 MMOL/L (ref 21–32)
CREAT BLD-MCNC: 2.39 MG/DL
FASTING STATUS PATIENT QL REPORTED: YES
GLUCOSE BLD-MCNC: 88 MG/DL (ref 70–99)
OSMOLALITY SERPL CALC.SUM OF ELEC: 300 MOSM/KG (ref 275–295)
POTASSIUM SERPL-SCNC: 4 MMOL/L (ref 3.5–5.1)
SODIUM SERPL-SCNC: 141 MMOL/L (ref 136–145)

## 2022-06-08 PROCEDURE — 80048 BASIC METABOLIC PNL TOTAL CA: CPT

## 2023-01-01 ENCOUNTER — APPOINTMENT (OUTPATIENT)
Dept: GENERAL RADIOLOGY | Facility: HOSPITAL | Age: 88
End: 2023-01-01
Attending: HOSPITALIST
Payer: MEDICARE

## 2023-01-01 ENCOUNTER — APPOINTMENT (OUTPATIENT)
Dept: CT IMAGING | Facility: HOSPITAL | Age: 88
End: 2023-01-01
Attending: INTERNAL MEDICINE
Payer: MEDICARE

## 2023-01-01 ENCOUNTER — HOSPITAL ENCOUNTER (INPATIENT)
Facility: HOSPITAL | Age: 88
LOS: 8 days | Discharge: HOSPICE | End: 2023-01-01
Attending: EMERGENCY MEDICINE | Admitting: HOSPITALIST
Payer: MEDICARE

## 2023-01-01 ENCOUNTER — APPOINTMENT (OUTPATIENT)
Dept: INTERVENTIONAL RADIOLOGY/VASCULAR | Facility: HOSPITAL | Age: 88
End: 2023-01-01
Attending: EMERGENCY MEDICINE
Payer: MEDICARE

## 2023-01-01 ENCOUNTER — APPOINTMENT (OUTPATIENT)
Dept: CV DIAGNOSTICS | Facility: HOSPITAL | Age: 88
End: 2023-01-01
Attending: INTERNAL MEDICINE
Payer: MEDICARE

## 2023-01-01 ENCOUNTER — APPOINTMENT (OUTPATIENT)
Dept: ULTRASOUND IMAGING | Facility: HOSPITAL | Age: 88
End: 2023-01-01
Attending: STUDENT IN AN ORGANIZED HEALTH CARE EDUCATION/TRAINING PROGRAM
Payer: MEDICARE

## 2023-01-01 ENCOUNTER — APPOINTMENT (OUTPATIENT)
Dept: CT IMAGING | Facility: HOSPITAL | Age: 88
End: 2023-01-01
Attending: EMERGENCY MEDICINE
Payer: MEDICARE

## 2023-01-01 ENCOUNTER — HOSPITAL ENCOUNTER (INPATIENT)
Facility: HOSPITAL | Age: 88
LOS: 1 days | End: 2023-01-01
Attending: HOSPITALIST | Admitting: HOSPITALIST
Payer: OTHER MISCELLANEOUS

## 2023-01-01 ENCOUNTER — APPOINTMENT (OUTPATIENT)
Dept: ULTRASOUND IMAGING | Facility: HOSPITAL | Age: 88
End: 2023-01-01
Attending: INTERNAL MEDICINE
Payer: MEDICARE

## 2023-01-01 VITALS
RESPIRATION RATE: 10 BRPM | HEART RATE: 117 BPM | OXYGEN SATURATION: 78 % | DIASTOLIC BLOOD PRESSURE: 44 MMHG | SYSTOLIC BLOOD PRESSURE: 65 MMHG | TEMPERATURE: 98 F

## 2023-01-01 VITALS
SYSTOLIC BLOOD PRESSURE: 95 MMHG | HEIGHT: 60 IN | BODY MASS INDEX: 28.22 KG/M2 | WEIGHT: 143.75 LBS | RESPIRATION RATE: 12 BRPM | OXYGEN SATURATION: 98 % | TEMPERATURE: 97 F | HEART RATE: 108 BPM | DIASTOLIC BLOOD PRESSURE: 65 MMHG

## 2023-01-01 DIAGNOSIS — T45.1X5A CHEMOTHERAPY-INDUCED NEUTROPENIA: ICD-10-CM

## 2023-01-01 DIAGNOSIS — D70.1 CHEMOTHERAPY-INDUCED NEUTROPENIA: ICD-10-CM

## 2023-01-01 DIAGNOSIS — N18.6 END STAGE RENAL DISEASE (HCC): Primary | ICD-10-CM

## 2023-01-01 LAB
ALBUMIN SERPL-MCNC: 1.4 G/DL (ref 3.4–5)
ALBUMIN SERPL-MCNC: 1.4 G/DL (ref 3.4–5)
ALBUMIN SERPL-MCNC: 1.6 G/DL (ref 3.4–5)
ALBUMIN SERPL-MCNC: 2.1 G/DL (ref 3.4–5)
ALBUMIN SERPL-MCNC: 2.3 G/DL (ref 3.4–5)
ALBUMIN SERPL-MCNC: 2.8 G/DL (ref 3.4–5)
ALBUMIN/GLOB SERPL: 0.4 {RATIO} (ref 1–2)
ALP LIVER SERPL-CCNC: 127 U/L
ALP LIVER SERPL-CCNC: 73 U/L
ALT SERPL-CCNC: 23 U/L
ALT SERPL-CCNC: 25 U/L
ANION GAP SERPL CALC-SCNC: 10 MMOL/L (ref 0–18)
ANION GAP SERPL CALC-SCNC: 11 MMOL/L (ref 0–18)
ANION GAP SERPL CALC-SCNC: 11 MMOL/L (ref 0–18)
ANION GAP SERPL CALC-SCNC: 15 MMOL/L (ref 0–18)
ANION GAP SERPL CALC-SCNC: 6 MMOL/L (ref 0–18)
ANION GAP SERPL CALC-SCNC: 6 MMOL/L (ref 0–18)
ANION GAP SERPL CALC-SCNC: 7 MMOL/L (ref 0–18)
ANION GAP SERPL CALC-SCNC: 8 MMOL/L (ref 0–18)
ANTIBODY SCREEN: NEGATIVE
AST SERPL-CCNC: 34 U/L (ref 15–37)
AST SERPL-CCNC: 47 U/L (ref 15–37)
BASE EXCESS BLD CALC-SCNC: -3.5 MMOL/L (ref ?–2)
BASE EXCESS BLD CALC-SCNC: -8 MMOL/L (ref ?–2)
BASOPHILS # BLD AUTO: 0.02 X10(3) UL (ref 0–0.2)
BASOPHILS # BLD AUTO: 0.03 X10(3) UL (ref 0–0.2)
BASOPHILS # BLD AUTO: 0.03 X10(3) UL (ref 0–0.2)
BASOPHILS # BLD: 0 X10(3) UL (ref 0–0.2)
BASOPHILS # BLD: 0.03 X10(3) UL (ref 0–0.2)
BASOPHILS NFR BLD AUTO: 0.7 %
BASOPHILS NFR BLD AUTO: 0.7 %
BASOPHILS NFR BLD AUTO: 1 %
BASOPHILS NFR BLD: 0 %
BASOPHILS NFR BLD: 1 %
BILIRUB DIRECT SERPL-MCNC: <0.1 MG/DL (ref 0–0.2)
BILIRUB SERPL-MCNC: 0.3 MG/DL (ref 0.1–2)
BILIRUB SERPL-MCNC: 0.4 MG/DL (ref 0.1–2)
BILIRUB UR QL: NEGATIVE
BILIRUB UR QL: NEGATIVE
BLACTX-M ISLT/SPM QL: NOT DETECTED
BLOOD GAS EPAP: 5 CM H2O
BLOOD GAS IPAP: 12 CM H2O
BLOOD TYPE BARCODE: 5100
BUN BLD-MCNC: 122 MG/DL (ref 7–18)
BUN BLD-MCNC: 39 MG/DL (ref 7–18)
BUN BLD-MCNC: 48 MG/DL (ref 7–18)
BUN BLD-MCNC: 56 MG/DL (ref 7–18)
BUN BLD-MCNC: 64 MG/DL (ref 7–18)
BUN BLD-MCNC: 71 MG/DL (ref 7–18)
BUN BLD-MCNC: 78 MG/DL (ref 7–18)
BUN BLD-MCNC: 79 MG/DL (ref 7–18)
BUN BLD-MCNC: 79 MG/DL (ref 7–18)
BUN BLD-MCNC: 90 MG/DL (ref 7–18)
BUN BLD-MCNC: 96 MG/DL (ref 7–18)
BUN/CREAT SERPL: 18.6 (ref 10–20)
BUN/CREAT SERPL: 18.7 (ref 10–20)
BUN/CREAT SERPL: 19.3 (ref 10–20)
BUN/CREAT SERPL: 20.1 (ref 10–20)
BUN/CREAT SERPL: 20.3 (ref 10–20)
BUN/CREAT SERPL: 21 (ref 10–20)
BUN/CREAT SERPL: 21.4 (ref 10–20)
BUN/CREAT SERPL: 23.1 (ref 10–20)
BUN/CREAT SERPL: 24.9 (ref 10–20)
BUN/CREAT SERPL: 28.9 (ref 10–20)
BUN/CREAT SERPL: 33.9 (ref 10–20)
CA-I BLD-SCNC: 1.03 MMOL/L (ref 0.95–1.32)
CALCIUM BLD-MCNC: 7.7 MG/DL (ref 8.5–10.1)
CALCIUM BLD-MCNC: 7.8 MG/DL (ref 8.5–10.1)
CALCIUM BLD-MCNC: 7.9 MG/DL (ref 8.5–10.1)
CALCIUM BLD-MCNC: 8.1 MG/DL (ref 8.5–10.1)
CALCIUM BLD-MCNC: 8.2 MG/DL (ref 8.5–10.1)
CALCIUM BLD-MCNC: 8.3 MG/DL (ref 8.5–10.1)
CALCIUM BLD-MCNC: 8.4 MG/DL (ref 8.5–10.1)
CHLORIDE SERPL-SCNC: 101 MMOL/L (ref 98–112)
CHLORIDE SERPL-SCNC: 105 MMOL/L (ref 98–112)
CHLORIDE SERPL-SCNC: 106 MMOL/L (ref 98–112)
CHLORIDE SERPL-SCNC: 107 MMOL/L (ref 98–112)
CHLORIDE SERPL-SCNC: 107 MMOL/L (ref 98–112)
CHLORIDE SERPL-SCNC: 108 MMOL/L (ref 98–112)
CHLORIDE SERPL-SCNC: 109 MMOL/L (ref 98–112)
CHLORIDE SERPL-SCNC: 109 MMOL/L (ref 98–112)
CHLORIDE SERPL-SCNC: 112 MMOL/L (ref 98–112)
CHOLEST SERPL-MCNC: 72 MG/DL (ref ?–200)
CLARITY UR: CLEAR
CO2 SERPL-SCNC: 19 MMOL/L (ref 21–32)
CO2 SERPL-SCNC: 19 MMOL/L (ref 21–32)
CO2 SERPL-SCNC: 21 MMOL/L (ref 21–32)
CO2 SERPL-SCNC: 22 MMOL/L (ref 21–32)
CO2 SERPL-SCNC: 23 MMOL/L (ref 21–32)
CO2 SERPL-SCNC: 23 MMOL/L (ref 21–32)
CO2 SERPL-SCNC: 24 MMOL/L (ref 21–32)
CO2 SERPL-SCNC: 25 MMOL/L (ref 21–32)
CO2 SERPL-SCNC: 27 MMOL/L (ref 21–32)
COHGB MFR BLD: 1.6 % (ref 0–3)
COLOR UR: YELLOW
CREAT BLD-MCNC: 1.66 MG/DL
CREAT BLD-MCNC: 1.82 MG/DL
CREAT BLD-MCNC: 2.57 MG/DL
CREAT BLD-MCNC: 3 MG/DL
CREAT BLD-MCNC: 3.07 MG/DL
CREAT BLD-MCNC: 3.6 MG/DL
CREAT BLD-MCNC: 3.84 MG/DL
CREAT BLD-MCNC: 4.1 MG/DL
CREAT BLD-MCNC: 4.25 MG/DL
CREAT BLD-MCNC: 4.29 MG/DL
CREAT BLD-MCNC: 4.77 MG/DL
CREAT UR-SCNC: 56.6 MG/DL
DEPRECATED RDW RBC AUTO: 52.2 FL (ref 35.1–46.3)
DEPRECATED RDW RBC AUTO: 53.3 FL (ref 35.1–46.3)
DEPRECATED RDW RBC AUTO: 54.5 FL (ref 35.1–46.3)
DEPRECATED RDW RBC AUTO: 54.7 FL (ref 35.1–46.3)
DEPRECATED RDW RBC AUTO: 55.2 FL (ref 35.1–46.3)
DEPRECATED RDW RBC AUTO: 56.6 FL (ref 35.1–46.3)
DEPRECATED RDW RBC AUTO: 56.6 FL (ref 35.1–46.3)
DEPRECATED RDW RBC AUTO: 56.7 FL (ref 35.1–46.3)
DEPRECATED RDW RBC AUTO: 59.1 FL (ref 35.1–46.3)
DEPRECATED RDW RBC AUTO: 59.2 FL (ref 35.1–46.3)
DOHLE BOD BLD QL SMEAR: PRESENT
E COLI DNA BLD POS QL NAA+NON-PROBE: DETECTED
EGFRCR SERPLBLD CKD-EPI 2021: 10 ML/MIN/1.73M2 (ref 60–?)
EGFRCR SERPLBLD CKD-EPI 2021: 11 ML/MIN/1.73M2 (ref 60–?)
EGFRCR SERPLBLD CKD-EPI 2021: 11 ML/MIN/1.73M2 (ref 60–?)
EGFRCR SERPLBLD CKD-EPI 2021: 14 ML/MIN/1.73M2 (ref 60–?)
EGFRCR SERPLBLD CKD-EPI 2021: 14 ML/MIN/1.73M2 (ref 60–?)
EGFRCR SERPLBLD CKD-EPI 2021: 17 ML/MIN/1.73M2 (ref 60–?)
EGFRCR SERPLBLD CKD-EPI 2021: 26 ML/MIN/1.73M2 (ref 60–?)
EGFRCR SERPLBLD CKD-EPI 2021: 29 ML/MIN/1.73M2 (ref 60–?)
EGFRCR SERPLBLD CKD-EPI 2021: 8 ML/MIN/1.73M2 (ref 60–?)
EGFRCR SERPLBLD CKD-EPI 2021: 9 ML/MIN/1.73M2 (ref 60–?)
EGFRCR SERPLBLD CKD-EPI 2021: 9 ML/MIN/1.73M2 (ref 60–?)
EOSINOPHIL # BLD AUTO: 0 X10(3) UL (ref 0–0.7)
EOSINOPHIL # BLD: 0 X10(3) UL (ref 0–0.7)
EOSINOPHIL # BLD: 0.01 X10(3) UL (ref 0–0.7)
EOSINOPHIL NFR BLD AUTO: 0 %
EOSINOPHIL NFR BLD: 0 %
EOSINOPHIL NFR BLD: 1 %
ERYTHROCYTE [DISTWIDTH] IN BLOOD BY AUTOMATED COUNT: 15 % (ref 11–15)
ERYTHROCYTE [DISTWIDTH] IN BLOOD BY AUTOMATED COUNT: 15.4 % (ref 11–15)
ERYTHROCYTE [DISTWIDTH] IN BLOOD BY AUTOMATED COUNT: 15.5 % (ref 11–15)
ERYTHROCYTE [DISTWIDTH] IN BLOOD BY AUTOMATED COUNT: 15.5 % (ref 11–15)
ERYTHROCYTE [DISTWIDTH] IN BLOOD BY AUTOMATED COUNT: 15.9 % (ref 11–15)
ERYTHROCYTE [DISTWIDTH] IN BLOOD BY AUTOMATED COUNT: 16.2 % (ref 11–15)
ERYTHROCYTE [DISTWIDTH] IN BLOOD BY AUTOMATED COUNT: 16.2 % (ref 11–15)
ERYTHROCYTE [DISTWIDTH] IN BLOOD BY AUTOMATED COUNT: 16.5 % (ref 11–15)
ERYTHROCYTE [DISTWIDTH] IN BLOOD BY AUTOMATED COUNT: 16.5 % (ref 11–15)
ERYTHROCYTE [DISTWIDTH] IN BLOOD BY AUTOMATED COUNT: 17 % (ref 11–15)
GLOBULIN PLAS-MCNC: 4 G/DL (ref 2.8–4.4)
GLUCOSE BLD-MCNC: 100 MG/DL (ref 70–99)
GLUCOSE BLD-MCNC: 111 MG/DL (ref 70–99)
GLUCOSE BLD-MCNC: 118 MG/DL (ref 70–99)
GLUCOSE BLD-MCNC: 127 MG/DL (ref 70–99)
GLUCOSE BLD-MCNC: 192 MG/DL (ref 70–99)
GLUCOSE BLD-MCNC: 77 MG/DL (ref 70–99)
GLUCOSE BLD-MCNC: 83 MG/DL (ref 70–99)
GLUCOSE BLD-MCNC: 87 MG/DL (ref 70–99)
GLUCOSE BLD-MCNC: 90 MG/DL (ref 70–99)
GLUCOSE BLD-MCNC: 99 MG/DL (ref 70–99)
GLUCOSE BLD-MCNC: 99 MG/DL (ref 70–99)
GLUCOSE BLDC GLUCOMTR-MCNC: 183 MG/DL (ref 70–99)
GLUCOSE UR-MCNC: 50 MG/DL
GLUCOSE UR-MCNC: NORMAL MG/DL
HBV SURFACE AG SER-ACNC: 0.32 [IU]/L
HBV SURFACE AG SERPL QL IA: NONREACTIVE
HCO3 BLDA-SCNC: 18.7 MEQ/L (ref 21–27)
HCO3 BLDA-SCNC: 22.2 MEQ/L (ref 21–27)
HCT VFR BLD AUTO: 20.1 %
HCT VFR BLD AUTO: 21.8 %
HCT VFR BLD AUTO: 23 %
HCT VFR BLD AUTO: 23 %
HCT VFR BLD AUTO: 23.6 %
HCT VFR BLD AUTO: 24.6 %
HCT VFR BLD AUTO: 25.1 %
HCT VFR BLD AUTO: 25.4 %
HCT VFR BLD AUTO: 27.7 %
HCT VFR BLD AUTO: 28.3 %
HCT VFR BLD AUTO: 33.7 %
HCT VFR BLD AUTO: 38.7 %
HDLC SERPL-MCNC: 12 MG/DL (ref 40–59)
HGB BLD-MCNC: 11.1 G/DL
HGB BLD-MCNC: 11.2 G/DL
HGB BLD-MCNC: 13 G/DL
HGB BLD-MCNC: 6.6 G/DL
HGB BLD-MCNC: 7.2 G/DL
HGB BLD-MCNC: 7.3 G/DL
HGB BLD-MCNC: 7.3 G/DL
HGB BLD-MCNC: 7.6 G/DL
HGB BLD-MCNC: 7.9 G/DL
HGB BLD-MCNC: 7.9 G/DL
HGB BLD-MCNC: 8 G/DL
HGB BLD-MCNC: 8 G/DL
HGB BLD-MCNC: 8.4 G/DL
HGB BLD-MCNC: 8.4 G/DL
HGB BLD-MCNC: 9.5 G/DL
HGB BLD-MCNC: 9.6 G/DL
IMM GRANULOCYTES # BLD AUTO: 0.01 X10(3) UL (ref 0–1)
IMM GRANULOCYTES # BLD AUTO: 0.04 X10(3) UL (ref 0–1)
IMM GRANULOCYTES # BLD AUTO: 0.07 X10(3) UL (ref 0–1)
IMM GRANULOCYTES NFR BLD: 0.4 %
IMM GRANULOCYTES NFR BLD: 1.3 %
IMM GRANULOCYTES NFR BLD: 1.5 %
KETONES UR-MCNC: NEGATIVE MG/DL
KETONES UR-MCNC: NEGATIVE MG/DL
LACTATE BLD-SCNC: 2.2 MMOL/L (ref 0.5–2)
LACTATE SERPL-SCNC: 1.5 MMOL/L (ref 0.4–2)
LACTATE SERPL-SCNC: 2.5 MMOL/L (ref 0.4–2)
LDLC SERPL CALC-MCNC: 25 MG/DL (ref ?–100)
LEUKOCYTE ESTERASE UR QL STRIP.AUTO: 500
LEUKOCYTE ESTERASE UR QL STRIP.AUTO: NEGATIVE
LYMPHOCYTES # BLD AUTO: 0.09 X10(3) UL (ref 1–4)
LYMPHOCYTES # BLD AUTO: 0.35 X10(3) UL (ref 1–4)
LYMPHOCYTES # BLD AUTO: 0.64 X10(3) UL (ref 1–4)
LYMPHOCYTES NFR BLD AUTO: 11.4 %
LYMPHOCYTES NFR BLD AUTO: 14.1 %
LYMPHOCYTES NFR BLD AUTO: 3.3 %
LYMPHOCYTES NFR BLD: 0.05 X10(3) UL (ref 1–4)
LYMPHOCYTES NFR BLD: 0.1 X10(3) UL (ref 1–4)
LYMPHOCYTES NFR BLD: 0.12 X10(3) UL (ref 1–4)
LYMPHOCYTES NFR BLD: 0.21 X10(3) UL (ref 1–4)
LYMPHOCYTES NFR BLD: 0.26 X10(3) UL (ref 1–4)
LYMPHOCYTES NFR BLD: 16 %
LYMPHOCYTES NFR BLD: 3 %
LYMPHOCYTES NFR BLD: 6 %
LYMPHOCYTES NFR BLD: 60 %
LYMPHOCYTES NFR BLD: 8 %
MAGNESIUM SERPL-MCNC: 1.7 MG/DL (ref 1.6–2.6)
MAGNESIUM SERPL-MCNC: 1.8 MG/DL (ref 1.6–2.6)
MAGNESIUM SERPL-MCNC: 1.8 MG/DL (ref 1.6–2.6)
MAGNESIUM SERPL-MCNC: 1.9 MG/DL (ref 1.6–2.6)
MCH RBC QN AUTO: 31.7 PG (ref 26–34)
MCH RBC QN AUTO: 32 PG (ref 26–34)
MCH RBC QN AUTO: 32.1 PG (ref 26–34)
MCH RBC QN AUTO: 32.2 PG (ref 26–34)
MCH RBC QN AUTO: 32.2 PG (ref 26–34)
MCH RBC QN AUTO: 32.3 PG (ref 26–34)
MCH RBC QN AUTO: 32.7 PG (ref 26–34)
MCH RBC QN AUTO: 33 PG (ref 26–34)
MCHC RBC AUTO-ENTMCNC: 32.5 G/DL (ref 31–37)
MCHC RBC AUTO-ENTMCNC: 33 G/DL (ref 31–37)
MCHC RBC AUTO-ENTMCNC: 33.1 G/DL (ref 31–37)
MCHC RBC AUTO-ENTMCNC: 33.2 G/DL (ref 31–37)
MCHC RBC AUTO-ENTMCNC: 33.5 G/DL (ref 31–37)
MCHC RBC AUTO-ENTMCNC: 33.9 G/DL (ref 31–37)
MCHC RBC AUTO-ENTMCNC: 34.3 G/DL (ref 31–37)
MCHC RBC AUTO-ENTMCNC: 34.3 G/DL (ref 31–37)
MCV RBC AUTO: 93.9 FL
MCV RBC AUTO: 95.3 FL
MCV RBC AUTO: 95.5 FL
MCV RBC AUTO: 95.8 FL
MCV RBC AUTO: 95.8 FL
MCV RBC AUTO: 96 FL
MCV RBC AUTO: 96.2 FL
MCV RBC AUTO: 97 FL
MCV RBC AUTO: 98.3 FL
MCV RBC AUTO: 98.8 FL
METAMYELOCYTES # BLD: 0.04 X10(3) UL
METAMYELOCYTES # BLD: 0.05 X10(3) UL
METAMYELOCYTES # BLD: 0.06 X10(3) UL
METAMYELOCYTES NFR BLD: 1 %
METAMYELOCYTES NFR BLD: 4 %
METAMYELOCYTES NFR BLD: 8 %
METHGB MFR BLD: 1.2 % SAT (ref 0.4–1.5)
MODIFIED ALLEN TEST: POSITIVE
MODIFIED ALLEN TEST: POSITIVE
MONOCYTES # BLD AUTO: 0.07 X10(3) UL (ref 0.1–1)
MONOCYTES # BLD AUTO: 0.08 X10(3) UL (ref 0.1–1)
MONOCYTES # BLD AUTO: 0.11 X10(3) UL (ref 0.1–1)
MONOCYTES # BLD: 0.02 X10(3) UL (ref 0.1–1)
MONOCYTES # BLD: 0.03 X10(3) UL (ref 0.1–1)
MONOCYTES # BLD: 0.06 X10(3) UL (ref 0.1–1)
MONOCYTES # BLD: 0.07 X10(3) UL (ref 0.1–1)
MONOCYTES # BLD: 0.17 X10(3) UL (ref 0.1–1)
MONOCYTES NFR BLD AUTO: 2.3 %
MONOCYTES NFR BLD AUTO: 2.4 %
MONOCYTES NFR BLD AUTO: 2.9 %
MONOCYTES NFR BLD: 1 %
MONOCYTES NFR BLD: 1 %
MONOCYTES NFR BLD: 2 %
MONOCYTES NFR BLD: 28 %
MONOCYTES NFR BLD: 29 %
MORPHOLOGY: NORMAL
MYELOCYTES # BLD: 0.02 X10(3) UL
MYELOCYTES NFR BLD: 1 %
NEUTROPHILS # BLD AUTO: 0.03 X10 (3) UL (ref 1.5–7.7)
NEUTROPHILS # BLD AUTO: 0.31 X10 (3) UL (ref 1.5–7.7)
NEUTROPHILS # BLD AUTO: 1.31 X10 (3) UL (ref 1.5–7.7)
NEUTROPHILS # BLD AUTO: 2.53 X10 (3) UL (ref 1.5–7.7)
NEUTROPHILS # BLD AUTO: 2.53 X10(3) UL (ref 1.5–7.7)
NEUTROPHILS # BLD AUTO: 2.57 X10 (3) UL (ref 1.5–7.7)
NEUTROPHILS # BLD AUTO: 2.57 X10(3) UL (ref 1.5–7.7)
NEUTROPHILS # BLD AUTO: 2.81 X10 (3) UL (ref 1.5–7.7)
NEUTROPHILS # BLD AUTO: 3.09 X10 (3) UL (ref 1.5–7.7)
NEUTROPHILS # BLD AUTO: 3.68 X10 (3) UL (ref 1.5–7.7)
NEUTROPHILS # BLD AUTO: 3.68 X10(3) UL (ref 1.5–7.7)
NEUTROPHILS NFR BLD AUTO: 81.3 %
NEUTROPHILS NFR BLD AUTO: 84 %
NEUTROPHILS NFR BLD AUTO: 92.7 %
NEUTROPHILS NFR BLD: 12 %
NEUTROPHILS NFR BLD: 31 %
NEUTROPHILS NFR BLD: 78 %
NEUTROPHILS NFR BLD: 85 %
NEUTROPHILS NFR BLD: 85 %
NEUTS BAND NFR BLD: 13 %
NEUTS BAND NFR BLD: 15 %
NEUTS BAND NFR BLD: 5 %
NEUTS BAND NFR BLD: 6 %
NEUTS HYPERSEG # BLD: 0.02 X10(3) UL (ref 1.5–7.7)
NEUTS HYPERSEG # BLD: 0.28 X10(3) UL (ref 1.5–7.7)
NEUTS HYPERSEG # BLD: 1.46 X10(3) UL (ref 1.5–7.7)
NEUTS HYPERSEG # BLD: 2.88 X10(3) UL (ref 1.5–7.7)
NEUTS HYPERSEG # BLD: 3.19 X10(3) UL (ref 1.5–7.7)
NEUTS VAC BLD QL SMEAR: PRESENT
NITRITE UR QL STRIP.AUTO: NEGATIVE
NONHDLC SERPL-MCNC: 60 MG/DL (ref ?–130)
NRBC BLD MANUAL-RTO: 1 %
NRBC BLD MANUAL-RTO: 2 %
O2 CT BLD-SCNC: 15.1 VOL% (ref 15–23)
O2 CT BLD-SCNC: 18.4 VOL% (ref 15–23)
O2/TOTAL GAS SETTING VFR VENT: 60 %
OSMOLALITY SERPL CALC.SUM OF ELEC: 298 MOSM/KG (ref 275–295)
OSMOLALITY SERPL CALC.SUM OF ELEC: 300 MOSM/KG (ref 275–295)
OSMOLALITY SERPL CALC.SUM OF ELEC: 303 MOSM/KG (ref 275–295)
OSMOLALITY SERPL CALC.SUM OF ELEC: 304 MOSM/KG (ref 275–295)
OSMOLALITY SERPL CALC.SUM OF ELEC: 305 MOSM/KG (ref 275–295)
OSMOLALITY SERPL CALC.SUM OF ELEC: 306 MOSM/KG (ref 275–295)
OSMOLALITY SERPL CALC.SUM OF ELEC: 310 MOSM/KG (ref 275–295)
OSMOLALITY SERPL CALC.SUM OF ELEC: 316 MOSM/KG (ref 275–295)
OSMOLALITY SERPL CALC.SUM OF ELEC: 316 MOSM/KG (ref 275–295)
OSMOLALITY SERPL CALC.SUM OF ELEC: 317 MOSM/KG (ref 275–295)
OSMOLALITY SERPL CALC.SUM OF ELEC: 328 MOSM/KG (ref 275–295)
OXYGEN LITERS/MINUTE: 10 L/MIN
PCO2 BLDA: 27 MM HG (ref 35–45)
PCO2 BLDA: 36 MM HG (ref 35–45)
PH BLDA: 7.3 [PH] (ref 7.35–7.45)
PH BLDA: 7.46 [PH] (ref 7.35–7.45)
PH UR: 5.5 [PH] (ref 5–8)
PH UR: 6 [PH] (ref 5–8)
PHOSPHATE SERPL-MCNC: 2.1 MG/DL (ref 2.5–4.9)
PHOSPHATE SERPL-MCNC: 3.4 MG/DL (ref 2.5–4.9)
PHOSPHATE SERPL-MCNC: 5.9 MG/DL (ref 2.5–4.9)
PHOSPHATE SERPL-MCNC: 6.1 MG/DL (ref 2.5–4.9)
PHOSPHATE SERPL-MCNC: 7.8 MG/DL (ref 2.5–4.9)
PLATELET # BLD AUTO: 149 10(3)UL (ref 150–450)
PLATELET # BLD AUTO: 24 10(3)UL (ref 150–450)
PLATELET # BLD AUTO: 29 10(3)UL (ref 150–450)
PLATELET # BLD AUTO: 33 10(3)UL (ref 150–450)
PLATELET # BLD AUTO: 39 10(3)UL (ref 150–450)
PLATELET # BLD AUTO: 44 10(3)UL (ref 150–450)
PLATELET # BLD AUTO: 52 10(3)UL (ref 150–450)
PLATELET # BLD AUTO: 58 10(3)UL (ref 150–450)
PLATELET # BLD AUTO: 60 10(3)UL (ref 150–450)
PLATELET # BLD AUTO: 99 10(3)UL (ref 150–450)
PLATELET MORPHOLOGY: NORMAL
PO2 BLDA: 104 MM HG (ref 80–100)
PO2 BLDA: 246 MM HG (ref 80–100)
POTASSIUM BLD-SCNC: 4.3 MMOL/L (ref 3.6–5.1)
POTASSIUM SERPL-SCNC: 3.3 MMOL/L (ref 3.5–5.1)
POTASSIUM SERPL-SCNC: 3.5 MMOL/L (ref 3.5–5.1)
POTASSIUM SERPL-SCNC: 3.5 MMOL/L (ref 3.5–5.1)
POTASSIUM SERPL-SCNC: 4 MMOL/L (ref 3.5–5.1)
POTASSIUM SERPL-SCNC: 4 MMOL/L (ref 3.5–5.1)
POTASSIUM SERPL-SCNC: 4.1 MMOL/L (ref 3.5–5.1)
POTASSIUM SERPL-SCNC: 4.4 MMOL/L (ref 3.5–5.1)
POTASSIUM SERPL-SCNC: 4.4 MMOL/L (ref 3.5–5.1)
POTASSIUM SERPL-SCNC: 4.8 MMOL/L (ref 3.5–5.1)
POTASSIUM SERPL-SCNC: 5 MMOL/L (ref 3.5–5.1)
POTASSIUM SERPL-SCNC: 5.2 MMOL/L (ref 3.5–5.1)
PROT SERPL-MCNC: 5.6 G/DL (ref 6.4–8.2)
PROT SERPL-MCNC: 6.6 G/DL (ref 6.4–8.2)
PROT UR-MCNC: 100 MG/DL
PROT UR-MCNC: 100 MG/DL
PTH-INTACT SERPL-MCNC: 437.2 PG/ML (ref 18.5–88)
PUNCTURE CHARGE: YES
PUNCTURE CHARGE: YES
Q-T INTERVAL: 372 MS
Q-T INTERVAL: 394 MS
QRS DURATION: 134 MS
QRS DURATION: 138 MS
QTC CALCULATION (BEZET): 484 MS
QTC CALCULATION (BEZET): 500 MS
R AXIS: -6 DEGREES
R AXIS: 61 DEGREES
RBC # BLD AUTO: 2.27 X10(6)UL
RBC # BLD AUTO: 2.37 X10(6)UL
RBC # BLD AUTO: 2.45 X10(6)UL
RBC # BLD AUTO: 2.47 X10(6)UL
RBC # BLD AUTO: 2.49 X10(6)UL
RBC # BLD AUTO: 2.62 X10(6)UL
RBC # BLD AUTO: 2.65 X10(6)UL
RBC # BLD AUTO: 2.88 X10(6)UL
RBC # BLD AUTO: 2.97 X10(6)UL
RBC # BLD AUTO: 3.43 X10(6)UL
RH BLOOD TYPE: POSITIVE
SAO2 % BLDA: 98.9 % (ref 94–100)
SAO2 % BLDA: >99 % (ref 94–100)
SARS-COV-2 RNA RESP QL NAA+PROBE: NOT DETECTED
SODIUM BLD-SCNC: 130 MMOL/L (ref 135–145)
SODIUM SERPL-SCNC: 136 MMOL/L (ref 136–145)
SODIUM SERPL-SCNC: 136 MMOL/L (ref 136–145)
SODIUM SERPL-SCNC: 137 MMOL/L (ref 136–145)
SODIUM SERPL-SCNC: 138 MMOL/L (ref 136–145)
SODIUM SERPL-SCNC: 139 MMOL/L (ref 136–145)
SODIUM SERPL-SCNC: 139 MMOL/L (ref 136–145)
SODIUM SERPL-SCNC: 140 MMOL/L (ref 136–145)
SODIUM SERPL-SCNC: 141 MMOL/L (ref 136–145)
SODIUM SERPL-SCNC: 142 MMOL/L (ref 136–145)
SODIUM SERPL-SCNC: 38 MMOL/L
SP GR UR STRIP: 1.01 (ref 1–1.03)
SP GR UR STRIP: 1.02 (ref 1–1.03)
T AXIS: -58 DEGREES
T AXIS: 104 DEGREES
TOTAL CELLS COUNTED BLD: 100
TOXIC GRANULES BLD QL SMEAR: PRESENT
TRIGL SERPL-MCNC: 226 MG/DL (ref 30–149)
TROPONIN I HIGH SENSITIVITY: 117 NG/L
TROPONIN I HIGH SENSITIVITY: 122 NG/L
TROPONIN I HIGH SENSITIVITY: 125 NG/L
TSI SER-ACNC: 0.96 MIU/ML (ref 0.36–3.74)
UNIT VOLUME: 350 ML
UROBILINOGEN UR STRIP-ACNC: NORMAL
UROBILINOGEN UR STRIP-ACNC: NORMAL
VENTRICULAR RATE: 102 BPM
VENTRICULAR RATE: 97 BPM
VIT D+METAB SERPL-MCNC: 32.4 NG/ML (ref 30–100)
VLDLC SERPL CALC-MCNC: 30 MG/DL (ref 0–30)
WBC # BLD AUTO: 0.2 X10(3) UL (ref 4–11)
WBC # BLD AUTO: 0.6 X10(3) UL (ref 4–11)
WBC # BLD AUTO: 1.6 X10(3) UL (ref 4–11)
WBC # BLD AUTO: 2.7 X10(3) UL (ref 4–11)
WBC # BLD AUTO: 3.1 X10(3) UL (ref 4–11)
WBC # BLD AUTO: 3.2 X10(3) UL (ref 4–11)
WBC # BLD AUTO: 3.5 X10(3) UL (ref 4–11)
WBC # BLD AUTO: 4.1 X10(3) UL (ref 4–11)
WBC # BLD AUTO: 4.5 X10(3) UL (ref 4–11)
WBC # BLD AUTO: 4.8 X10(3) UL (ref 4–11)

## 2023-01-01 PROCEDURE — 99233 SBSQ HOSP IP/OBS HIGH 50: CPT | Performed by: NURSE PRACTITIONER

## 2023-01-01 PROCEDURE — 99223 1ST HOSP IP/OBS HIGH 75: CPT | Performed by: NURSE PRACTITIONER

## 2023-01-01 PROCEDURE — 30233N1 TRANSFUSION OF NONAUTOLOGOUS RED BLOOD CELLS INTO PERIPHERAL VEIN, PERCUTANEOUS APPROACH: ICD-10-PCS | Performed by: INTERNAL MEDICINE

## 2023-01-01 PROCEDURE — 02HV33Z INSERTION OF INFUSION DEVICE INTO SUPERIOR VENA CAVA, PERCUTANEOUS APPROACH: ICD-10-PCS | Performed by: RADIOLOGY

## 2023-01-01 PROCEDURE — 74176 CT ABD & PELVIS W/O CONTRAST: CPT | Performed by: INTERNAL MEDICINE

## 2023-01-01 PROCEDURE — 93306 TTE W/DOPPLER COMPLETE: CPT | Performed by: INTERNAL MEDICINE

## 2023-01-01 PROCEDURE — 93970 EXTREMITY STUDY: CPT | Performed by: INTERNAL MEDICINE

## 2023-01-01 PROCEDURE — 70450 CT HEAD/BRAIN W/O DYE: CPT | Performed by: INTERNAL MEDICINE

## 2023-01-01 PROCEDURE — 71045 X-RAY EXAM CHEST 1 VIEW: CPT | Performed by: HOSPITALIST

## 2023-01-01 PROCEDURE — 5A09357 ASSISTANCE WITH RESPIRATORY VENTILATION, LESS THAN 24 CONSECUTIVE HOURS, CONTINUOUS POSITIVE AIRWAY PRESSURE: ICD-10-PCS | Performed by: INTERNAL MEDICINE

## 2023-01-01 PROCEDURE — 99232 SBSQ HOSP IP/OBS MODERATE 35: CPT | Performed by: NURSE PRACTITIONER

## 2023-01-01 PROCEDURE — 5A1D70Z PERFORMANCE OF URINARY FILTRATION, INTERMITTENT, LESS THAN 6 HOURS PER DAY: ICD-10-PCS | Performed by: INTERNAL MEDICINE

## 2023-01-01 PROCEDURE — 70450 CT HEAD/BRAIN W/O DYE: CPT | Performed by: EMERGENCY MEDICINE

## 2023-01-01 PROCEDURE — 76770 US EXAM ABDO BACK WALL COMP: CPT | Performed by: STUDENT IN AN ORGANIZED HEALTH CARE EDUCATION/TRAINING PROGRAM

## 2023-01-01 RX ORDER — HEPARIN SODIUM 1000 [USP'U]/ML
INJECTION, SOLUTION INTRAVENOUS; SUBCUTANEOUS
Status: COMPLETED
Start: 2023-01-01 | End: 2023-01-01

## 2023-01-01 RX ORDER — MELATONIN
325
Status: DISCONTINUED | OUTPATIENT
Start: 2023-01-01 | End: 2023-01-01

## 2023-01-01 RX ORDER — METOCLOPRAMIDE HYDROCHLORIDE 5 MG/ML
5 INJECTION INTRAMUSCULAR; INTRAVENOUS EVERY 8 HOURS PRN
Status: DISCONTINUED | OUTPATIENT
Start: 2023-01-01 | End: 2023-01-01

## 2023-01-01 RX ORDER — MORPHINE SULFATE 2 MG/ML
0.5 INJECTION, SOLUTION INTRAMUSCULAR; INTRAVENOUS EVERY 2 HOUR PRN
Status: DISCONTINUED | OUTPATIENT
Start: 2023-01-01 | End: 2023-01-01

## 2023-01-01 RX ORDER — ONDANSETRON 4 MG/1
4 TABLET, ORALLY DISINTEGRATING ORAL EVERY 6 HOURS PRN
Status: DISCONTINUED | OUTPATIENT
Start: 2023-01-01 | End: 2023-01-01

## 2023-01-01 RX ORDER — POTASSIUM CHLORIDE 20 MEQ/1
40 TABLET, EXTENDED RELEASE ORAL ONCE
Status: COMPLETED | OUTPATIENT
Start: 2023-01-01 | End: 2023-01-01

## 2023-01-01 RX ORDER — HYDROMORPHONE HYDROCHLORIDE 1 MG/ML
0.4 INJECTION, SOLUTION INTRAMUSCULAR; INTRAVENOUS; SUBCUTANEOUS EVERY 2 HOUR PRN
Status: DISCONTINUED | OUTPATIENT
Start: 2023-01-01 | End: 2023-01-01

## 2023-01-01 RX ORDER — ACETAMINOPHEN 500 MG
1000 TABLET ORAL 3 TIMES DAILY
Status: DISCONTINUED | OUTPATIENT
Start: 2023-01-01 | End: 2023-01-01

## 2023-01-01 RX ORDER — SCOLOPAMINE TRANSDERMAL SYSTEM 1 MG/1
1 PATCH, EXTENDED RELEASE TRANSDERMAL
Status: DISCONTINUED | OUTPATIENT
Start: 2023-01-01 | End: 2023-01-01

## 2023-01-01 RX ORDER — MULTIVIT WITH CALCIUM,IRON,MIN
1 TABLET ORAL DAILY
COMMUNITY

## 2023-01-01 RX ORDER — MORPHINE SULFATE 2 MG/ML
1 INJECTION, SOLUTION INTRAMUSCULAR; INTRAVENOUS EVERY 2 HOUR PRN
Status: DISCONTINUED | OUTPATIENT
Start: 2023-01-01 | End: 2023-01-01

## 2023-01-01 RX ORDER — GLYCOPYRROLATE 0.2 MG/ML
0.4 INJECTION, SOLUTION INTRAMUSCULAR; INTRAVENOUS
Status: DISCONTINUED | OUTPATIENT
Start: 2023-01-01 | End: 2023-01-01

## 2023-01-01 RX ORDER — ATROPINE SULFATE 10 MG/ML
2 SOLUTION/ DROPS OPHTHALMIC EVERY 2 HOUR PRN
Status: DISCONTINUED | OUTPATIENT
Start: 2023-01-01 | End: 2023-01-01

## 2023-01-01 RX ORDER — ONDANSETRON 2 MG/ML
4 INJECTION INTRAMUSCULAR; INTRAVENOUS EVERY 6 HOURS PRN
Status: DISCONTINUED | OUTPATIENT
Start: 2023-01-01 | End: 2023-01-01

## 2023-01-01 RX ORDER — POLYETHYLENE GLYCOL 3350 17 G/17G
17 POWDER, FOR SOLUTION ORAL DAILY PRN
Status: DISCONTINUED | OUTPATIENT
Start: 2023-01-01 | End: 2023-01-01

## 2023-01-01 RX ORDER — HYDROMORPHONE HYDROCHLORIDE 1 MG/ML
0.2 INJECTION, SOLUTION INTRAMUSCULAR; INTRAVENOUS; SUBCUTANEOUS EVERY 2 HOUR PRN
Status: DISCONTINUED | OUTPATIENT
Start: 2023-01-01 | End: 2023-01-01

## 2023-01-01 RX ORDER — LORAZEPAM 2 MG/ML
2 INJECTION INTRAMUSCULAR EVERY 4 HOURS PRN
Status: DISCONTINUED | OUTPATIENT
Start: 2023-01-01 | End: 2023-01-01

## 2023-01-01 RX ORDER — FUROSEMIDE 40 MG/1
40 TABLET ORAL EVERY 8 HOURS PRN
Status: DISCONTINUED | OUTPATIENT
Start: 2023-01-01 | End: 2023-01-01

## 2023-01-01 RX ORDER — ACETAMINOPHEN 500 MG
500 TABLET ORAL EVERY 4 HOURS PRN
Status: DISCONTINUED | OUTPATIENT
Start: 2023-01-01 | End: 2023-01-01

## 2023-01-01 RX ORDER — HALOPERIDOL 5 MG/ML
1 INJECTION INTRAMUSCULAR
Status: DISCONTINUED | OUTPATIENT
Start: 2023-01-01 | End: 2023-01-01

## 2023-01-01 RX ORDER — ACETAMINOPHEN 650 MG/1
650 SUPPOSITORY RECTAL EVERY 4 HOURS PRN
Status: DISCONTINUED | OUTPATIENT
Start: 2023-01-01 | End: 2023-01-01

## 2023-01-01 RX ORDER — LEVOTHYROXINE SODIUM 0.07 MG/1
75 TABLET ORAL
Status: DISCONTINUED | OUTPATIENT
Start: 2023-01-01 | End: 2023-01-01

## 2023-01-01 RX ORDER — MORPHINE SULFATE 2 MG/ML
2 INJECTION, SOLUTION INTRAMUSCULAR; INTRAVENOUS EVERY 2 HOUR PRN
Status: DISCONTINUED | OUTPATIENT
Start: 2023-01-01 | End: 2023-01-01

## 2023-01-01 RX ORDER — HEPARIN SODIUM 1000 [USP'U]/ML
2000 INJECTION, SOLUTION INTRAVENOUS; SUBCUTANEOUS ONCE
Status: COMPLETED | OUTPATIENT
Start: 2023-01-01 | End: 2023-01-01

## 2023-01-01 RX ORDER — SODIUM CHLORIDE 0.9 % (FLUSH) 0.9 %
10 SYRINGE (ML) INJECTION AS NEEDED
Status: DISCONTINUED | OUTPATIENT
Start: 2023-01-01 | End: 2023-01-01

## 2023-01-01 RX ORDER — ACETAMINOPHEN 160 MG/5ML
650 SOLUTION ORAL EVERY 4 HOURS PRN
Status: DISCONTINUED | OUTPATIENT
Start: 2023-01-01 | End: 2023-01-01

## 2023-01-01 RX ORDER — GLYCOPYRROLATE 0.2 MG/ML
0.2 INJECTION, SOLUTION INTRAMUSCULAR; INTRAVENOUS EVERY 6 HOURS PRN
Status: DISCONTINUED | OUTPATIENT
Start: 2023-01-01 | End: 2023-01-01

## 2023-01-01 RX ORDER — FUROSEMIDE 10 MG/ML
40 INJECTION INTRAMUSCULAR; INTRAVENOUS ONCE
Status: COMPLETED | OUTPATIENT
Start: 2023-01-01 | End: 2023-01-01

## 2023-01-01 RX ORDER — OXYCODONE HYDROCHLORIDE 5 MG/1
2.5 TABLET ORAL EVERY 6 HOURS PRN
Status: DISCONTINUED | OUTPATIENT
Start: 2023-01-01 | End: 2023-01-01

## 2023-01-01 RX ORDER — LIDOCAINE HYDROCHLORIDE 20 MG/ML
INJECTION, SOLUTION EPIDURAL; INFILTRATION; INTRACAUDAL; PERINEURAL
Status: COMPLETED
Start: 2023-01-01 | End: 2023-01-01

## 2023-01-01 RX ORDER — MORPHINE SULFATE 2 MG/ML
1 INJECTION, SOLUTION INTRAMUSCULAR; INTRAVENOUS
Status: DISCONTINUED | OUTPATIENT
Start: 2023-01-01 | End: 2023-01-01

## 2023-01-01 RX ORDER — DILTIAZEM HYDROCHLORIDE 120 MG/1
120 CAPSULE, EXTENDED RELEASE ORAL DAILY
Status: DISCONTINUED | OUTPATIENT
Start: 2023-01-01 | End: 2023-01-01

## 2023-01-01 RX ORDER — GABAPENTIN 100 MG/1
100 CAPSULE ORAL DAILY
Status: DISCONTINUED | OUTPATIENT
Start: 2023-01-01 | End: 2023-01-01

## 2023-01-01 RX ORDER — HALOPERIDOL 5 MG/ML
2 INJECTION INTRAMUSCULAR
Status: DISCONTINUED | OUTPATIENT
Start: 2023-01-01 | End: 2023-01-01

## 2023-01-01 RX ORDER — TORSEMIDE 20 MG/1
20 TABLET ORAL 2 TIMES DAILY
COMMUNITY

## 2023-01-01 RX ORDER — GABAPENTIN 100 MG/1
100 CAPSULE ORAL 3 TIMES DAILY
Status: DISCONTINUED | OUTPATIENT
Start: 2023-01-01 | End: 2023-01-01

## 2023-01-01 RX ORDER — PREDNISONE 5 MG/1
10 TABLET ORAL DAILY
COMMUNITY

## 2023-01-01 RX ORDER — BISACODYL 10 MG
10 SUPPOSITORY, RECTAL RECTAL
Status: DISCONTINUED | OUTPATIENT
Start: 2023-01-01 | End: 2023-01-01

## 2023-01-01 RX ORDER — MORPHINE SULFATE 4 MG/ML
4 INJECTION, SOLUTION INTRAMUSCULAR; INTRAVENOUS EVERY 2 HOUR PRN
Status: DISCONTINUED | OUTPATIENT
Start: 2023-01-01 | End: 2023-01-01

## 2023-01-01 RX ORDER — ACETAMINOPHEN 500 MG
1000 TABLET ORAL 3 TIMES DAILY PRN
Status: DISCONTINUED | OUTPATIENT
Start: 2023-01-01 | End: 2023-01-01

## 2023-01-01 RX ORDER — LORAZEPAM 2 MG/ML
0.5 INJECTION INTRAMUSCULAR EVERY 4 HOURS PRN
Status: DISCONTINUED | OUTPATIENT
Start: 2023-01-01 | End: 2023-01-01

## 2023-01-01 RX ORDER — HYDROMORPHONE HYDROCHLORIDE 1 MG/ML
0.1 INJECTION, SOLUTION INTRAMUSCULAR; INTRAVENOUS; SUBCUTANEOUS EVERY 2 HOUR PRN
Status: DISCONTINUED | OUTPATIENT
Start: 2023-01-01 | End: 2023-01-01

## 2023-01-01 RX ORDER — SODIUM CHLORIDE 9 MG/ML
INJECTION, SOLUTION INTRAVENOUS ONCE
Status: COMPLETED | OUTPATIENT
Start: 2023-01-01 | End: 2023-01-01

## 2023-01-01 RX ORDER — LORAZEPAM 2 MG/ML
1 INJECTION INTRAMUSCULAR EVERY 4 HOURS PRN
Status: DISCONTINUED | OUTPATIENT
Start: 2023-01-01 | End: 2023-01-01

## 2023-01-01 RX ORDER — METOPROLOL TARTRATE 5 MG/5ML
5 INJECTION INTRAVENOUS EVERY 6 HOURS PRN
Status: DISCONTINUED | OUTPATIENT
Start: 2023-01-01 | End: 2023-01-01

## 2023-01-01 RX ORDER — FUROSEMIDE 10 MG/ML
40 INJECTION INTRAMUSCULAR; INTRAVENOUS EVERY 8 HOURS PRN
Status: DISCONTINUED | OUTPATIENT
Start: 2023-01-01 | End: 2023-01-01

## 2023-01-01 RX ORDER — SODIUM CHLORIDE 9 MG/ML
INJECTION, SOLUTION INTRAVENOUS CONTINUOUS
Status: DISCONTINUED | OUTPATIENT
Start: 2023-01-01 | End: 2023-01-01

## 2023-09-08 PROBLEM — R41.82 ALTERED MENTAL STATUS: Status: ACTIVE | Noted: 2023-01-01

## 2023-09-08 PROBLEM — N18.6 END STAGE RENAL DISEASE (HCC): Status: ACTIVE | Noted: 2023-01-01

## 2023-09-08 PROBLEM — D70.1 CHEMOTHERAPY-INDUCED NEUTROPENIA: Status: ACTIVE | Noted: 2023-01-01

## 2023-09-08 PROBLEM — T45.1X5A CHEMOTHERAPY-INDUCED NEUTROPENIA: Status: ACTIVE | Noted: 2023-01-01

## 2023-09-11 PROBLEM — M89.8X9 BONE PAIN DUE TO G-CSF: Status: ACTIVE | Noted: 2023-01-01

## 2023-09-12 PROBLEM — Z71.89 COUNSELING REGARDING ADVANCE CARE PLANNING AND GOALS OF CARE: Status: ACTIVE | Noted: 2023-01-01

## 2023-09-12 PROBLEM — R53.83 FATIGUE: Status: ACTIVE | Noted: 2023-01-01

## 2023-09-12 PROBLEM — Z51.5 PALLIATIVE CARE ENCOUNTER: Status: ACTIVE | Noted: 2023-01-01

## 2023-09-12 PROBLEM — G89.3 NEOPLASM RELATED PAIN (ACUTE) (CHRONIC): Status: ACTIVE | Noted: 2023-01-01

## 2023-09-14 PROBLEM — R65.21 SEPTIC SHOCK DUE TO ESCHERICHIA COLI (HCC): Status: ACTIVE | Noted: 2023-01-01

## 2023-09-14 PROBLEM — A41.51 SEPTIC SHOCK DUE TO ESCHERICHIA COLI (HCC): Status: ACTIVE | Noted: 2023-01-01

## 2023-09-15 PROBLEM — R52 GENERALIZED PAIN: Status: ACTIVE | Noted: 2023-01-01

## 2023-09-15 PROBLEM — R09.89 TERMINAL RESPIRATORY SECRETIONS: Status: ACTIVE | Noted: 2023-01-01

## 2024-12-20 NOTE — PROGRESS NOTES
ASSESSMENT/PLAN:     Impression: 1. 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 She has a mildly elevated BNP but I do not think she has heart failure  2. Paroxysmal atrial fib.   She is now in an ectop Mother       Family History of Premature CAD: No   Social History:  Social History    Tobacco Use      Smoking status: Never Smoker      Smokeless tobacco: Never Used    Vaping Use      Vaping Use: Never used    Alcohol use: Not Currently      Alcohol/week [de-identified] : The patient is a 63 year  old right hand dominant female who presents today complaining of right shoulder pain.  Date of Injury/Onset: 9/2024, worsening since 11/21/24 Pain:    At Rest: 1/10  With Activity:  9-10/10  Mechanism of injury: Gradual onset of pain with lifting her grandson - worsening since falling onto the shoulder on a grassy surface  This is NOT a Work Related Injury being treated under Worker's Compensation. This is NOT an athletic injury occurring associated with an interscholastic or organized sports team. Quality of symptoms: lateral shoulder pain that travels to the neck and to elbow (sometimes wrist has h/o neck fusion), clicking/cracking, limited ROM, weakness Improves with: rest, motrin  Worse with: abduction  Treatment/Imaging/Studies Since Last Visit: MRI 	Reports Available For Review Today: MRI @ [facility] 12/9/24 Changes in last visit; patient reports dec in pain since last visit, but still has discomfort during the night side sleeping or laying on her back. C/o intermittent swelling and a constant ache. Interested in repeat CSI since meloxicam aggravates her stomach and has success with pain relief in 2021. 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 H/O C5-6 discectomy/fusion 2011 with radicular symptoms.

## (undated) DIAGNOSIS — D64.9 ANEMIA, UNSPECIFIED TYPE: ICD-10-CM

## (undated) DIAGNOSIS — D69.6 THROMBOCYTOPENIA (HCC): ICD-10-CM

## (undated) DIAGNOSIS — N30.00 ACUTE CYSTITIS WITHOUT HEMATURIA: ICD-10-CM

## (undated) DIAGNOSIS — I13.0 HYPERTENSIVE HEART AND CHRONIC KIDNEY DISEASE WITH HEART FAILURE AND STAGE 1 THROUGH STAGE 4 CHRONIC KIDNEY DISEASE, OR UNSPECIFIED CHRONIC KIDNEY DISEASE (HCC): ICD-10-CM

## (undated) DIAGNOSIS — C56.9 MALIGNANT NEOPLASM OF UNSPECIFIED OVARY (HCC): ICD-10-CM

## (undated) DIAGNOSIS — D50.9 IRON DEFICIENCY ANEMIA, UNSPECIFIED: ICD-10-CM

## (undated) DIAGNOSIS — I48.91 ATRIAL FIBRILLATION WITH RAPID VENTRICULAR RESPONSE (HCC): ICD-10-CM

## (undated) DIAGNOSIS — C78.6 PERITONEAL CARCINOMATOSIS (HCC): ICD-10-CM

## (undated) DIAGNOSIS — I10 ESSENTIAL HYPERTENSION, BENIGN: ICD-10-CM

## (undated) DIAGNOSIS — Z90.710 S/P HYSTERECTOMY: ICD-10-CM

## (undated) DIAGNOSIS — C78.6 CARCINOMATOSIS PERITONEI (HCC): ICD-10-CM

## (undated) DIAGNOSIS — I50.9 ACUTE ON CHRONIC CONGESTIVE HEART FAILURE, UNSPECIFIED HEART FAILURE TYPE (HCC): ICD-10-CM

## (undated) DIAGNOSIS — N18.32 STAGE 3B CHRONIC KIDNEY DISEASE (HCC): ICD-10-CM

## (undated) DIAGNOSIS — A04.72 ENTEROCOLITIS DUE TO CLOSTRIDIUM DIFFICILE, NOT SPECIFIED AS RECURRENT: ICD-10-CM

## (undated) DIAGNOSIS — K59.00 CONSTIPATION, UNSPECIFIED CONSTIPATION TYPE: ICD-10-CM
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
## (undated) DEVICE — MINOR GENERAL: Brand: MEDLINE INDUSTRIES, INC.

## (undated) DEVICE — 3M™ IOBAN™ 2 ANTIMICROBIAL INCISE DRAPE 6650EZ: Brand: IOBAN™ 2

## (undated) DEVICE — LAPAROVUE VISIBILITY SYSTEM LAPAROSCOPIC SOLUTIONS: Brand: LAPAROVUE

## (undated) DEVICE — TRAP MCS 40ML 5IN PLS SCR CAP

## (undated) DEVICE — 12 ML SYRINGE LUER-LOCK TIP: Brand: MONOJECT

## (undated) DEVICE — CONMED SCOPE SAVER BITE BLOCK, 20X27 MM: Brand: SCOPE SAVER

## (undated) DEVICE — SYSTEM SURGYPAD VELCRO 36IN

## (undated) DEVICE — CANNULA SEAL

## (undated) DEVICE — 20 ML SYRINGE LUER-LOCK TIP: Brand: MONOJECT

## (undated) DEVICE — SUTURE VLOC 180 0 12" 0316

## (undated) DEVICE — Device: Brand: CUSTOM PROCEDURE KIT

## (undated) DEVICE — TRI-LUMEN FILTERED TUBE SET WITH ACTIVATED CHARCOAL FILTER: Brand: AIRSEAL

## (undated) DEVICE — COLUMN DRAPE

## (undated) DEVICE — ARM DRAPE

## (undated) DEVICE — EXOFIN TISSUE ADHESIVE 1.0ML

## (undated) DEVICE — ABSORBABLE HEMOSTAT (OXIDIZED REGENERATED CELLULOSE, U.S.P.): Brand: SURGICEL

## (undated) DEVICE — SUCTION CANISTER, 3000CC,SAFELINER: Brand: DEROYAL

## (undated) DEVICE — STERILE SURGICAL LUBRICANT, METAL TUBE: Brand: SURGILUBE

## (undated) DEVICE — GAMMEX® PI HYBRID SIZE 8, STERILE POWDER-FREE SURGICAL GLOVE, POLYISOPRENE AND NEOPRENE BLEND: Brand: GAMMEX

## (undated) DEVICE — DRAPE SHEET LAVH 124X112X30

## (undated) DEVICE — LINE MNTR ADLT SET O2 INTMD

## (undated) DEVICE — FENESTRATED BIPOLAR FORCEPS: Brand: ENDOWRIST

## (undated) DEVICE — GAMMEX® PI HYBRID SIZE 7.5, STERILE POWDER-FREE SURGICAL GLOVE, POLYISOPRENE AND NEOPRENE BLEND: Brand: GAMMEX

## (undated) DEVICE — DISPOSABLE SUCTION/IRRIGATOR TUBE SET: Brand: AHTO

## (undated) DEVICE — AIRSEAL 5 MM ACCESS PORT AND LOW PROFILE OBTURATOR WITH BLADELESS OPTICAL TIP, 120 MM LENGTH: Brand: AIRSEAL

## (undated) DEVICE — BLADELESS OBTURATOR: Brand: WECK VISTA

## (undated) DEVICE — VIOLET BRAIDED (POLYGLACTIN 910), SYNTHETIC ABSORBABLE SUTURE: Brand: COATED VICRYL

## (undated) DEVICE — CAUTERY BLADE 2IN INS E1455

## (undated) DEVICE — UNDYED BRAIDED (POLYGLACTIN 910), SYNTHETIC ABSORBABLE SUTURE: Brand: COATED VICRYL

## (undated) DEVICE — MEDI-VAC NON-CONDUCTIVE SUCTION TUBING 6MM X 1.8M (6FT.) L: Brand: CARDINAL HEALTH

## (undated) DEVICE — C-ARM: Brand: UNBRANDED

## (undated) DEVICE — MEGA NEEDLE DRIVER: Brand: ENDOWRIST

## (undated) DEVICE — STERILE (10.2 X 147CM) TELESCOPICALLY-FOLDED COVER: Brand: CIV-FLEX™ TRANSDUCER COVER

## (undated) DEVICE — ARISTA AH ABSORBABLE HEMOSTATIC PARTICLES: Brand: ARISTA™ AH

## (undated) DEVICE — DRAPE SHEET TRANSVERSE LAP

## (undated) DEVICE — SOL  .9 1000ML BAG

## (undated) DEVICE — 3M™ IOBAN™ 2 ANTIMICROBIAL INCISE DRAPE 6640EZ: Brand: IOBAN™ 2

## (undated) DEVICE — VCARE SMALL, UTERINE MANIPULATOR, VAGINAL-CERVICAL-AHLUWALIA'S-RETRACTOR-ELEVATOR: Brand: VCARE

## (undated) DEVICE — 6 ML SYRINGE LUER-LOCK TIP: Brand: MONOJECT

## (undated) DEVICE — Device: Brand: DEFENDO AIR/WATER/SUCTION AND BIOPSY VALVE

## (undated) DEVICE — ENSEAL X1 TISSUE SEALER, CURVED JAW, 37 CM SHAFT LENGTH: Brand: ENSEAL

## (undated) DEVICE — SOL  .9 1000ML BTL

## (undated) DEVICE — MONOPOLAR CURVED SCISSORS: Brand: ENDOWRIST

## (undated) DEVICE — TRAY SKIN PREP PVP-1

## (undated) DEVICE — INSUFFLATION NEEDLE TO ESTABLISH PNEUMOPERITONEUM.: Brand: INSUFFLATION NEEDLE

## (undated) DEVICE — SUTURE PROLENE 3-0 SH

## (undated) DEVICE — ANCHOR TISSUE RETRIEVAL SYSTEM, BAG SIZE 1200 ML, PORT SIZE 15 MM: Brand: ANCHOR TISSUE RETRIEVAL SYSTEM

## (undated) DEVICE — NEEDLE HPO 18GA 1.5IN ECLPS

## (undated) DEVICE — PROGRASP FORCEPS: Brand: ENDOWRIST

## (undated) DEVICE — ROBOTIC: Brand: MEDLINE INDUSTRIES, INC.

## (undated) DEVICE — TIP COVER ACCESSORY

## (undated) DEVICE — ENCORE® LATEX ACCLAIM SIZE 8, STERILE LATEX POWDER-FREE SURGICAL GLOVE: Brand: ENCORE

## (undated) DEVICE — SUTURE MONOCRYL 4-0 PS-2

## (undated) DEVICE — 3 ML SYRINGE LUER-LOCK TIP: Brand: MONOJECT

## (undated) DEVICE — STERILE LATEX POWDER-FREE SURGICAL GLOVESWITH NITRILE COATING: Brand: PROTEXIS

## (undated) DEVICE — ELECTRO LUBE IS A SINGLE PATIENT USE DEVICE THAT IS INTENDED TO BE USED ON ELECTROSURGICAL ELECTRODES TO REDUCE STICKING.: Brand: KEY SURGICAL ELECTRO LUBE

## (undated) DEVICE — ANCHOR TISSUE RETRIEVAL SYSTEM, BAG SIZE 125 ML, PORT SIZE 8 MM: Brand: ANCHOR TISSUE RETRIEVAL SYSTEM

## (undated) DEVICE — 35 ML SYRINGE REGULAR TIP: Brand: MONOJECT

## (undated) DEVICE — CONTAINER SPEC STR 4OZ GRY LID

## (undated) NOTE — LETTER
[address] [place] [address]      Authorization for Surgical Operation and Procedure     Date:___________                                                                                                         Time:_______ products. The following are some, but not all, of the potential risks that can occur: fever and allergic reactions, hemolytic reactions, transmission of diseases such as Hepatitis, AIDS and Cytomegalovirus (CMV) and fluid overload.   In the event that I wi on my behalf). The surgeon or my attending physician will determine when the applicable recovery period ends for purposes of reinstating the DNAR order.   10. Patients having a sterilization procedure: I understand that if the procedure is successful the re a discussion with my patient.     _______________________________________________________________ _____________________________  [facility]                                                                                         (Date)

## (undated) NOTE — IP AVS SNAPSHOT
Patient Demographics     Address  [address] [place] [address]  [address] [address] Phone  [phone] [facility])  [phone] (Mobile) *Preferred*      Emergency Contact(s)     Name Relation Home Work Mobile    Yuri Friend [phone]        Al tartrate 25 MG Tabs  Commonly known as: LOPRESSOR  Next dose due: This evening      Take 1 tablet (25 mg total) by mouth 2x Daily(Beta Blocker).    Suzie Damico MD         ondansetron 8 MG tablet  Commonly known as: ZOFRAN      Take 1 tablet (8 mg tota (NEURONTIN) cap 100 mg 12/02/21 1600 Given      [id number] gabapentin (NEURONTIN) cap 100 mg 12/02/21 2213 Given      [id number] gabapentin (NEURONTIN) cap 100 mg 12/03/21 0908 Given      [id number] levothyroxine (SYNTHROID) tab 75 mcg 12/03/21 0613 Given Platelet.   Procedure                               Abnormality         Status                     ---------                               -----------         ------                     CBC W/ DIFFERENTIAL[[id number]]          Abnormal            Final resul MD  12/02/21 2300 Resulting lab: [facility])    Specimen Information    Type Source Collected On   Blood — 12/03/21 0633          Components    Component Value Reference Range Flag Lab   Glucose 90 70 - 99 mg/dL — David (Normal) Collected: 11/29/21 1430    Order Status: Completed Lab Status: Final result Updated: 11/29/21 6793    Specimen: Other from Nares      Rapid SARS-CoV-2 by PCR Not Detected      Pending Labs     Order Current Status    Blood Culture Preliminary res medications have been marked as taking for the 11/29/21 encounter [facility] HOSPITAL Encounter).         Soc Hx  Social History    Tobacco Use      Smoking status: Never Smoker      Smokeless tobacco: Never Used    Alcohol use: Not Currently      Alcohol/week: 1.0 s HTN, Hypothyroidism who presented with b/l lower extremity swelling, redness and warmth.   Admitted for cellulitis and neutropenia s/p chemo.      Cellulitis  BL LE  Neutropenia ()  - swelling, redness, warmth   - no fevers, chills  - f/u CXR and UA Signed    : Larry Quinn MD (Physician)     Consult Orders    1.  Consult to Cardiology [[id number]] ordered by Etelvina Layne MD at 12/02/21 Tracey Bee is a 80 year ol Other (Other) Mother       Social History:  Social History    Tobacco Use      Smoking status: Never Smoker      Smokeless tobacco: Never Used    Vaping Use      Vaping Use: Never used    Alcohol use: Not Currently      Alcohol/week: 1.0 standard drink depression  ENDOCRINE: no change in sleep pattern  HEME: pancytopenia  All other systems reviewed and negative.           EXAM:         TELE: AF          BP (!) 143/94 (BP Location: Right arm)   Pulse 92   Temp 98.8 °F (37.1 °C) (Oral)   Resp 18   Wt 134 lb hours. Recent Labs   Lab 12/02/21  0544   RBC 3.08*   HGB 9.4*   HCT 29.6*   MCV 96.1   MCH 30.5   MCHC 31.8   RDW 18.0*   NEPRELIM 1.28*   WBC 2.6*   .0*         Imaging:  No results found.          Jobie Rubinstein, MD                      Electro warmth.  Admitted for cellulitis and neutropenia s/p chemo, improved with cefepime and vancomycin. ID consulted, plan for cephalexin on discharge for 10 more days. Neutropenia improved, afebrile.  Plan for SNF on discharge.      Cellulitis- improving  BL LE Subpleural right lower lobe solid 2 mm nodule also unchanged, likely incidental.  No new or enlarging pulmonary nodules are noted.   No convincing evidence for metastatic disease to the chest.  2.  Improvement in the nodular omental thickening seen on prior (three) times daily.      levothyroxine 50 MCG Tabs  Commonly known as: SYNTHROID  Take 1.5 tablets (75 mcg total) by mouth before breakfast.     metoprolol tartrate 25 MG Tabs  Commonly known as: LOPRESSOR  Take 1 tablet (25 mg total) by mouth 2x Daily(Bet Physical Therapy Notes (last 72 hours)      Physical Therapy Note signed by Chantel Reyez PT at 12/2/2021  3:48 PM  Version 1 of 1    Author: Chantel Reyez PT Service: Rehab Author Type: Physical Therapist    Filed: 12/2/2021  3:48 PM Date of Ser hurting, left foot most of all. She needs mod A to stand from bed and stabilize and balance, feels she is falling backwards. Assist to bring body over feet and allow stretch to calf.  She then needs mod A to transfer to chair with RW and cues for staying in Laterality Date   • CATARACT      bilateral   • COLONOSCOPY N/A 8/31/2021    Procedure: COLONOSCOPY;  Surgeon: Keturah Catalan MD;  Location: [address] [place] [address] ENDOSCOPY   • COLONOSCOPY     • OTHER Right 09/2021    Port insertion   • PARTIAL EXCISION THYROID,UNILAT '6-Clicks' INPATIENT SHORT FORM - BASIC MOBILITY  How much difficulty does the patient currently have. ..   Patient Difficulty: Turning over in bed (including adjusting bedclothes, sheets and blankets)?: A Little   Patient Difficulty: Sitting down on and sta minimum assistance   Goal #3   Current Status    Goal #4 Patient will negotiate transfers bed to/from chair with a RW and minimal assistance   Goal #4   Current Status    Goal #5 Patient to demonstrate independence with home activity/exercise instructions for this encounter.      Immunizations     Name Date      [address] 09/01/21     Covid-19 Pfizer 02/05/21     Covid-19 Pfizer 01/16/21     Depo-Medrol 40mg Inj 08/25/20     INFLUENZA 11/16/21     INFLUENZA 11/05/20     INFLUENZA 11/05/20     INFLUENZA

## (undated) NOTE — LETTER
[facility]  Administration of Anesthesia  1. Lela Cadena, or _________________________________ acting on her behalf, (Patient) (Dependent/Representative) request to receive anesthesia for my pending procedure/operation/treatment. bleeding, seizure, cardiac arrest and death. 7. AWARENESS: I understand that it is possible (but unlikely) to have explicit memory of events from the operating room while under general anesthesia.   8. ELECTROCONVULSIVE THERAPY PATIENTS: This consent serve below affirms that prior to the time of the procedure, I have explained to the patient and/or his/her guardian, the risks and benefits of undergoing anesthesia, as well as any reasonable alternatives.     ___________________________________________________

## (undated) NOTE — IP AVS SNAPSHOT
[facility]            (For Outpatient Use Only) Initial Admit Date: 11/29/2021   Inpt/Obs Admit Date: Inpt: 11/29/21 / Obs: N/A   Discharge Date:    Hospital Acct:  [de-identified]   MRN: [de-identified]   CSN: [id number]   CEID: [id number]        E Payor:  Plan:    Group Number:  Insurance Type:    Subscriber Name:  Subscriber :    Subscriber ID:  Pt Rel to Subscriber:    Hospital Account Financial Class: Medicare    December 3, 2021

## (undated) NOTE — IP AVS SNAPSHOT
[facility]            (For Outpatient Use Only) Initial Admit Date: 2/15/2022   Inpt/Obs Admit Date: Inpt: 2/15/22 / Obs: N/A   Discharge Date:    Clinton Rider:  [de-identified]   MRN: [de-identified]   CSN: [id number]   CEID: [id number]        ENCOUNTER  Patient Class: Inpatient Admitting Provider: Onesimo Almaraz MD Unit: [address] [place] [address]/SW/SE   Hospital Service: Surgical Attending Provider: Onesimo Almaraz MD   Bed: 469-A   Visit Type:   Referring Physician: No ref. provider found Billing Flag:    Admit Diagnosis: Malignant neoplasm of ovary, unspecified laterality [facility]) [C56.9]      PATIENT  Legal Name:   Michaelle Mcclelland   Legal Sex: Female  Gender ID:              [address] [facility] Name:    PCP:  Veronika Godinez MD Home: [phone]   Address:  [address] [place] [address]* : 1931 (89 yrs) Mobile: [phone]         City/State/Zip: [place] [address] Marital: Single Language: [address] [place] [address] Drive[facility] [id number] Baptist: No Baptist Given-No Ernestina*     Race: White Ethnicity: Non  Or  O*   EMERGENCY CONTACT   Name Relationship Legal Guardian? Home Phone Work Phone Mobile Phone   1.  Urszula Dunham  2. *No Contact Specified* Friend      [phone]             GUARANTOR  Guarantor: Cristo MARTIN : 1931 Home Phone: [phone]   Address: [address]  Sex: Female Work Phone:    City/State/Zip: [facility], [address] [place] [address]   Rel. to Patient: Self Guarantor ID: [id number]   GUARANTOR EMPLOYER   Employer:  Status: RETIRED     COVERAGE  PRIMARY INSURANCE   Payor: MEDICARE Plan: MEDICARE [employer]   Group Number:  Insurance Type: INDEMNITY   Subscriber Name: Olman Ramachandran : 1931   Subscriber ID: [id number] Pt Rel to Subscriber: Self   SECONDARY INSURANCE   Payor: [address] [place] [address] Drive: [address] [place] [address]   Group Number: [id number] Insurance Type: [address] Name: Olman Ramachandran : 1931   Subscriber ID: [id number] Pt Rel to Subscriber: SELF   TERTIARY INSURANCE   Payor:  Plan:    Group Number:  Insurance Type:    Subscriber Name:  Subscriber :    Subscriber ID:  Pt Rel to Subscriber:    Hospital Account Financial Class: Medicare    2022

## (undated) NOTE — IP AVS SNAPSHOT
[facility]            (For Outpatient Use Only) Initial Admit Date: 9/29/2021   Inpt/Obs Admit Date: Inpt: 9/29/21 / Obs: N/A   Discharge Date:    Skip Jeff:  [de-identified]   MRN: [de-identified]   CSN: [id number]   CEID: [id number]        [facility] TERTIARY INSURANCE   Payor:  Plan:    Group Number:  Insurance Type:    Subscriber Name:  Subscriber :    Subscriber ID:  Pt Rel to Subscriber:    Hospital Account Financial Class: Medicare    2021

## (undated) NOTE — LETTER
[facility]  Administration of Anesthesia  1. Susy Coffman, or _________________________________ acting on her behalf, (Patient) (Dependent/Representative) request to receive anesthesia for my pending procedure/operation/treatment. bleeding, seizure, cardiac arrest and death. 7. AWARENESS: I understand that it is possible (but unlikely) to have explicit memory of events from the operating room while under general anesthesia.   8. ELECTROCONVULSIVE THERAPY PATIENTS: This consent serve below affirms that prior to the time of the procedure, I have explained to the patient and/or his/her guardian, the risks and benefits of undergoing anesthesia, as well as any reasonable alternatives.     ___________________________________________________

## (undated) NOTE — IP AVS SNAPSHOT
[facility]            (For Outpatient Use Only) Initial Admit Date: 8/27/2021   Inpt/Obs Admit Date: Inpt: 8/27/21 / Obs: N/A   Discharge Date:    Kendall Coats:  [de-identified]   MRN: [de-identified]   CSN: [id number]   CEID: [id number]        [facility] Number:  Insurance Type:    Subscriber Name:  Subscriber :    Subscriber ID:  Pt Rel to Subscriber:    Hospital Account Financial Class: Medicare    September 3, 2021

## (undated) NOTE — IP AVS SNAPSHOT
Patient Demographics     Address  [address] [place], [place] [id number] [address] [address] Phone  [phone] [facility])  [phone] (Mobile) *Preferred*      Emergency Contact(s)     Name Relation Home Work Mobile    Yuri Friend [phone]        Al levothyroxine 50 MCG Tabs  Commonly known as: SYNTHROID  Next dose due: tomorrow      Take 1.5 tablets (75 mcg total) by mouth before breakfast.   Lorenzo Merrill MD         metoprolol tartrate 25 MG Tabs  Commonly known as: LOPRESSOR  Next dose du Result status: Final result   Ordering provider: Jemima Longoria DO  09/30/21 2300 Resulting lab: [facility] ([facility])   Narrative: The following orders were created for panel order CBC With Differential With Platelet.   Procedur [facility])   Potassium 4.3 3.5 - 5.1 mmol/L — [facility])   Chloride 100 98 - 112 mmol/L — [facility] ([facility])   CO2 27.0 21.0 - 32.0 mmol/L — [facility] ([facility])   Anion Gap 8 0 - 18 mmol/L — [facility] ([facility])   BUN 40 7 - 18 mg/dL [facility] Testing Performed By     Kenia Sol Name Director Address Valid Date Range    [address] [facility]) [facility]) Coy Crump. Yasmany Jacobson M.D. [facility]  [address]  [place] [address] 03/19/20 1442 - Present regularly. She had her first chemotherapy session on September 10 and since then she has been having worsening shortness of breath and lower extremity swelling as well as generalized weakness.   She denies any fevers, chills, chest pain, urinary symptoms, No conjunctival pallor, EOMs intact. Nose: Nares normal. Septum midline. Mucosa normal. No drainage.    Throat: Lips, mucosa, and tongue normal. Teeth and gums normal.   Neck: Supple, symmetrical, trachea midline   Lungs:   Clear to auscultation bilatera post IV Lasix in the ER  –Repeat TTE given recent chemotherapy; recent TTE from May suggestive of normal EF with grade 1 diastolic dysfunction  –Cardiology on consult, diuresis per cardiology and renal  –Strict ins and outs  –Monitor kidney function    CKD [phone]      Electronically signed by Kaveh Calle DO on 9/29/2021  4:15 PM              Consults - MD Consult Notes      Consults signed by Robbi Keith MD at 9/30/2021  4:08 PM     Author: Robbi Keith MD Service: Hematology/Onco status: Never Smoker      Smokeless tobacco: Never Used    Alcohol use: Not Currently      Alcohol/week: 1.0 standard drink      Types: 1 Glasses of wine per week    Medications:  • atorvastatin  10 mg Oral Nightly   • Heparin Sodium (Porcine)  5,000 Units improvement     Anemia, thrombocytopenia/leukopenia  - secondary to chemotherapy  - recommend transfusion to keep hgb > 7 and plts >10    Plan  - will follow along peripherally, please call with questions     Rosette Thomas is comfortable with my recomme estimated ejection fraction was 60%. Wall motion was    normal; there were no regional wall motion abnormalities. 2. Mitral valve: Mild to moderate regurgitation. 3. Left atrium: The atrium was moderately dilated. 4. Right ventricle:  The cavity size was mi was normal in size. Mitral valve:   Normal thickened, mildly calcified leaflets. Mobility was not restricted. Doppler: There was no evidence for stenosis. Mild to moderate regurgitation. Peak gradient (D): 4mm Hg.  Left atrium:  The atrium was mode 56 - 104  volume, Teichholz MM  LV end-diastolic       (L)     31    ml/m^2 39         35 - 75  volume/bsa, Teichholz  MM   Ventricular septum             Value        05/21/2021 Reference  IVS thickness, ED      (H)     1.3   cm     1.0        0.6 - 0.9 Value        05/21/2021 Reference  RV ID, ED, PLAX                3.1   cm     3.9        -----------  RV ID, minor axis, ED,         4.0   cm     ---------- 2.5 - 4.1  A4C base  TAPSE                  (L)     1.5   cm     ---------- 1.7 - 3.1  RV pres rest breaks every 10' due to fatigue and elevated HR. Pt ambulates with shuffled gait and decreased blaze. Seated exercises: Guided pt through seated bicep curls, overhead press, marching, knee extension, ankle pumps.    Vitals: -140 with activity have...  -   Turning over in bed (including adjusting bedclothes, sheets and blankets)?: A Little   -   Sitting down on and standing up from a chair with arms (e.g., wheelchair, bedside commode, etc.): A Little   -   Moving from lying on back to sitting on patient in preparation for discharge.    Goal #5   Current Status  in progress    Goal #6     Goal #6  Current Status              Physical Therapy Note signed by Klaudia Thompson PT at 9/30/2021  3:05 PM  Version 1 of 1    Author: Klaudia Thompson PT Service: Re meals.   Vitals: -110 at rest, 142 with activity; RN notified. Discussed discharge recommendation with patient, PT recommending sub acute rehab. Pt agreeable after education provided on rationale for rehab in the PM: KRISTOPHER messaged.      End of session taking all her medications regularly. She had her first chemotherapy session on September 10 and since then she has been having worsening shortness of breath and lower extremity swelling as well as generalized weakness.   She denies any fevers, chills, earlene things and getting meals since start of chemo. SUBJECTIVE  \"I just need you to push me to do things and then I will get up and do it. \"     PHYSICAL THERAPY EXAMINATION     OBJECTIVE  Precautions: Cardiac; Bed/chair alarm  Fall Risk: High fall risk re-educate  Posture  Strengthening  Transfer training    Patient End of Session: Up in chair; Needs met; Call light within reach; RN aware of session/findings; All patient questions and concerns addressed;  Alarm set    CURRENT GOALS    Goals to be met by: 9/30/2021  1:58 PM Status: Signed    : Funmilayo Doan OT (Occupational Therapist)       3 attempts to made to see pt for OT evaluation. Pt off floor for first attempt floor, then pt refused in am and again in pm. Nurse aware.  Plan to re-attempt t - Labs/tests as ordered   - PT/OT consult  - See additional Care Plan goals for specific interventions   Patient/Family Short Term Goal     Interdisciplinary Progressing    Description: Patient's Short Term Goal: To breath better    Interventions:   - IV

## (undated) NOTE — IP AVS SNAPSHOT
[facility]            (For Outpatient Use Only) Initial Admit Date: 2022   Inpt/Obs Admit Date: Inpt: 22 / Obs: 22   Discharge Date:    Hospital Acct:  [de-identified]   MRN: [de-identified]   CSN: [id number]   CEID: [id number]        ENCOUNTER  Patient Class: Inpatient Admitting Provider: Bozena Raman MD Unit: [address] [place] [address]//   Hospital Service: Oncology Attending Provider: Bozena Raman MD   Bed: 439-A   Visit Type:   Referring Physician: No ref. provider found Billing Flag:    Admit Diagnosis: Malignant neoplasm of ovary, unspecified laterality [facility]) [C56.9]      PATIENT  Legal Name:   Ilya Moran   Legal Sex: Female  Gender ID:              [address] [facility] Floor Name:    PCP:  Orlin Dennison MD Home: [phone]   Address:  [address] [place] [address]* : 1931 (89 yrs) Mobile: [phone]         City/State/Zip: [facility] [address] Marital: Single Language: Ezekiel Outhouse: Zander SSN4: [id number] Jewish: No Jewish Given-No Ernestina*     Race: White Ethnicity: Non  Or  O*   EMERGENCY CONTACT   Name Relationship Legal Guardian? Home Phone Work Phone Mobile Phone   1.  Urszula Dunham  2. *No Contact Specified* Friend      [phone]             GUARANTOR  Guarantor: Eryn Yanez : 1931 Home Phone: [phone]   Address: [address]  Sex: Female Work Phone:    City/State/Zip: [facility], [address] [place] [address]   Rel. to Patient: Self Guarantor ID: [id number]   [address]   Employer:  Status: RETIRED     COVERAGE  PRIMARY INSURANCE   Payor: MEDICARE Plan: MEDICARE RAILROAD   Group Number:  Insurance Type: INDEMNITY   Subscriber Name: Brandt Lugo : 1931   Subscriber ID: [id number] Pt Rel to Subscriber: Self   SECONDARY INSURANCE   Payor: [address] [place] [address] Drive: [address] [place] [address]   Group Number: [id number] Insurance Type: [facility] Name: Brandt Lugo : 1931   Subscriber ID: [id number] Pt Rel to Subscriber: SELF   TERTIARY INSURANCE   Payor:  Plan:    Group Number:  Insurance Type:    Subscriber Name:  Subscriber :    Subscriber ID:  Pt Rel to Subscriber:    Hospital Account Financial Class: Medicare    May 7, 2022

## (undated) NOTE — LETTER
William Sterling [address]  [facility] [place], [place]  [address]  INFORMED CONSENT FOR TRANSFUSION OF BLOOD OR BLOOD PRODUCTS  My physician has informed me of the nature, purpose, benefits and risks of transfusion for blood and blood components that ______________________________________________  (Signature of Patient)                                                            (Responsible party in case of Minor,

## (undated) NOTE — LETTER
[address] [place] [address]      Authorization for Surgical Operation and Procedure     Date:___________                                                                                                         Time:_______ risks that can occur: fever and allergic reactions, hemolytic reactions, transmission of diseases such as Hepatitis, AIDS and Cytomegalovirus (CMV) and fluid overload.   In the event that I wish to have an autologous transfusion of my own blood, or a direct determine when the applicable recovery period ends for purposes of reinstating the DNAR order.   10. Patients having a sterilization procedure: I understand that if the procedure is successful the results will be permanent and it will therefore be impossibl _______________________________________________________________ _____________________________  [facility])                                                                                         (Date)                                   (Time)

## (undated) NOTE — LETTER
[facility], [place]  Authorization for Invasive Procedures  1.  I hereby authorize Dr. Katia Amezcua MD, my physician and whomever may be designated as the doctor's assistant, to perform the following operation and/or procedure: ins occur: fever and allergic reactions, hemolytic reactions, transmission of disease such as hepatitis, AIDS, cytomegalovirus (CMV), and flluid overload.  In the event that I wish to have autologous transfusions of my own blood, or a directed donor transfusion Signature of Patient:  ________________________________________________ Date: _________Time: _________    Responsible person in case of minor or unconscious: _____________________________Relationship: ____________     Witness Signature: _______________

## (undated) NOTE — LETTER
William Sterling [address]  [facility] [place], [place]  [address]  INFORMED CONSENT FOR TRANSFUSION OF BLOOD OR BLOOD PRODUCTS  My physician has informed me of the nature, purpose, benefits and risks of transfusion for blood and blood components that ______________________________________________  (Signature of Patient)                                                            (Responsible party in case of Minor,

## (undated) NOTE — LETTER
[address], [place]  Authorization for Invasive Procedures  1.  I hereby authorize Slim AYON  , my physician and whomever may be designated as the doctor's assistant, to perform the following operation and/or procedure: that can occur: fever and allergic reactions, hemolytic reactions, transmission of disease such as hepatitis, AIDS, cytomegalovirus (CMV), and flluid overload.  In the event that I wish to have autologous transfusions of my own blood, or a directed donor tr physician.      Signature of Patient:  ________________________________________________ Date: _________Time: _________    Responsible person in case of minor or unconscious: _____________________________Relationship: ____________     Witness Signature: ____

## (undated) NOTE — LETTER
[address], [place]  Authorization for Invasive Procedures  1.  I hereby authorize Yessi AYON, my physician and whomever may be designated as the doctor's assistant, to perform the following operation and/or procedure: U that can occur: fever and allergic reactions, hemolytic reactions, transmission of disease such as hepatitis, AIDS, cytomegalovirus (CMV), and flluid overload.  In the event that I wish to have autologous transfusions of my own blood, or a directed donor tr physician.      Signature of Patient:  ________________________________________________ Date: _________Time: _________    Responsible person in case of minor or unconscious: _____________________________Relationship: ____________     Witness Signature: ____